# Patient Record
Sex: MALE | Race: WHITE | NOT HISPANIC OR LATINO | Employment: OTHER | URBAN - NONMETROPOLITAN AREA
[De-identification: names, ages, dates, MRNs, and addresses within clinical notes are randomized per-mention and may not be internally consistent; named-entity substitution may affect disease eponyms.]

---

## 2017-01-27 ENCOUNTER — OFFICE VISIT (OUTPATIENT)
Dept: INTERNAL MEDICINE | Facility: CLINIC | Age: 52
End: 2017-01-27

## 2017-01-27 VITALS
BODY MASS INDEX: 28.49 KG/M2 | DIASTOLIC BLOOD PRESSURE: 90 MMHG | HEART RATE: 86 BPM | HEIGHT: 73 IN | WEIGHT: 215 LBS | SYSTOLIC BLOOD PRESSURE: 130 MMHG | OXYGEN SATURATION: 97 %

## 2017-01-27 DIAGNOSIS — M25.511 CHRONIC PAIN OF BOTH SHOULDERS: Primary | ICD-10-CM

## 2017-01-27 DIAGNOSIS — G89.29 CHRONIC PAIN OF BOTH SHOULDERS: Primary | ICD-10-CM

## 2017-01-27 DIAGNOSIS — I10 ESSENTIAL HYPERTENSION: ICD-10-CM

## 2017-01-27 DIAGNOSIS — G89.29 CHRONIC PAIN OF RIGHT KNEE: ICD-10-CM

## 2017-01-27 DIAGNOSIS — M25.512 CHRONIC PAIN OF BOTH SHOULDERS: Primary | ICD-10-CM

## 2017-01-27 DIAGNOSIS — M25.561 CHRONIC PAIN OF RIGHT KNEE: ICD-10-CM

## 2017-01-27 PROCEDURE — 99213 OFFICE O/P EST LOW 20 MIN: CPT | Performed by: FAMILY MEDICINE

## 2017-01-27 RX ORDER — MELOXICAM 15 MG/1
15 TABLET ORAL DAILY
Qty: 90 TABLET | Refills: 3 | Status: SHIPPED | OUTPATIENT
Start: 2017-01-27 | End: 2019-10-10

## 2017-01-27 RX ORDER — MELOXICAM 15 MG/1
15 TABLET ORAL DAILY
Qty: 90 TABLET | Refills: 3 | Status: SHIPPED | OUTPATIENT
Start: 2017-01-27 | End: 2017-01-27 | Stop reason: SDUPTHER

## 2017-01-27 RX ORDER — CYCLOBENZAPRINE HCL 10 MG
10 TABLET ORAL 3 TIMES DAILY PRN
Qty: 90 TABLET | Refills: 2 | Status: SHIPPED | OUTPATIENT
Start: 2017-01-27 | End: 2019-10-10

## 2017-01-27 NOTE — MR AVS SNAPSHOT
Vaughn Huynh   1/27/2017 10:00 AM   Office Visit    Provider:  Darien Ramsey DO   Department:  [facility] PRIMARY CARE   Dept Phone:  [phone]                Your Full Care Plan              Today's Medication Changes          These changes are accurate as of: 1/27/17 10:55 AM.  If you have any questions, ask your nurse or doctor.               New Medication(s)Ordered:     meloxicam 15 MG tablet   Commonly known as:  MOBIC   Take 1 tablet by mouth Daily.   Started by:  Darien Ramsey DO            Where to Get Your Medications      These medications were sent to [facility] - [place], [place] - [address] [place] [address] - [phone]  - [phone] [address] [address]     Phone:  [phone]     cyclobenzaprine 10 MG tablet    meloxicam 15 MG tablet                  Your Updated Medication List          This list is accurate as of: 1/27/17 10:55 AM.  Always use your most recent med list.                aspirin 81 MG tablet       cyclobenzaprine 10 MG tablet   Commonly known as:  FLEXERIL   Take 1 tablet by mouth 3 (Three) Times a Day As Needed for muscle spasms.       fluocinonide-emollient 0.05 % cream   Commonly known as:  LIDEX-E   Apply  topically 2 (Two) Times a Day.       lisinopril-hydrochlorothiazide 20-12.5 MG per tablet   Commonly known as:  PRINZIDE,ZESTORETIC   Take 1 tablet by mouth 2 (two) times a day.       meloxicam 15 MG tablet   Commonly known as:  MOBIC   Take 1 tablet by mouth Daily.       NEXIUM 24HR 20 MG capsule   Generic drug:  esomeprazole               You Were Diagnosed With        Codes Comments    Chronic pain of both shoulders    -  Primary ICD-10-CM: M25.512, G89.29, M25.511  ICD-9-CM: 719.41, 338.29     Essential hypertension     ICD-10-CM: I10  ICD-9-CM: 401.9     Chronic pain of right knee     ICD-10-CM: M25.561, G89.29  ICD-9-CM: 719.46, 338.29       Instructions     None    Patient  "Instructions History      Vettery Signup     Our records indicate that you have an active [facility] Vettery account.    You can view your After Visit Summary by going to [email or web address] and logging in with your Vettery username and password.  If you don't have a Vettery username and password but a parent or guardian has access to your record, the parent or guardian should login with their own Vettery username and password and access your record to view the After Visit Summary.    If you have questions, you can email [email or web address] or call [phone] to talk to our Vettery staff.  Remember, Vettery is NOT to be used for urgent needs.  For medical emergencies, dial 911.               Other Info from Your Visit           Allergies     Statins  Other (See Comments)    Other reaction(s): Rhabdomyolysis      Reason for Visit     Hypertension FASTING TODAY       Vital Signs     Blood Pressure Pulse Height Weight Oxygen Saturation Body Mass Index    130/90 86 73\" (185.4 cm) 215 lb (97.5 kg) 97% 28.37 kg/m2    Smoking Status                   Former Smoker           Problems and Diagnoses Noted     Pain in both shoulders    High blood pressure    Knee pain      "

## 2017-03-15 ENCOUNTER — OFFICE VISIT (OUTPATIENT)
Dept: INTERNAL MEDICINE | Facility: CLINIC | Age: 52
End: 2017-03-15

## 2017-03-15 VITALS
SYSTOLIC BLOOD PRESSURE: 140 MMHG | HEART RATE: 89 BPM | HEIGHT: 73 IN | WEIGHT: 214.25 LBS | OXYGEN SATURATION: 95 % | TEMPERATURE: 98.6 F | BODY MASS INDEX: 28.39 KG/M2 | DIASTOLIC BLOOD PRESSURE: 85 MMHG

## 2017-03-15 DIAGNOSIS — G89.29 CHRONIC PAIN OF BOTH SHOULDERS: Primary | ICD-10-CM

## 2017-03-15 DIAGNOSIS — M25.511 CHRONIC PAIN OF BOTH SHOULDERS: Primary | ICD-10-CM

## 2017-03-15 DIAGNOSIS — E78.00 PURE HYPERCHOLESTEROLEMIA: ICD-10-CM

## 2017-03-15 DIAGNOSIS — E55.9 VITAMIN D DEFICIENCY: ICD-10-CM

## 2017-03-15 DIAGNOSIS — S83.241S ACUTE MENISCAL TEAR, MEDIAL, RIGHT, SEQUELA: ICD-10-CM

## 2017-03-15 DIAGNOSIS — Z79.899 ENCOUNTER FOR LONG-TERM CURRENT USE OF MEDICATION: ICD-10-CM

## 2017-03-15 DIAGNOSIS — R35.1 NOCTURIA: ICD-10-CM

## 2017-03-15 DIAGNOSIS — M25.512 CHRONIC PAIN OF BOTH SHOULDERS: Primary | ICD-10-CM

## 2017-03-15 DIAGNOSIS — I10 ESSENTIAL HYPERTENSION: ICD-10-CM

## 2017-03-15 DIAGNOSIS — R74.8 ABNORMAL LIVER ENZYMES: ICD-10-CM

## 2017-03-15 LAB
25(OH)D3 SERPL-MCNC: 15.4 NG/ML
ALBUMIN SERPL-MCNC: 4.5 G/DL (ref 3.2–4.8)
ALBUMIN/GLOB SERPL: 1.6 G/DL (ref 1.5–2.5)
ALP SERPL-CCNC: 87 U/L (ref 25–100)
ALT SERPL W P-5'-P-CCNC: 58 U/L (ref 7–40)
ANION GAP SERPL CALCULATED.3IONS-SCNC: 2 MMOL/L (ref 3–11)
ARTICHOKE IGE QN: 107 MG/DL (ref 0–130)
AST SERPL-CCNC: 39 U/L (ref 0–33)
BASOPHILS # BLD AUTO: 0.04 10*3/MM3 (ref 0–0.2)
BASOPHILS NFR BLD AUTO: 0.4 % (ref 0–1)
BILIRUB BLD-MCNC: NEGATIVE MG/DL
BILIRUB SERPL-MCNC: 0.7 MG/DL (ref 0.3–1.2)
BUN BLD-MCNC: 15 MG/DL (ref 9–23)
BUN/CREAT SERPL: 18.8 (ref 7–25)
CALCIUM SPEC-SCNC: 10 MG/DL (ref 8.7–10.4)
CHLORIDE SERPL-SCNC: 102 MMOL/L (ref 99–109)
CHOLEST SERPL-MCNC: 238 MG/DL (ref 0–200)
CLARITY, POC: ABNORMAL
CO2 SERPL-SCNC: 37 MMOL/L (ref 20–31)
COLOR UR: ABNORMAL
CREAT BLD-MCNC: 0.8 MG/DL (ref 0.6–1.3)
DEPRECATED RDW RBC AUTO: 42.9 FL (ref 37–54)
EOSINOPHIL # BLD AUTO: 0.21 10*3/MM3 (ref 0.1–0.3)
EOSINOPHIL NFR BLD AUTO: 2.3 % (ref 0–3)
ERYTHROCYTE [DISTWIDTH] IN BLOOD BY AUTOMATED COUNT: 13.5 % (ref 11.3–14.5)
GFR SERPL CREATININE-BSD FRML MDRD: 102 ML/MIN/1.73
GLOBULIN UR ELPH-MCNC: 2.9 GM/DL
GLUCOSE BLD-MCNC: 101 MG/DL (ref 70–100)
GLUCOSE UR STRIP-MCNC: NEGATIVE MG/DL
HCT VFR BLD AUTO: 45.6 % (ref 38.9–50.9)
HDLC SERPL-MCNC: 39 MG/DL (ref 40–60)
HGB BLD-MCNC: 15.2 G/DL (ref 13.1–17.5)
IMM GRANULOCYTES # BLD: 0.04 10*3/MM3 (ref 0–0.03)
IMM GRANULOCYTES NFR BLD: 0.4 % (ref 0–0.6)
KETONES UR QL: NEGATIVE
LEUKOCYTE EST, POC: NEGATIVE
LYMPHOCYTES # BLD AUTO: 1.36 10*3/MM3 (ref 0.6–4.8)
LYMPHOCYTES NFR BLD AUTO: 15 % (ref 24–44)
MCH RBC QN AUTO: 29 PG (ref 27–31)
MCHC RBC AUTO-ENTMCNC: 33.3 G/DL (ref 32–36)
MCV RBC AUTO: 86.9 FL (ref 80–99)
MONOCYTES # BLD AUTO: 0.85 10*3/MM3 (ref 0–1)
MONOCYTES NFR BLD AUTO: 9.4 % (ref 0–12)
NEUTROPHILS # BLD AUTO: 6.56 10*3/MM3 (ref 1.5–8.3)
NEUTROPHILS NFR BLD AUTO: 72.5 % (ref 41–71)
NITRITE UR-MCNC: NEGATIVE MG/ML
PH UR: 6 [PH] (ref 5–8)
PLATELET # BLD AUTO: 289 10*3/MM3 (ref 150–450)
PMV BLD AUTO: 10.1 FL (ref 6–12)
POTASSIUM BLD-SCNC: 4 MMOL/L (ref 3.5–5.5)
PROT SERPL-MCNC: 7.4 G/DL (ref 5.7–8.2)
PROT UR STRIP-MCNC: ABNORMAL MG/DL
PSA SERPL-MCNC: 1 NG/ML (ref 0–4)
RBC # BLD AUTO: 5.25 10*6/MM3 (ref 4.2–5.76)
RBC # UR STRIP: ABNORMAL /UL
SODIUM BLD-SCNC: 141 MMOL/L (ref 132–146)
SP GR UR: 1.01 (ref 1–1.03)
T4 FREE SERPL-MCNC: 1.11 NG/DL (ref 0.89–1.76)
TRIGL SERPL-MCNC: 515 MG/DL (ref 0–150)
TSH SERPL DL<=0.05 MIU/L-ACNC: 0.9 MIU/ML (ref 0.35–5.35)
UROBILINOGEN UR QL: ABNORMAL
WBC NRBC COR # BLD: 9.06 10*3/MM3 (ref 3.5–10.8)

## 2017-03-15 PROCEDURE — 84439 ASSAY OF FREE THYROXINE: CPT | Performed by: FAMILY MEDICINE

## 2017-03-15 PROCEDURE — 36415 COLL VENOUS BLD VENIPUNCTURE: CPT | Performed by: FAMILY MEDICINE

## 2017-03-15 PROCEDURE — 84443 ASSAY THYROID STIM HORMONE: CPT | Performed by: FAMILY MEDICINE

## 2017-03-15 PROCEDURE — 85025 COMPLETE CBC W/AUTO DIFF WBC: CPT | Performed by: FAMILY MEDICINE

## 2017-03-15 PROCEDURE — 80053 COMPREHEN METABOLIC PANEL: CPT | Performed by: FAMILY MEDICINE

## 2017-03-15 PROCEDURE — 82306 VITAMIN D 25 HYDROXY: CPT | Performed by: FAMILY MEDICINE

## 2017-03-15 PROCEDURE — 84153 ASSAY OF PSA TOTAL: CPT | Performed by: FAMILY MEDICINE

## 2017-03-15 PROCEDURE — 99213 OFFICE O/P EST LOW 20 MIN: CPT | Performed by: FAMILY MEDICINE

## 2017-03-15 PROCEDURE — 80061 LIPID PANEL: CPT | Performed by: FAMILY MEDICINE

## 2017-03-15 PROCEDURE — 81003 URINALYSIS AUTO W/O SCOPE: CPT | Performed by: FAMILY MEDICINE
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
## 2017-07-27 DIAGNOSIS — I10 ESSENTIAL HYPERTENSION: ICD-10-CM

## 2017-07-27 RX ORDER — LISINOPRIL AND HYDROCHLOROTHIAZIDE 20; 12.5 MG/1; MG/1
TABLET ORAL
Qty: 180 TABLET | Refills: 2 | Status: SHIPPED | OUTPATIENT
Start: 2017-07-27 | End: 2020-03-05 | Stop reason: SDUPTHER

## 2017-11-07 ENCOUNTER — OFFICE VISIT (OUTPATIENT)
Dept: INTERNAL MEDICINE | Facility: CLINIC | Age: 52
End: 2017-11-07

## 2017-11-07 VITALS
WEIGHT: 213 LBS | BODY MASS INDEX: 28.23 KG/M2 | OXYGEN SATURATION: 98 % | SYSTOLIC BLOOD PRESSURE: 130 MMHG | DIASTOLIC BLOOD PRESSURE: 90 MMHG | HEIGHT: 73 IN | HEART RATE: 79 BPM

## 2017-11-07 DIAGNOSIS — M62.08 RECTUS DIASTASIS: ICD-10-CM

## 2017-11-07 DIAGNOSIS — S83.206D ACUTE MENISCAL TEAR OF RIGHT KNEE, SUBSEQUENT ENCOUNTER: Primary | ICD-10-CM

## 2017-11-07 DIAGNOSIS — L30.8 OTHER ECZEMA: ICD-10-CM

## 2017-11-07 PROBLEM — S83.206A ACUTE MENISCAL TEAR OF RIGHT KNEE: Status: ACTIVE | Noted: 2017-11-07

## 2017-11-07 PROCEDURE — 99213 OFFICE O/P EST LOW 20 MIN: CPT | Performed by: FAMILY MEDICINE

## 2017-11-07 NOTE — PROGRESS NOTES
Subjective   Vaughn Huynh is a 52 y.o. male.     History of Present Illness   VAUGHN has been seeing Dr. Thakkar for some time this year for his right knee.  He was told that Vaughn needed to have a knee replacement  Before long. They are trying to procrastinate due to age.   He needs a referral from where he just started working with [employer].    He has a sore area in his ventral epigastric region that he wants evaluated.  It's sore, and he hurt it this past week carrrying heavy object.        The following portions of the patient's history were reviewed and updated as appropriate: allergies, current medications, past social history and problem list.    Review of Systems   Constitutional: Negative for appetite change, chills, fatigue, fever and unexpected weight change.   HENT: Negative for congestion, ear pain, hearing loss, nosebleeds, postnasal drip, rhinorrhea, sore throat, tinnitus and trouble swallowing.    Eyes: Negative for photophobia, discharge and visual disturbance.   Respiratory: Negative for cough, chest tightness, shortness of breath and wheezing.    Cardiovascular: Negative for chest pain, palpitations and leg swelling.   Gastrointestinal: Positive for abdominal distention and abdominal pain. Negative for blood in stool, constipation, diarrhea, nausea and vomiting.   Endocrine: Negative for cold intolerance, heat intolerance, polydipsia, polyphagia and polyuria.   Musculoskeletal: Positive for arthralgias and myalgias. Negative for back pain, joint swelling, neck pain and neck stiffness.   Skin: Negative for color change, pallor, rash and wound.   Allergic/Immunologic: Negative for environmental allergies, food allergies and immunocompromised state.   Neurological: Negative for dizziness, tremors, seizures, weakness, numbness and headaches.   Hematological: Negative for adenopathy. Does not bruise/bleed easily.   Psychiatric/Behavioral: Negative for agitation, behavioral problems, confusion,  "hallucinations, self-injury and suicidal ideas. The patient is not nervous/anxious.        Objective   /90  Pulse 79  Ht 73\" (185.4 cm)  Wt 213 lb (96.6 kg)  SpO2 98%  BMI 28.1 kg/m2  Physical Exam   Constitutional: He is oriented to person, place, and time. He appears well-developed and well-nourished. No distress.   HENT:   Head: Normocephalic and atraumatic.   Right Ear: External ear normal.   Left Ear: External ear normal.   Nose: Nose normal.   Mouth/Throat: Oropharynx is clear and moist.   Eyes: Conjunctivae and EOM are normal. Pupils are equal, round, and reactive to light. Right eye exhibits no discharge. Left eye exhibits no discharge. No scleral icterus.   Neck: Normal range of motion. Neck supple. No JVD present. No tracheal deviation present. No thyromegaly present.   Cardiovascular: Normal rate, regular rhythm, normal heart sounds and intact distal pulses.  Exam reveals no gallop and no friction rub.    No murmur heard.  Pulmonary/Chest: Effort normal and breath sounds normal. No stridor. No respiratory distress. He has no wheezes. He has no rales. He exhibits no tenderness.   Abdominal: Soft. Bowel sounds are normal. He exhibits no distension and no mass. There is no tenderness. There is no rebound and no guarding. No hernia.   Rectus diastasis     Musculoskeletal: Normal range of motion. He exhibits no edema, tenderness or deformity.   Lymphadenopathy:     He has no cervical adenopathy.   Neurological: He is alert and oriented to person, place, and time. He has normal reflexes. He displays normal reflexes. No cranial nerve deficit. He exhibits normal muscle tone.   Skin: Skin is warm and dry. No rash noted. No erythema. No pallor.   Psychiatric: He has a normal mood and affect. His behavior is normal. Judgment normal.       Assessment/Plan   Problem List Items Addressed This Visit        Musculoskeletal and Integument    Eczema    Relevant Medications    fluocinonide-emollient (LIDEX-E) " 0.05 % cream    Acute meniscal tear of right knee - Primary    Relevant Orders    Ambulatory Referral to Orthopedic Surgery    Rectus diastasis  Work on oblique abdominal muscles

## 2017-12-04 ENCOUNTER — OFFICE VISIT (OUTPATIENT)
Dept: ORTHOPEDIC SURGERY | Facility: CLINIC | Age: 52
End: 2017-12-04

## 2017-12-04 VITALS
WEIGHT: 212.96 LBS | DIASTOLIC BLOOD PRESSURE: 88 MMHG | HEIGHT: 73 IN | HEART RATE: 80 BPM | SYSTOLIC BLOOD PRESSURE: 136 MMHG | BODY MASS INDEX: 28.22 KG/M2

## 2017-12-04 DIAGNOSIS — M17.11 OSTEOARTHRITIS OF RIGHT KNEE, UNSPECIFIED OSTEOARTHRITIS TYPE: Primary | ICD-10-CM

## 2017-12-04 PROCEDURE — 20610 DRAIN/INJ JOINT/BURSA W/O US: CPT | Performed by: ORTHOPAEDIC SURGERY

## 2017-12-04 PROCEDURE — 99214 OFFICE O/P EST MOD 30 MIN: CPT | Performed by: ORTHOPAEDIC SURGERY

## 2017-12-04 RX ORDER — LIDOCAINE HYDROCHLORIDE 10 MG/ML
4 INJECTION, SOLUTION INFILTRATION; PERINEURAL
Status: COMPLETED | OUTPATIENT
Start: 2017-12-04 | End: 2017-12-04

## 2017-12-04 RX ORDER — TRIAMCINOLONE ACETONIDE 40 MG/ML
40 INJECTION, SUSPENSION INTRA-ARTICULAR; INTRAMUSCULAR
Status: COMPLETED | OUTPATIENT
Start: 2017-12-04 | End: 2017-12-04

## 2017-12-04 RX ADMIN — LIDOCAINE HYDROCHLORIDE 4 ML: 10 INJECTION, SOLUTION INFILTRATION; PERINEURAL at 10:42

## 2017-12-04 RX ADMIN — TRIAMCINOLONE ACETONIDE 40 MG: 40 INJECTION, SUSPENSION INTRA-ARTICULAR; INTRAMUSCULAR at 10:42

## 2017-12-04 NOTE — PROGRESS NOTES
Procedure   Large Joint Arthrocentesis  Date/Time: 12/4/2017 10:42 AM  Consent given by: patient  Site marked: site marked  Timeout: Immediately prior to procedure a time out was called to verify the correct patient, procedure, equipment, support staff and site/side marked as required   Supporting Documentation  Indications: pain   Procedure Details  Location: knee - R knee  Preparation: Patient was prepped and draped in the usual sterile fashion  Needle size: 22 G  Approach: anterolateral  Medications administered: 4 mL lidocaine 1 %; 40 mg triamcinolone acetonide 40 MG/ML  Patient tolerance: patient tolerated the procedure well with no immediate complications

## 2017-12-04 NOTE — PROGRESS NOTES
[facility] Orthopaedic Surgery Clinic Note    Subjective     Chief Complaint   Patient presents with   • Right Knee - Pain        HPI    Vaughn Huynh is a 52 y.o. male. He presents today for evaluation of right knee pain.  The pain is mild to moderate in severity, present for several years, following no particular injury.  The pain is associated with swelling, popping, grinding and stiffness.  The pain is sharp in quality.  It is with sitting for long periods of time.  He has responded to injections in the past.      Patient Active Problem List   Diagnosis   • Abnormal liver enzymes   • Excessive sweating   • Ganglion   • Heartburn   • Hyperlipidemia   • Hypertension   • Acute benign hemorrhagic glomerulonephritic syndrome   • Influenza due to influenza A virus   • Knee pain   • Myopathy   • Obstructive sleep apnea syndrome   • Osteoarthritis   • Postnasal drip   • Impaired glucose tolerance   • Nerve root disorder   • Shortness of breath   • Sore throat   • Parageusia   • Urinary symptom or sign   • Vitamin D deficiency   • Hematuria   • Elevated liver function tests   • Colon cancer screening   • Bilateral shoulder pain   • Eczema   • Need for vaccination   • Encounter for long-term current use of medication   • Nocturia   • Acute meniscal tear of right knee   • Rectus diastasis     Past Medical History:   Diagnosis Date   • Adult BMI 27.0-27.9 kg/sq m    • Esophagitis    • Hyperlipidemia 7/18/2016   • Hypertension 7/18/2016   • Knee pain     right   • Myopathy 7/18/2016   • Osteoarthritis 07/18/2016    right knee      Past Surgical History:   Procedure Laterality Date   • BACK SURGERY  2012      Family History   Problem Relation Age of Onset   • Cancer Mother      kindney   • Dementia Father    • Hypertension Father    • Diabetes Father    • Seizures Other    • Cancer Other    • Diabetes Other    • Hypertension Other    • Arthritis Other    • Hypertension Other    • Colon cancer Neg Hx    • Liver cancer Neg Hx     • Liver disease Neg Hx    • Esophageal cancer Neg Hx    • Stomach cancer Neg Hx      Social History     Social History   • Marital status:      Spouse name: N/A   • Number of children: N/A   • Years of education: N/A     Occupational History   • Not on file.     Social History Main Topics   • Smoking status: Former Smoker     Packs/day: 1.00     Types: Cigarettes     Start date: 1/8/1983     Quit date: 1/8/2006   • Smokeless tobacco: Never Used      Comment: quit x 10 years   • Alcohol use Yes      Comment: VERY RARE    • Drug use: No   • Sexual activity: Defer     Other Topics Concern   • Not on file     Social History Narrative      Current Outpatient Prescriptions on File Prior to Visit   Medication Sig Dispense Refill   • aspirin 81 MG tablet Take  by mouth daily.     • atorvastatin (LIPITOR) 20 MG tablet Take  by mouth Take As Directed.     • cyclobenzaprine (FLEXERIL) 10 MG tablet Take 1 tablet by mouth 3 (Three) Times a Day As Needed for muscle spasms. 90 tablet 2   • esomeprazole (NEXIUM 24HR) 20 MG capsule Take 20 mg by mouth every other day.     • fluocinonide-emollient (LIDEX-E) 0.05 % cream Apply  topically 2 (Two) Times a Day. 60 g 11   • lisinopril-hydrochlorothiazide (PRINZIDE,ZESTORETIC) 20-12.5 MG per tablet TAKE 1 TABLET TWICE A  tablet 2   • meloxicam (MOBIC) 15 MG tablet Take 1 tablet by mouth Daily. 90 tablet 3     No current facility-administered medications on file prior to visit.       Allergies   Allergen Reactions   • Statins Other (See Comments)     Other reaction(s): Rhabdomyolysis        Review of Systems   Constitutional: Negative for activity change, appetite change, chills, diaphoresis, fatigue, fever and unexpected weight change.   HENT: Positive for hearing loss. Negative for congestion, dental problem, drooling, ear discharge, ear pain, facial swelling, mouth sores, nosebleeds, postnasal drip, rhinorrhea, sinus pressure, sneezing, sore throat, tinnitus, trouble  "swallowing and voice change.    Eyes: Negative for photophobia, pain, discharge, redness, itching and visual disturbance.   Respiratory: Negative for apnea, cough, choking, chest tightness, shortness of breath, wheezing and stridor.    Cardiovascular: Negative for chest pain, palpitations and leg swelling.   Gastrointestinal: Positive for abdominal pain. Negative for abdominal distention, anal bleeding, blood in stool, constipation, diarrhea, nausea, rectal pain and vomiting.   Endocrine: Negative for cold intolerance, heat intolerance, polydipsia, polyphagia and polyuria.   Genitourinary: Negative for decreased urine volume, difficulty urinating, dysuria, enuresis, flank pain, frequency, genital sores, hematuria and urgency.   Musculoskeletal: Negative for arthralgias, back pain, gait problem, joint swelling, myalgias, neck pain and neck stiffness.        Joint Pain    Skin: Negative for color change, pallor, rash and wound.   Allergic/Immunologic: Negative for environmental allergies, food allergies and immunocompromised state.   Neurological: Negative for dizziness, tremors, seizures, syncope, facial asymmetry, speech difficulty, weakness, light-headedness, numbness and headaches.   Hematological: Negative for adenopathy. Does not bruise/bleed easily.   Psychiatric/Behavioral: Negative for agitation, behavioral problems, confusion, decreased concentration, dysphoric mood, hallucinations, self-injury, sleep disturbance and suicidal ideas. The patient is not nervous/anxious and is not hyperactive.         Objective      Physical Exam  /88  Pulse 80  Ht 72.99\" (185.4 cm)  Wt 212 lb 15.4 oz (96.6 kg)  BMI 28.1 kg/m2    Body mass index is 28.1 kg/(m^2).    General:   Mental Status:  Alert   Appearance: Cooperative, in no acute distress   Build and Nutrition: Well-nourished and well developed male   Orientation: Alert and oriented to person, place and time   Posture: Normal   Gait: " Normal    Integument:   Right knee: No skin lesions, no rash, no ecchymosis    Lower Extremities:   Right Knee:    Tenderness:  No medial or lateral joint line tenderness    Effusion:  None    Swelling: None    Crepitus:  Positive    Range of motion:  Extension: 0°       Flexion: 120°  Instability:  No varus laxity, no valgus laxity, negative anterior drawer  Deformities:  Varus      Imaging/Studies  Imaging Results (last 24 hours)     Procedure Component Value Units Date/Time    XR Knee 4+ View Right [[id number]] Resulted:  12/04/17 1033     Updated:  12/04/17 1033    Narrative:       RIght Knee Radiographs  Indication: right knee pain  Views: Standing AP's and skiers of both knees, with lateral and sunrise   views of the right knee    Comparison: no prior studies available    Findings:   Advanced arthritic changes are seen, with bone-on-bone contact in medial   compartment, with tricompartmental osteophytes.            Assessment and Plan     Vaughn was seen today for pain.    Diagnoses and all orders for this visit:    Osteoarthritis of right knee, unspecified osteoarthritis type  -     XR Knee 4+ View Right  -     Large Joint Arthrocentesis        I reviewed my findings with patient today.  He does arthritis in the right knee, and would like to continue with conservative treatment.  Ultimately he is a candidate for knee replacement surgery in the future.  He may also be a candidate for Visco supplementation injections.  He would prefer a steroid injection today.  I will see him back in 4 months, but sooner for any problems.    Of note, he had 100% relief just a few minutes following the injection.    Return in about 4 months (around 4/4/2018).      Medical Decision Making  Management Options : prescription/IM medicine  Data/Risk: radiology tests and independent visualization of imaging, lab tests, or EMG/NCV      Sen Thakkar MD  12/04/17  10:53 AM

## 2019-10-10 ENCOUNTER — APPOINTMENT (OUTPATIENT)
Dept: CT IMAGING | Facility: HOSPITAL | Age: 54
End: 2019-10-10

## 2019-10-10 ENCOUNTER — HOSPITAL ENCOUNTER (INPATIENT)
Facility: HOSPITAL | Age: 54
LOS: 5 days | Discharge: HOME HEALTH | End: 2019-10-15
Attending: EMERGENCY MEDICINE | Admitting: INTERNAL MEDICINE

## 2019-10-10 ENCOUNTER — APPOINTMENT (OUTPATIENT)
Dept: GENERAL RADIOLOGY | Facility: HOSPITAL | Age: 54
End: 2019-10-10

## 2019-10-10 DIAGNOSIS — I63.9 CEREBROVASCULAR ACCIDENT (CVA), UNSPECIFIED MECHANISM (HCC): Primary | ICD-10-CM

## 2019-10-10 PROBLEM — R09.89 SUSPECTED CEREBROVASCULAR ACCIDENT (CVA): Status: ACTIVE | Noted: 2019-10-10

## 2019-10-10 LAB
ALT SERPL W P-5'-P-CCNC: 33 U/L (ref 1–41)
APTT PPP: 27.5 SECONDS (ref 24–37)
AST SERPL-CCNC: 21 U/L (ref 1–40)
BASE EXCESS BLDA CALC-SCNC: 2 MMOL/L (ref -5–5)
BASOPHILS # BLD AUTO: 0.07 10*3/MM3 (ref 0–0.2)
BASOPHILS NFR BLD AUTO: 0.9 % (ref 0–1.5)
CA-I BLDA-SCNC: 1.23 MMOL/L (ref 1.2–1.32)
CO2 BLDA-SCNC: 28 MMOL/L (ref 24–29)
DEPRECATED RDW RBC AUTO: 39.5 FL (ref 37–54)
EOSINOPHIL # BLD AUTO: 0.18 10*3/MM3 (ref 0–0.4)
EOSINOPHIL NFR BLD AUTO: 2.4 % (ref 0.3–6.2)
ERYTHROCYTE [DISTWIDTH] IN BLOOD BY AUTOMATED COUNT: 12.6 % (ref 12.3–15.4)
GLUCOSE BLDC GLUCOMTR-MCNC: 136 MG/DL (ref 70–130)
HCO3 BLDA-SCNC: 26.9 MMOL/L (ref 22–26)
HCT VFR BLD AUTO: 43.6 % (ref 37.5–51)
HCT VFR BLDA CALC: 42 % (ref 38–51)
HGB BLD-MCNC: 14.4 G/DL (ref 13–17.7)
HGB BLDA-MCNC: 14.3 G/DL (ref 12–17)
HOLD SPECIMEN: NORMAL
HOLD SPECIMEN: NORMAL
IMM GRANULOCYTES # BLD AUTO: 0.03 10*3/MM3 (ref 0–0.05)
IMM GRANULOCYTES NFR BLD AUTO: 0.4 % (ref 0–0.5)
LYMPHOCYTES # BLD AUTO: 2 10*3/MM3 (ref 0.7–3.1)
LYMPHOCYTES NFR BLD AUTO: 26.2 % (ref 19.6–45.3)
MCH RBC QN AUTO: 28.3 PG (ref 26.6–33)
MCHC RBC AUTO-ENTMCNC: 33 G/DL (ref 31.5–35.7)
MCV RBC AUTO: 85.8 FL (ref 79–97)
MONOCYTES # BLD AUTO: 0.65 10*3/MM3 (ref 0.1–0.9)
MONOCYTES NFR BLD AUTO: 8.5 % (ref 5–12)
NEUTROPHILS # BLD AUTO: 4.69 10*3/MM3 (ref 1.7–7)
NEUTROPHILS NFR BLD AUTO: 61.6 % (ref 42.7–76)
NRBC BLD AUTO-RTO: 0 /100 WBC (ref 0–0.2)
PCO2 BLDA: 46.5 MM HG (ref 35–45)
PH BLDA: 7.37 PH UNITS (ref 7.35–7.6)
PLATELET # BLD AUTO: 270 10*3/MM3 (ref 140–450)
PMV BLD AUTO: 9.8 FL (ref 6–12)
PO2 BLDA: 43 MMHG (ref 80–105)
POTASSIUM BLDA-SCNC: 3.7 MMOL/L (ref 3.5–4.9)
RBC # BLD AUTO: 5.08 10*6/MM3 (ref 4.14–5.8)
SAO2 % BLDA: 77 % (ref 95–98)
SODIUM BLDA-SCNC: 135 MMOL/L (ref 138–146)
TROPONIN T SERPL-MCNC: <0.01 NG/ML (ref 0–0.03)
WBC NRBC COR # BLD: 7.62 10*3/MM3 (ref 3.4–10.8)
WHOLE BLOOD HOLD SPECIMEN: NORMAL
WHOLE BLOOD HOLD SPECIMEN: NORMAL

## 2019-10-10 PROCEDURE — 85025 COMPLETE CBC W/AUTO DIFF WBC: CPT | Performed by: EMERGENCY MEDICINE

## 2019-10-10 PROCEDURE — 99291 CRITICAL CARE FIRST HOUR: CPT | Performed by: INTERNAL MEDICINE

## 2019-10-10 PROCEDURE — 25010000002 ALTEPLASE PER 1 MG: Performed by: EMERGENCY MEDICINE

## 2019-10-10 PROCEDURE — 85014 HEMATOCRIT: CPT

## 2019-10-10 PROCEDURE — 84460 ALANINE AMINO (ALT) (SGPT): CPT | Performed by: EMERGENCY MEDICINE

## 2019-10-10 PROCEDURE — 82330 ASSAY OF CALCIUM: CPT

## 2019-10-10 PROCEDURE — 82803 BLOOD GASES ANY COMBINATION: CPT

## 2019-10-10 PROCEDURE — 70498 CT ANGIOGRAPHY NECK: CPT

## 2019-10-10 PROCEDURE — 70450 CT HEAD/BRAIN W/O DYE: CPT

## 2019-10-10 PROCEDURE — 3E03317 INTRODUCTION OF OTHER THROMBOLYTIC INTO PERIPHERAL VEIN, PERCUTANEOUS APPROACH: ICD-10-PCS | Performed by: EMERGENCY MEDICINE

## 2019-10-10 PROCEDURE — 0 IOPAMIDOL PER 1 ML: Performed by: EMERGENCY MEDICINE

## 2019-10-10 PROCEDURE — 84450 TRANSFERASE (AST) (SGOT): CPT | Performed by: EMERGENCY MEDICINE

## 2019-10-10 PROCEDURE — 84484 ASSAY OF TROPONIN QUANT: CPT | Performed by: EMERGENCY MEDICINE

## 2019-10-10 PROCEDURE — 0042T HC CT CEREBRAL PERFUSION W/WO CONTRAST: CPT

## 2019-10-10 PROCEDURE — 84295 ASSAY OF SERUM SODIUM: CPT

## 2019-10-10 PROCEDURE — 85730 THROMBOPLASTIN TIME PARTIAL: CPT | Performed by: EMERGENCY MEDICINE

## 2019-10-10 PROCEDURE — 70496 CT ANGIOGRAPHY HEAD: CPT

## 2019-10-10 PROCEDURE — 84132 ASSAY OF SERUM POTASSIUM: CPT

## 2019-10-10 PROCEDURE — 63710000001 INSULIN LISPRO (HUMAN) PER 5 UNITS: Performed by: NURSE PRACTITIONER

## 2019-10-10 PROCEDURE — 99285 EMERGENCY DEPT VISIT HI MDM: CPT

## 2019-10-10 PROCEDURE — 71045 X-RAY EXAM CHEST 1 VIEW: CPT

## 2019-10-10 PROCEDURE — 82947 ASSAY GLUCOSE BLOOD QUANT: CPT

## 2019-10-10 PROCEDURE — 93005 ELECTROCARDIOGRAM TRACING: CPT | Performed by: EMERGENCY MEDICINE

## 2019-10-10 RX ORDER — SODIUM CHLORIDE 0.9 % (FLUSH) 0.9 %
10 SYRINGE (ML) INJECTION EVERY 12 HOURS SCHEDULED
Status: DISCONTINUED | OUTPATIENT
Start: 2019-10-10 | End: 2019-10-11

## 2019-10-10 RX ORDER — DEXTROSE MONOHYDRATE 25 G/50ML
25 INJECTION, SOLUTION INTRAVENOUS
Status: DISCONTINUED | OUTPATIENT
Start: 2019-10-10 | End: 2019-10-12

## 2019-10-10 RX ORDER — SODIUM CHLORIDE 0.9 % (FLUSH) 0.9 %
10 SYRINGE (ML) INJECTION AS NEEDED
Status: DISCONTINUED | OUTPATIENT
Start: 2019-10-10 | End: 2019-10-15 | Stop reason: HOSPADM

## 2019-10-10 RX ORDER — SODIUM CHLORIDE 0.9 % (FLUSH) 0.9 %
10 SYRINGE (ML) INJECTION EVERY 12 HOURS SCHEDULED
Status: DISCONTINUED | OUTPATIENT
Start: 2019-10-10 | End: 2019-10-15 | Stop reason: HOSPADM

## 2019-10-10 RX ORDER — ESOMEPRAZOLE MAGNESIUM 40 MG/1
40 CAPSULE, DELAYED RELEASE ORAL
COMMUNITY
End: 2022-07-14

## 2019-10-10 RX ORDER — SODIUM CHLORIDE 0.9 % (FLUSH) 0.9 %
10 SYRINGE (ML) INJECTION AS NEEDED
Status: DISCONTINUED | OUTPATIENT
Start: 2019-10-10 | End: 2019-10-11

## 2019-10-10 RX ORDER — SODIUM CHLORIDE 0.9 % (FLUSH) 0.9 %
10 SYRINGE (ML) INJECTION AS NEEDED
Status: DISCONTINUED | OUTPATIENT
Start: 2019-10-10 | End: 2019-10-12

## 2019-10-10 RX ORDER — ASPIRIN 300 MG/1
300 SUPPOSITORY RECTAL DAILY
Status: DISCONTINUED | OUTPATIENT
Start: 2019-10-11 | End: 2019-10-12

## 2019-10-10 RX ORDER — SODIUM CHLORIDE 9 MG/ML
100 INJECTION, SOLUTION INTRAVENOUS ONCE
Status: DISCONTINUED | OUTPATIENT
Start: 2019-10-10 | End: 2019-10-12

## 2019-10-10 RX ORDER — ASPIRIN 325 MG
325 TABLET ORAL DAILY
Status: DISCONTINUED | OUTPATIENT
Start: 2019-10-11 | End: 2019-10-12

## 2019-10-10 RX ORDER — NICOTINE POLACRILEX 4 MG
15 LOZENGE BUCCAL
Status: DISCONTINUED | OUTPATIENT
Start: 2019-10-10 | End: 2019-10-12

## 2019-10-10 RX ADMIN — ALTEPLASE 79.87 MG: KIT at 22:28

## 2019-10-10 RX ADMIN — ALTEPLASE 8.87 MG: KIT at 22:27

## 2019-10-10 RX ADMIN — IOPAMIDOL 115 ML: 755 INJECTION, SOLUTION INTRAVENOUS at 22:04

## 2019-10-11 ENCOUNTER — APPOINTMENT (OUTPATIENT)
Dept: CARDIOLOGY | Facility: HOSPITAL | Age: 54
End: 2019-10-11

## 2019-10-11 ENCOUNTER — APPOINTMENT (OUTPATIENT)
Dept: MRI IMAGING | Facility: HOSPITAL | Age: 54
End: 2019-10-11

## 2019-10-11 ENCOUNTER — APPOINTMENT (OUTPATIENT)
Dept: CT IMAGING | Facility: HOSPITAL | Age: 54
End: 2019-10-11

## 2019-10-11 PROBLEM — Q21.12 PFO (PATENT FORAMEN OVALE): Status: ACTIVE | Noted: 2019-10-11

## 2019-10-11 PROBLEM — I63.9 CEREBROVASCULAR ACCIDENT (CVA): Status: ACTIVE | Noted: 2019-10-11

## 2019-10-11 LAB
ALBUMIN SERPL-MCNC: 3.9 G/DL (ref 3.5–5.2)
ALBUMIN/GLOB SERPL: 1.4 G/DL
ALP SERPL-CCNC: 67 U/L (ref 39–117)
ALT SERPL W P-5'-P-CCNC: 31 U/L (ref 1–41)
ANION GAP SERPL CALCULATED.3IONS-SCNC: 8 MMOL/L (ref 5–15)
ARTICHOKE IGE QN: 77 MG/DL (ref 0–100)
ASCENDING AORTA: 3.3 CM
AST SERPL-CCNC: 18 U/L (ref 1–40)
BASOPHILS # BLD AUTO: 0.05 10*3/MM3 (ref 0–0.2)
BASOPHILS NFR BLD AUTO: 0.8 % (ref 0–1.5)
BH CV ECHO MEAS - AO MAX PG (FULL): 3.4 MMHG
BH CV ECHO MEAS - AO MAX PG: 5.8 MMHG
BH CV ECHO MEAS - AO MEAN PG (FULL): 2 MMHG
BH CV ECHO MEAS - AO MEAN PG: 3 MMHG
BH CV ECHO MEAS - AO ROOT AREA (BSA CORRECTED): 1.6
BH CV ECHO MEAS - AO ROOT AREA: 9.6 CM^2
BH CV ECHO MEAS - AO ROOT DIAM: 3.5 CM
BH CV ECHO MEAS - AO V2 MAX: 120 CM/SEC
BH CV ECHO MEAS - AO V2 MEAN: 79.3 CM/SEC
BH CV ECHO MEAS - AO V2 VTI: 22.8 CM
BH CV ECHO MEAS - ASC AORTA: 3.3 CM
BH CV ECHO MEAS - AVA(I,A): 2.5 CM^2
BH CV ECHO MEAS - AVA(I,D): 2.5 CM^2
BH CV ECHO MEAS - AVA(V,A): 2.4 CM^2
BH CV ECHO MEAS - AVA(V,D): 2.4 CM^2
BH CV ECHO MEAS - BSA(HAYCOCK): 2.2 M^2
BH CV ECHO MEAS - BSA: 2.2 M^2
BH CV ECHO MEAS - BZI_BMI: 27.1 KILOGRAMS/M^2
BH CV ECHO MEAS - BZI_METRIC_HEIGHT: 188 CM
BH CV ECHO MEAS - BZI_METRIC_WEIGHT: 95.7 KG
BH CV ECHO MEAS - EDV(CUBED): 108.5 ML
BH CV ECHO MEAS - EDV(MOD-SP2): 61 ML
BH CV ECHO MEAS - EDV(MOD-SP4): 62 ML
BH CV ECHO MEAS - EDV(TEICH): 106 ML
BH CV ECHO MEAS - EF(CUBED): 76.1 %
BH CV ECHO MEAS - EF(MOD-BP): 52 %
BH CV ECHO MEAS - EF(MOD-SP2): 50.8 %
BH CV ECHO MEAS - EF(MOD-SP4): 53.2 %
BH CV ECHO MEAS - EF(TEICH): 68 %
BH CV ECHO MEAS - ESV(CUBED): 25.9 ML
BH CV ECHO MEAS - ESV(MOD-SP2): 30 ML
BH CV ECHO MEAS - ESV(MOD-SP4): 29 ML
BH CV ECHO MEAS - ESV(TEICH): 33.9 ML
BH CV ECHO MEAS - FS: 37.9 %
BH CV ECHO MEAS - IVS/LVPW: 1.1
BH CV ECHO MEAS - IVSD: 1.1 CM
BH CV ECHO MEAS - LA DIMENSION: 3.5 CM
BH CV ECHO MEAS - LA/AO: 1
BH CV ECHO MEAS - LAD MAJOR: 5.2 CM
BH CV ECHO MEAS - LAT PEAK E' VEL: 9.6 CM/SEC
BH CV ECHO MEAS - LATERAL E/E' RATIO: 6.3
BH CV ECHO MEAS - LV DIASTOLIC VOL/BSA (35-75): 27.9 ML/M^2
BH CV ECHO MEAS - LV MASS(C)D: 174.2 GRAMS
BH CV ECHO MEAS - LV MASS(C)DI: 78.4 GRAMS/M^2
BH CV ECHO MEAS - LV MAX PG: 2.4 MMHG
BH CV ECHO MEAS - LV MEAN PG: 1 MMHG
BH CV ECHO MEAS - LV SYSTOLIC VOL/BSA (12-30): 13 ML/M^2
BH CV ECHO MEAS - LV V1 MAX: 77.1 CM/SEC
BH CV ECHO MEAS - LV V1 MEAN: 50.6 CM/SEC
BH CV ECHO MEAS - LV V1 VTI: 15.1 CM
BH CV ECHO MEAS - LVIDD: 4.8 CM
BH CV ECHO MEAS - LVIDS: 3 CM
BH CV ECHO MEAS - LVLD AP2: 7.9 CM
BH CV ECHO MEAS - LVLD AP4: 7.6 CM
BH CV ECHO MEAS - LVLS AP2: 6.5 CM
BH CV ECHO MEAS - LVLS AP4: 6.6 CM
BH CV ECHO MEAS - LVOT AREA (M): 3.8 CM^2
BH CV ECHO MEAS - LVOT AREA: 3.8 CM^2
BH CV ECHO MEAS - LVOT DIAM: 2.2 CM
BH CV ECHO MEAS - LVPWD: 1 CM
BH CV ECHO MEAS - MED PEAK E' VEL: 6.4 CM/SEC
BH CV ECHO MEAS - MEDIAL E/E' RATIO: 9.5
BH CV ECHO MEAS - MV A MAX VEL: 67.9 CM/SEC
BH CV ECHO MEAS - MV DEC TIME: 0.25 SEC
BH CV ECHO MEAS - MV E MAX VEL: 60.5 CM/SEC
BH CV ECHO MEAS - MV E/A: 0.89
BH CV ECHO MEAS - MV MAX PG: 2.4 MMHG
BH CV ECHO MEAS - MV MEAN PG: 1 MMHG
BH CV ECHO MEAS - MV V2 MAX: 77.1 CM/SEC
BH CV ECHO MEAS - MV V2 MEAN: 46.9 CM/SEC
BH CV ECHO MEAS - MV V2 VTI: 17.9 CM
BH CV ECHO MEAS - MVA(VTI): 3.2 CM^2
BH CV ECHO MEAS - PA ACC SLOPE: 318 CM/SEC^2
BH CV ECHO MEAS - PA ACC TIME: 0.2 SEC
BH CV ECHO MEAS - PA MAX PG: 4.9 MMHG
BH CV ECHO MEAS - PA PR(ACCEL): -12.8 MMHG
BH CV ECHO MEAS - PA V2 MAX: 111 CM/SEC
BH CV ECHO MEAS - RAP SYSTOLE: 8 MMHG
BH CV ECHO MEAS - SI(AO): 98.7 ML/M^2
BH CV ECHO MEAS - SI(CUBED): 37.2 ML/M^2
BH CV ECHO MEAS - SI(LVOT): 25.8 ML/M^2
BH CV ECHO MEAS - SI(MOD-SP2): 13.9 ML/M^2
BH CV ECHO MEAS - SI(MOD-SP4): 14.8 ML/M^2
BH CV ECHO MEAS - SI(TEICH): 32.4 ML/M^2
BH CV ECHO MEAS - SV(AO): 219.4 ML
BH CV ECHO MEAS - SV(CUBED): 82.6 ML
BH CV ECHO MEAS - SV(LVOT): 57.4 ML
BH CV ECHO MEAS - SV(MOD-SP2): 31 ML
BH CV ECHO MEAS - SV(MOD-SP4): 33 ML
BH CV ECHO MEAS - SV(TEICH): 72.1 ML
BH CV ECHO MEAS - TAPSE (>1.6): 1.3 CM2
BH CV ECHO MEASUREMENTS AVERAGE E/E' RATIO: 7.56
BH CV VAS BP LEFT ARM: NORMAL MMHG
BH CV XLRA - RV BASE: 3.6 CM
BH CV XLRA - RV LENGTH: 7.8 CM
BH CV XLRA - RV MID: 3.4 CM
BH CV XLRA - TDI S': 9.91 CM/SEC
BH CV XLRA MEAS LEFT DIST CCA EDV: 17.5 CM/SEC
BH CV XLRA MEAS LEFT DIST CCA PSV: 60.8 CM/SEC
BH CV XLRA MEAS LEFT DIST ICA EDV: 32.3 CM/SEC
BH CV XLRA MEAS LEFT DIST ICA PSV: 71.6 CM/SEC
BH CV XLRA MEAS LEFT ICA/CCA RATIO: 0.96
BH CV XLRA MEAS LEFT MID CCA EDV: 22.3 CM/SEC
BH CV XLRA MEAS LEFT MID CCA PSV: 79 CM/SEC
BH CV XLRA MEAS LEFT MID ICA EDV: 31.9 CM/SEC
BH CV XLRA MEAS LEFT MID ICA PSV: 76 CM/SEC
BH CV XLRA MEAS LEFT PROX CCA EDV: 21.4 CM/SEC
BH CV XLRA MEAS LEFT PROX CCA PSV: 94.7 CM/SEC
BH CV XLRA MEAS LEFT PROX ECA EDV: 14.4 CM/SEC
BH CV XLRA MEAS LEFT PROX ECA PSV: 82.1 CM/SEC
BH CV XLRA MEAS LEFT PROX ICA EDV: 29.7 CM/SEC
BH CV XLRA MEAS LEFT PROX ICA PSV: 68.1 CM/SEC
BH CV XLRA MEAS LEFT PROX SCLA EDV: 19.6 CM/SEC
BH CV XLRA MEAS LEFT PROX SCLA PSV: 123 CM/SEC
BH CV XLRA MEAS LEFT VERTEBRAL A EDV: 13.8 CM/SEC
BH CV XLRA MEAS LEFT VERTEBRAL A PSV: 33.8 CM/SEC
BH CV XLRA MEAS RIGHT DIST CCA EDV: 21.6 CM/SEC
BH CV XLRA MEAS RIGHT DIST CCA PSV: 69.5 CM/SEC
BH CV XLRA MEAS RIGHT DIST ICA EDV: 35.8 CM/SEC
BH CV XLRA MEAS RIGHT DIST ICA PSV: 74.3 CM/SEC
BH CV XLRA MEAS RIGHT ICA/CCA RATIO: 0.76
BH CV XLRA MEAS RIGHT MID CCA EDV: 32.2 CM/SEC
BH CV XLRA MEAS RIGHT MID CCA PSV: 99.8 CM/SEC
BH CV XLRA MEAS RIGHT MID ICA EDV: 34.6 CM/SEC
BH CV XLRA MEAS RIGHT MID ICA PSV: 75.4 CM/SEC
BH CV XLRA MEAS RIGHT PROX CCA EDV: 29.1 CM/SEC
BH CV XLRA MEAS RIGHT PROX CCA PSV: 117 CM/SEC
BH CV XLRA MEAS RIGHT PROX ECA EDV: 15.7 CM/SEC
BH CV XLRA MEAS RIGHT PROX ECA PSV: 84.1 CM/SEC
BH CV XLRA MEAS RIGHT PROX ICA EDV: 24.8 CM/SEC
BH CV XLRA MEAS RIGHT PROX ICA PSV: 66.4 CM/SEC
BH CV XLRA MEAS RIGHT PROX SCLA EDV: 19.6 CM/SEC
BH CV XLRA MEAS RIGHT PROX SCLA PSV: 112 CM/SEC
BH CV XLRA MEAS RIGHT VERTEBRAL A EDV: 13 CM/SEC
BH CV XLRA MEAS RIGHT VERTEBRAL A PSV: 49.9 CM/SEC
BILIRUB SERPL-MCNC: 0.3 MG/DL (ref 0.2–1.2)
BUN BLD-MCNC: 16 MG/DL (ref 6–20)
BUN/CREAT SERPL: 24.6 (ref 7–25)
CA-I SERPL ISE-MCNC: 1.23 MMOL/L (ref 1.12–1.32)
CALCIUM SPEC-SCNC: 8.7 MG/DL (ref 8.6–10.5)
CHLORIDE SERPL-SCNC: 100 MMOL/L (ref 98–107)
CHOLEST SERPL-MCNC: 239 MG/DL (ref 0–200)
CO2 SERPL-SCNC: 27 MMOL/L (ref 22–29)
CREAT BLD-MCNC: 0.65 MG/DL (ref 0.76–1.27)
DEPRECATED RDW RBC AUTO: 39.7 FL (ref 37–54)
EOSINOPHIL # BLD AUTO: 0.21 10*3/MM3 (ref 0–0.4)
EOSINOPHIL NFR BLD AUTO: 3.2 % (ref 0.3–6.2)
ERYTHROCYTE [DISTWIDTH] IN BLOOD BY AUTOMATED COUNT: 12.7 % (ref 12.3–15.4)
GFR SERPL CREATININE-BSD FRML MDRD: 129 ML/MIN/1.73
GLOBULIN UR ELPH-MCNC: 2.8 GM/DL
GLUCOSE BLD-MCNC: 148 MG/DL (ref 65–99)
GLUCOSE BLDC GLUCOMTR-MCNC: 125 MG/DL (ref 70–130)
GLUCOSE BLDC GLUCOMTR-MCNC: 153 MG/DL (ref 70–130)
GLUCOSE BLDC GLUCOMTR-MCNC: 154 MG/DL (ref 70–130)
GLUCOSE BLDC GLUCOMTR-MCNC: 191 MG/DL (ref 70–130)
HBA1C MFR BLD: 7.4 % (ref 4.8–5.6)
HCT VFR BLD AUTO: 41.2 % (ref 37.5–51)
HDLC SERPL-MCNC: 25 MG/DL (ref 40–60)
HGB BLD-MCNC: 13.7 G/DL (ref 13–17.7)
IMM GRANULOCYTES # BLD AUTO: 0.05 10*3/MM3 (ref 0–0.05)
IMM GRANULOCYTES NFR BLD AUTO: 0.8 % (ref 0–0.5)
INR PPP: 0.99 (ref 0.85–1.16)
LDLC SERPL CALC-MCNC: ABNORMAL MG/DL
LDLC/HDLC SERPL: ABNORMAL {RATIO}
LEFT ATRIUM VOLUME INDEX: 13.9 ML/M^2
LEFT ATRIUM VOLUME: 31 ML
LYMPHOCYTES # BLD AUTO: 1.68 10*3/MM3 (ref 0.7–3.1)
LYMPHOCYTES NFR BLD AUTO: 25.4 % (ref 19.6–45.3)
MAGNESIUM SERPL-MCNC: 1.7 MG/DL (ref 1.6–2.6)
MCH RBC QN AUTO: 28.4 PG (ref 26.6–33)
MCHC RBC AUTO-ENTMCNC: 33.3 G/DL (ref 31.5–35.7)
MCV RBC AUTO: 85.5 FL (ref 79–97)
MONOCYTES # BLD AUTO: 0.51 10*3/MM3 (ref 0.1–0.9)
MONOCYTES NFR BLD AUTO: 7.7 % (ref 5–12)
NEUTROPHILS # BLD AUTO: 4.11 10*3/MM3 (ref 1.7–7)
NEUTROPHILS NFR BLD AUTO: 62.1 % (ref 42.7–76)
NRBC BLD AUTO-RTO: 0 /100 WBC (ref 0–0.2)
PHOSPHATE SERPL-MCNC: 3.9 MG/DL (ref 2.5–4.5)
PLATELET # BLD AUTO: 247 10*3/MM3 (ref 140–450)
PMV BLD AUTO: 9.6 FL (ref 6–12)
POTASSIUM BLD-SCNC: 3.9 MMOL/L (ref 3.5–5.2)
PROT SERPL-MCNC: 6.7 G/DL (ref 6–8.5)
PROTHROMBIN TIME: 12.6 SECONDS (ref 11.2–14.3)
RBC # BLD AUTO: 4.82 10*6/MM3 (ref 4.14–5.8)
RIGHT ARM BP: NORMAL MMHG
SODIUM BLD-SCNC: 135 MMOL/L (ref 136–145)
TRIGL SERPL-MCNC: 824 MG/DL (ref 0–150)
VLDLC SERPL-MCNC: ABNORMAL MG/DL
WBC NRBC COR # BLD: 6.61 10*3/MM3 (ref 3.4–10.8)

## 2019-10-11 PROCEDURE — 83735 ASSAY OF MAGNESIUM: CPT | Performed by: NURSE PRACTITIONER

## 2019-10-11 PROCEDURE — 97162 PT EVAL MOD COMPLEX 30 MIN: CPT

## 2019-10-11 PROCEDURE — 82330 ASSAY OF CALCIUM: CPT | Performed by: NURSE PRACTITIONER

## 2019-10-11 PROCEDURE — 83721 ASSAY OF BLOOD LIPOPROTEIN: CPT | Performed by: NURSE PRACTITIONER

## 2019-10-11 PROCEDURE — 97165 OT EVAL LOW COMPLEX 30 MIN: CPT

## 2019-10-11 PROCEDURE — 80061 LIPID PANEL: CPT | Performed by: NURSE PRACTITIONER

## 2019-10-11 PROCEDURE — 99232 SBSQ HOSP IP/OBS MODERATE 35: CPT | Performed by: INTERNAL MEDICINE

## 2019-10-11 PROCEDURE — 92610 EVALUATE SWALLOWING FUNCTION: CPT

## 2019-10-11 PROCEDURE — 93306 TTE W/DOPPLER COMPLETE: CPT | Performed by: INTERNAL MEDICINE

## 2019-10-11 PROCEDURE — 92523 SPEECH SOUND LANG COMPREHEN: CPT

## 2019-10-11 PROCEDURE — 85610 PROTHROMBIN TIME: CPT | Performed by: NURSE PRACTITIONER

## 2019-10-11 PROCEDURE — 99255 IP/OBS CONSLTJ NEW/EST HI 80: CPT | Performed by: PSYCHIATRY & NEUROLOGY

## 2019-10-11 PROCEDURE — 82962 GLUCOSE BLOOD TEST: CPT

## 2019-10-11 PROCEDURE — 0 GADOBENATE DIMEGLUMINE 529 MG/ML SOLUTION: Performed by: INTERNAL MEDICINE

## 2019-10-11 PROCEDURE — 80053 COMPREHEN METABOLIC PANEL: CPT | Performed by: NURSE PRACTITIONER

## 2019-10-11 PROCEDURE — 84100 ASSAY OF PHOSPHORUS: CPT | Performed by: NURSE PRACTITIONER

## 2019-10-11 PROCEDURE — 70553 MRI BRAIN STEM W/O & W/DYE: CPT

## 2019-10-11 PROCEDURE — 85025 COMPLETE CBC W/AUTO DIFF WBC: CPT | Performed by: NURSE PRACTITIONER

## 2019-10-11 PROCEDURE — 93880 EXTRACRANIAL BILAT STUDY: CPT

## 2019-10-11 PROCEDURE — 93880 EXTRACRANIAL BILAT STUDY: CPT | Performed by: INTERNAL MEDICINE

## 2019-10-11 PROCEDURE — A9577 INJ MULTIHANCE: HCPCS | Performed by: INTERNAL MEDICINE

## 2019-10-11 PROCEDURE — 93306 TTE W/DOPPLER COMPLETE: CPT

## 2019-10-11 PROCEDURE — 83036 HEMOGLOBIN GLYCOSYLATED A1C: CPT | Performed by: NURSE PRACTITIONER

## 2019-10-11 PROCEDURE — 70450 CT HEAD/BRAIN W/O DYE: CPT

## 2019-10-11 RX ADMIN — INSULIN LISPRO 2 UNITS: 100 INJECTION, SOLUTION INTRAVENOUS; SUBCUTANEOUS at 08:59

## 2019-10-11 RX ADMIN — GADOBENATE DIMEGLUMINE 15 ML: 529 INJECTION, SOLUTION INTRAVENOUS at 16:30

## 2019-10-11 RX ADMIN — SODIUM CHLORIDE, PRESERVATIVE FREE 10 ML: 5 INJECTION INTRAVENOUS at 09:00

## 2019-10-11 NOTE — THERAPY DISCHARGE NOTE
Acute Care - Occupational Therapy Initial Eval/Discharge  [facility]     Patient Name: Vaughn Huynh  : 1965  MRN: [id number]  Today's Date: 10/11/2019  Onset of Illness/Injury or Date of Surgery: 10/10/19  Date of Referral to OT: 10/10/19         Admit Date: 10/10/2019       ICD-10-CM ICD-9-CM   1. Cerebrovascular accident (CVA), unspecified mechanism (CMS/HCC) I63.9 434.91     Patient Active Problem List   Diagnosis   • Abnormal liver enzymes   • Excessive sweating   • Ganglion   • Heartburn   • Hyperlipidemia   • Hypertension   • Acute benign hemorrhagic glomerulonephritic syndrome   • Influenza due to influenza A virus   • Knee pain   • Myopathy   • Obstructive sleep apnea syndrome   • Osteoarthritis   • Postnasal drip   • Impaired glucose tolerance   • Nerve root disorder   • Shortness of breath   • Sore throat   • Parageusia   • Urinary symptom or sign   • Vitamin D deficiency   • Hematuria   • Elevated liver function tests   • Colon cancer screening   • Bilateral shoulder pain   • Eczema   • Need for vaccination   • Encounter for long-term current use of medication   • Nocturia   • Acute meniscal tear of right knee   • Rectus diastasis   • Suspected cerebrovascular accident (CVA) s/p TPA   • Cerebrovascular accident (CVA) (CMS/HCC)     Past Medical History:   Diagnosis Date   • Adult BMI 27.0-27.9 kg/sq m    • Cerebrovascular accident (CVA) (CMS/HCC) 10/11/2019   • Elevated cholesterol    • Esophagitis    • Heartburn    • Hyperlipidemia 2016   • Hypertension 2016   • Knee pain     right   • Myopathy 2016   • Osteoarthritis 2016    right knee     Past Surgical History:   Procedure Laterality Date   • BACK SURGERY            OT ASSESSMENT FLOWSHEET (last 12 hours)      Occupational Therapy Evaluation     Row Name 10/11/19 1047                   OT Evaluation Time/Intention    Subjective Information  no complaints  -CL        Document Type  discharge evaluation/summary  -CL         Mode of Treatment  occupational therapy  -CL        Patient Effort  excellent  -CL        Symptoms Noted During/After Treatment  none  -CL           General Information    Patient Profile Reviewed?  yes  -CL        Onset of Illness/Injury or Date of Surgery  10/10/19  -CL        Prior Level of Function  independent:;all household mobility;transfer;ADL's;dressing;bathing;driving  -CL        Equipment Currently Used at Home  none  -CL        Existing Precautions/Restrictions  fall  -CL        Risks Reviewed  patient:;LOB;nausea/vomiting;dizziness;increased discomfort;change in vital signs;lines disloged  -CL        Benefits Reviewed  patient:;improve function;increase independence;increase strength;increase balance;decrease pain;increase knowledge  -CL        Barriers to Rehab  none identified  -CL           Relationship/Environment    Lives With  significant other  -CL           Resource/Environmental Concerns    Current Living Arrangements  home/apartment/condo  -CL           Cognitive Assessment/Interventions    Additional Documentation  Cognitive Assessment/Intervention (Group)  -CL           Cognitive Assessment/Intervention- PT/OT    Affect/Mental Status (Cognitive)  WFL  -CL        Orientation Status (Cognition)  oriented x 4  -CL        Follows Commands (Cognition)  WFL  -CL        Cognitive Function (Cognitive)  WFL  -CL           Bed Mobility Assessment/Treatment    Bed Mobility Assessment/Treatment  supine-sit  -CL        Supine-Sit [place] (Bed Mobility)  conditional independence  -CL        Assistive Device (Bed Mobility)  head of bed elevated  -CL           Functional Mobility    Functional Mobility- Ind. Level  standby assist  -CL        Functional Mobility-Distance (Feet)  20  -CL        Functional Mobility- Comment  Pt ambulated to/from the bathroom  -CL           Transfer Assessment/Treatment    Transfer Assessment/Treatment  sit-stand transfer;stand-sit transfer  -CL           Sit-Stand  Transfer    Sit-Stand [place] (Transfers)  independent  -CL           Stand-Sit Transfer    Stand-Sit [place] (Transfers)  independent  -CL           ADL Assessment/Intervention    BADL Assessment/Intervention  toileting;grooming  -CL           Grooming Assessment/Training    [place] Level (Grooming)  wash face, hands;independent  -CL        Grooming Position  unsupported standing;sink side  -CL           Toileting Assessment/Training    [place] Level (Toileting)  adjust/manage clothing;perform perineal hygiene;independent  -CL        Toileting Position  unsupported standing  -CL           General ROM    GENERAL ROM COMMENTS  BUE grossly WFL.   -CL           MMT (Manual Muscle Testing)    General MMT Comments  BUE grossly WFL.   -CL           Motor Assessment/Interventions    Additional Documentation  Balance (Group);Therapeutic Exercise (Group);Gross Motor Coordination (Group)  -CL           Gross Motor Coordination    Gross Motor Impairments  finger to nose  -CL        Gross Motor Skill, Impairments Detail  BUE WFL.   -CL           Balance    Balance  static sitting balance;static standing balance  -CL           Static Sitting Balance    Level of [place] (Unsupported Sitting, Static Balance)  independent  -CL        Sitting Position (Unsupported Sitting, Static Balance)  sitting on edge of bed  -CL           Static Standing Balance    Level of [place] (Supported Standing, Static Balance)  independent  -CL           Sensory Assessment/Intervention    Sensory General Assessment  no sensation deficits identified  -CL        Additional Documentation  Vision Assessment/Intervention (Group)  -CL           Vision Assessment/Intervention    Visual Impairment/Limitations  WFL  -CL           Positioning and Restraints    Pre-Treatment Position  in bed  -CL        Post Treatment Position  chair  -CL        In Chair  notified nsg;sitting;call light within reach;encouraged to call for assist;exit  alarm on  -CL           Pain Assessment    Additional Documentation  Pain Scale: Numbers Pre/Post-Treatment (Group)  -CL           Pain Scale: Numbers Pre/Post-Treatment    Pain Scale: Numbers, Pretreatment  0/10 - no pain  -CL        Pain Scale: Numbers, Post-Treatment  0/10 - no pain  -CL           Clinical Impression (OT)    Date of Referral to OT  10/10/19  -CL        OT Diagnosis  Pt at baseline ADL performance.   -CL        Patient/Family Goals Statement (OT Eval)  Return home.   -CL        Criteria for Skilled Therapeutic Interventions Met (OT Eval)  no;no problems identified which require skilled intervention  -CL        Anticipated Discharge Disposition (OT)  home with assist  -CL           Vital Signs    Pre Systolic BP Rehab  129  -CL        Pre Treatment Diastolic BP  100  -CL        Post Systolic BP Rehab  144  -CL        Post Treatment Diastolic BP  100  -CL        Pretreatment Heart Rate (beats/min)  87  -CL        Posttreatment Heart Rate (beats/min)  85  -CL        Pre SpO2 (%)  98  -CL        O2 Delivery Pre Treatment  room air  -CL        Post SpO2 (%)  98  -CL        O2 Delivery Post Treatment  room air  -CL        Pre Patient Position  Supine  -CL        Intra Patient Position  Standing  -CL        Post Patient Position  Sitting  -CL           Living Environment    Home Accessibility  -- has stairs to basement, but can avoid  -CL          User Key  (r) = Recorded By, (t) = Taken By, (c) = Cosigned By    Initials Name Effective Dates    CL Rowan Alegre, HARPREET 04/03/18 -           Occupational Therapy Education     Title: PT OT SLP Therapies (In Progress)     Topic: Occupational Therapy (In Progress)     Point: ADL training (Done)     Description: Instruct learner(s) on proper safety adaptation and remediation techniques during self care or transfers.   Instruct in proper use of assistive devices.    Learning Progress Summary           Patient RAZA Buckner VU by ANCELMO at 10/11/2019 10:47 AM                    Point: Precautions (Done)     Description: Instruct learner(s) on prescribed precautions during self-care and functional transfers.    Learning Progress Summary           Patient Acceptance, E, VU by  at 10/11/2019 10:47 AM                   Point: Body mechanics (Done)     Description: Instruct learner(s) on proper positioning and spine alignment during self-care, functional mobility activities and/or exercises.    Learning Progress Summary           Patient Acceptance, E, VU by  at 10/11/2019 10:47 AM                               User Key     Initials Effective Dates Name Provider Type Discipline     04/03/18 -  Rowan Alegre, OT Occupational Therapist OT                OT Recommendation and Plan  Outcome Summary/Treatment Plan (OT)  Anticipated Discharge Disposition (OT): home with assist  Plan of Care Review  Plan of Care Reviewed With: patient  Plan of Care Reviewed With: patient  Outcome Summary: OT completed a brief chart review. Pt is independent w/ ADLs and mobility. Pt has no further deficits which require skilled IPOT intervention, will sign off. Recommend pt DC home w/ assist.          Outcome Measures     Row Name 10/11/19 1047             How much help from another is currently needed...    Putting on and taking off regular lower body clothing?  4  -CL      Bathing (including washing, rinsing, and drying)  4  -CL      Toileting (which includes using toilet bed pan or urinal)  4  -CL      Putting on and taking off regular upper body clothing  4  -CL      Taking care of personal grooming (such as brushing teeth)  4  -CL      Eating meals  4  -CL      AM-PAC 6 Clicks Score (OT)  24  -CL         Modified Essence Scale    Pre-Stroke Modified [place] Scale  0 - No Symptoms at all.  -CL      Modified [place] Scale  0 - No Symptoms at all.  -CL         Functional Assessment    Outcome Measure Options  AM-PAC 6 Clicks Daily Activity (OT);Modified [place]  -CL        User Key  (r) = Recorded By, (t) =  Taken By, (c) = Cosigned By    Initials Name Provider Type    CL Rowan Alegre OT Occupational Therapist          Time Calculation:   Time Calculation- OT     Row Name 10/11/19 1047             Time Calculation- OT    OT Start Time  1047  -CL      OT Received On  10/11/19  -CL      OT Goal Re-Cert Due Date  10/21/19  -CL        User Key  (r) = Recorded By, (t) = Taken By, (c) = Cosigned By    Initials Name Provider Type    Rowan Souza OT Occupational Therapist        Therapy Suggested Charges     Code   Minutes Charges    None           Therapy Charges for Today     Code Description Service Date Service Provider Modifiers Qty    [id number]  OT EVAL LOW COMPLEXITY 4 10/11/2019 Rowan Alegre OT GO 1               OT Discharge Summary  Anticipated Discharge Disposition (OT): home with assist  Reason for Discharge: At baseline function  Outcomes Achieved: Refer to plan of care for updates on goals achieved  Discharge Destination: Home with assist    Rowan Alegre OT  10/11/2019

## 2019-10-11 NOTE — PLAN OF CARE
Problem: Patient Care Overview  Goal: Plan of Care Review  Outcome: Ongoing (interventions implemented as appropriate)   10/11/19 0436   Coping/Psychosocial   Plan of Care Reviewed With patient;significant other   Plan of Care Review   Progress improving   OTHER   Outcome Summary Patient arrived to floor, NIH 6, left facial droop, expressive aphasia, left side weakness, patient does have some inattention to the left side with bilateral stimulation, VSS, will continue to monitor       Problem: Stroke (Ischemic) (Adult)  Goal: Signs and Symptoms of Listed Potential Problems Will be Absent, Minimized or Managed (Stroke)  Outcome: Ongoing (interventions implemented as appropriate)   10/11/19 0436   Goal/Outcome Evaluation   Problems Assessed (Stroke (Ischemic)) all   Problems Assessed (Stroke (Ischemic)) acute neurologic deterioration;communication impairment;eating/swallowing impairment;motor/sensory impairment;situational response       Problem: Thrombolytic Therapy (Adult)  Goal: Signs and Symptoms of Listed Potential Problems Will be Absent, Minimized or Managed (Thrombolytic Therapy)  Outcome: Ongoing (interventions implemented as appropriate)   10/11/19 0436   Goal/Outcome Evaluation   Problems Assessed (Thrombolytic Therapy) all   Problems Assessed (Thrombolytic Therapy) none

## 2019-10-11 NOTE — H&P
CRITICAL CARE ADMISSION NOTE    Chief Complaint     Suspected cerebrovascular accident (CVA)    History of Present Illness     Mr. Huynh is a 53-year-old male with past medical history of GERD, hyperlipidemia, hypertension, osteoarthritis of the right knee and obesity who presented today to the ER with concern for stroke.  Per EMS he began experiencing double vision around 2030 today.  Per the wife's reports he called her concerned with the double vision. They had a normal conversation and she asked him to check his blood sugar prior to him hanging up.  Shortly after he again called his wife and at that point was experiencing confused and slurred speech so EMS was called. En route to the hospital patient was experiencing left-sided weakness, left facial droop, and abnormal gait.  On arrival to the hospital NIH documented at 6.  Patient's blood pressure was 147/104, temp 99.1, heart rate 92, respiratory rate 20.  Significant labs were all negative including white count of 7.6, hemoglobin 14.4, platelets 270, troponin less than 0.010, AST 21, ALT 33.  CT stroke protocol was suspicious for small cyst acute or subacute infarct in the head of the right caudate nucleus without evidence of intracranial hemorrhage.  CT angiogram head and neck (PENDING).  CT perfusion with optimal atrial input function and perfusion maps with symmetry and without evidence of cerebral ischemia or cord infarction.  He did receive TPA in the ED.  Patient will be admitted to the ICU for further evaluation and management post TPA.    On arrival to ICU alert and oriented x4.  He does continue to have some left facial droop most notably in the left eye.  NIH on arrival to ICU is 6, TPA is not almost complete.    Problem List, Surgical History, Family, Social History, and ROS     Past Medical History:   Diagnosis Date   • Adult BMI 27.0-27.9 kg/sq m    • Esophagitis    • Heartburn    • Hyperlipidemia 7/18/2016   • Hypertension 7/18/2016   • Knee  pain     right   • Myopathy 2016   • Osteoarthritis 2016    right knee     Past Surgical History:   Procedure Laterality Date   • BACK SURGERY         Allergies   Allergen Reactions   • Statins Other (See Comments)     Other reaction(s): Rhabdomyolysis     No current facility-administered medications on file prior to encounter.      Current Outpatient Medications on File Prior to Encounter   Medication Sig   • aspirin 81 MG tablet Take  by mouth daily.   • esomeprazole (nexIUM) 40 MG capsule Take 40 mg by mouth Every Morning Before Breakfast.   • lisinopril-hydrochlorothiazide (PRINZIDE,ZESTORETIC) 20-12.5 MG per tablet TAKE 1 TABLET TWICE A DAY   • METFORMIN HCL PO Take 1 tablet by mouth 2 (Two) Times a Day.   • [DISCONTINUED] atorvastatin (LIPITOR) 20 MG tablet Take  by mouth Take As Directed.   • [DISCONTINUED] cyclobenzaprine (FLEXERIL) 10 MG tablet Take 1 tablet by mouth 3 (Three) Times a Day As Needed for muscle spasms.   • [DISCONTINUED] esomeprazole (NEXIUM 24HR) 20 MG capsule Take 20 mg by mouth every other day.   • [DISCONTINUED] fluocinonide-emollient (LIDEX-E) 0.05 % cream Apply  topically 2 (Two) Times a Day.   • [DISCONTINUED] meloxicam (MOBIC) 15 MG tablet Take 1 tablet by mouth Daily.     MEDICATION LIST AND ALLERGIES REVIEWED.    Family History   Problem Relation Age of Onset   • Cancer Mother         kindney   • Dementia Father    • Hypertension Father    • Diabetes Father    • Seizures Other    • Cancer Other    • Diabetes Other    • Hypertension Other    • Arthritis Other    • Hypertension Other    • Colon cancer Neg Hx    • Liver cancer Neg Hx    • Liver disease Neg Hx    • Esophageal cancer Neg Hx    • Stomach cancer Neg Hx      Social History     Tobacco Use   • Smoking status: Former Smoker     Packs/day: 1.00     Types: Cigarettes     Start date: 1983     Last attempt to quit: 2006     Years since quittin.7   • Smokeless tobacco: Never Used   • Tobacco comment:  "quit x 10 years   Substance Use Topics   • Alcohol use: Yes     Comment: VERY RARE    • Drug use: No     Social History     Social History Narrative   • Not on file     FAMILY AND SOCIAL HISTORY REVIEWED.    Review of Systems   Neurological: Positive for speech difficulty and weakness.   Psychiatric/Behavioral: Positive for confusion.     AS ABOVE OR ALL OTHER SYSTEMS REVIEWED AND ARE NEGATIVE.    Physical Exam and Clinical Information   /89   Pulse 94   Temp 99.1 °F (37.3 °C) (Oral)   Resp 20   Ht 182.9 cm (72\")   Wt 98.6 kg (217 lb 6 oz)   SpO2 95%   BMI 29.48 kg/m²   Physical Exam:     General Appearance: Awake, in no acute distress, expressive aphasia.   Head:    Atraumatic, Normocephalic, without obvious abnormality, Pupils reactive & symmetrical B/L. Left facial weakness, unable to close left eyelid.   Neck:   Supple,  trachea midline.   Lungs:   B/L Breath sounds present with decreased breath sounds on bases, no wheezing heard, no crackles.   Heart: S1 and S2 present, no murmur  Abdomen: Soft, non-tender, no guarding or rigidity, bowel sounds positive.  Extremities: Atraumatic, no cyanosis or clubbing,  no edema, warm to touch.  Pulses: Positive and symmetric.  Neurologic:  Moving all four extremities. Good strength bilaterally. Tongue with left deviation. Left central facial N weakness. Expressive aphasia.       Results from last 7 days   Lab Units 10/10/19  2201 10/10/19  2159   WBC 10*3/mm3 7.62  --    HEMOGLOBIN g/dL 14.4  --    HEMOGLOBIN, POC g/dL  --  14.3   PLATELETS 10*3/mm3 270  --            Invalid input(s): CHLORIDE  CrCl cannot be calculated (Patient's most recent lab result is older than the maximum 30 days allowed.).      Results from last 7 days   Lab Units 10/10/19  2159   PH, ARTERIAL pH units 7.37     No results found for: LACTATE     IMAGES:     CT brain and CTA reports reviewed. Normal perfusion.     CXR reviewed personally and no acute pathology noted.       I reviewed the " patient's results and images.     Assesment     Active Hospital Problems    Diagnosis   • **Suspected cerebrovascular accident (CVA) s/p TPA   • Obstructive sleep apnea syndrome   • Hypertension   • Hyperlipidemia     Plan/Recommendations     1. Admit to ICU  2. Post TPA protocol including 24-hour post TPA CT head ordered  3. A.m. labs pending  4. Aspirin ordered  5. Per documentation patient is allergic to statins with reaction reportedly rhabdomyolysis will hold off on adding statin at this time  6. Cardene drip ordered for systolic blood pressure less than or equal to 180 and diastolic blood pressure less than or equal to 105  7. Patient will remain n.p.o. until swallow study performed  8. Echo and bilateral lower carotid duplexes ordered for tomorrow  9. Sliding scale lispro ordered for correction  10. Supportive care      Irina Mcadams, MSN, AGACNP-BC, APRN    ADDENDUM:  I have seen and examined patient, performing a face-to-face diagnostic evaluation with plan of care reviewed and developed with APRN and nursing staff. I have addended and modified the above history of present illness, conducted physical examination and documented as above, and assessment and plan to reflect my findings and impressions.     Patient presenting with acute onset of left-sided weakness along with slurring of speech but acutely around 8 PM.  Now status post TPA.  Cerebral perfusion study was normal.  Neurology has been consulted.  Wife thinks patient strength is improving on the left side but still with significant left facial nerve involvement as well as expressive aphasia.  tPA is just about to finish.  We will monitor him and monitor post TPA complications.  Routine orders written.  Will get echocardiogram to rule out embolic disease.  Check carotid duplex as well.  No carotid dissection noted on the CTA.    Physical therapy, Occupational Therapy and speech to evaluate the patient in the morning.    GI prophylaxis added.  Monitor  blood glucose and insulin as needed for hyperglycemia management.    Patient does have history of sleep apnea not using CPAP currently.  Discussed with wife that sleep apnea is a significant risk factor for stroke along with diabetes as well.  He will benefit from sleep clinic follow-up post discharge for management of his sleep apnea and to prevent recurrence.  He verbalized understanding.      Remote smoking and no current alcohol abuse history.      Level of Risk High due to:  illness with threat to life or bodily function    Critical Care time spent in direct patient care: 30 minutes (excluding procedure time, if applicable) including high complexity decision making to assess, manipulate, and support vital organ system failure in this individual who has impairment of one or more vital organ systems such that there is a high probability of imminent or life threatening deterioration in the patient’s condition.      CC: Sen Nichole MD

## 2019-10-11 NOTE — PROGRESS NOTES
"Critical Care Note     LOS: 1 day   Patient Care Team:  Sen Nichole MD as PCP - General (Internal Medicine)    Chief Complaint/Reason for visit:    Chief Complaint   Patient presents with   • Stroke       Subjective     53-year-old gentleman with high blood pressure, dyslipidemia presenting with double vision, slurred speech and confusion with left-sided weakness.  Initial NIH stroke scale was a 6.  He received TPA.  CT stroke protocol revealed a possible subacute infarct in the right caudate nucleus.    Interval History:     This morning his NIH stroke scale is a 2 for mild sensory deficit left side and facial droop  Physical therapy noted a mild balance deficit.  He ambulated 400 feet.  OT noted independence with ADLs and mobility and signed off  Speech noted that he had no difficulty swallowing and recommended a regular diet with thin liquids.      History taken from: patient    Review of Systems:    All systems were reviewed and negative except as noted in subjective.    Medical history, surgical history, social history, family history reviewed    Objective     Intake/Output:    Intake/Output Summary (Last 24 hours) at 10/11/2019 1532  Last data filed at 10/11/2019 1200  Gross per 24 hour   Intake 580 ml   Output --   Net 580 ml       Nutrition:  Diet Regular; Cardiac, Consistent Carbohydrate, Low Fat    Infusions:    niCARdipine 5-15 mg/hr         Telemetry: Sinus rhythm             Vital Signs  Blood pressure 122/84, pulse 76, temperature 98.8 °F (37.1 °C), temperature source Oral, resp. rate 20, height 188 cm (74\"), weight 95.7 kg (211 lb), SpO2 95 %.    Physical Exam:  General Appearance:   Well-developed middle-aged gentleman in no distress   Head:   Atraumatic   Eyes:          No jaundice   Ears:     Throat:  Oral mucosa moist   Neck:  No carotid bruit.  Trachea midline   Back:      Lungs:    Symmetric chest expansion without wheeze or rhonchi    Heart:   Regular rhythm, S1, S2 auscultated   Abdomen: "    Bowel sounds present, nondistended, soft   Rectal:   Deferred   Extremities:  No pitting edema or cyanosis   Pulses:    Skin:  Warm and dry   Lymph nodes:    Neurologic:  Oriented x3, speech is fluent, no visual field deficit, pupils are equal and reactive to light.  Extraocular movements intact.  Mild left facial weakness.  No motor drift upper or lower extremities.  Normal gait      Results Review:     I reviewed the patient's new clinical results.   Results from last 7 days   Lab Units 10/11/19  0456 10/10/19  2201   SODIUM mmol/L 135*  --    POTASSIUM mmol/L 3.9  --    CHLORIDE mmol/L 100  --    CO2 mmol/L 27.0  --    BUN mg/dL 16  --    CREATININE mg/dL 0.65*  --    CALCIUM mg/dL 8.7  --    BILIRUBIN mg/dL 0.3  --    ALK PHOS U/L 67  --    ALT (SGPT) U/L 31 33   AST (SGOT) U/L 18 21   GLUCOSE mg/dL 148*  --      Results from last 7 days   Lab Units 10/11/19  0456 10/10/19  2201 10/10/19  2159   WBC 10*3/mm3 6.61 7.62  --    HEMOGLOBIN g/dL 13.7 14.4  --    HEMOGLOBIN, POC g/dL  --   --  14.3   HEMATOCRIT % 41.2 43.6  --    HEMATOCRIT POC %  --   --  42   PLATELETS 10*3/mm3 247 270  --      Results from last 7 days   Lab Units 10/10/19  2159   PH, ARTERIAL pH units 7.37     No results found for: BLOODCX  No results found for: URINECX    I reviewed the patient's new imaging including images and reports.  Interpretation Summary     · Left ventricular systolic function is normal.  · Calculated EF = 52.0%. Estimated EF appears to be in the range of 56 - 60%.  · Left ventricular diastolic dysfunction (grade I) consistent with impaired relaxation.  · Saline test results are positive for right to left atrial level shunt.         All medications reviewed.     aspirin 325 mg Oral Daily   Or      aspirin 300 mg Rectal Daily   insulin lispro 0-9 Units Subcutaneous 4x Daily With Meals & Nightly   sodium chloride 10 mL Intravenous Q12H   sodium chloride 100 mL/hr Intravenous Once         Assessment/Plan       Suspected  cerebrovascular accident (CVA) s/p TPA    Hyperlipidemia    Hypertension    Obstructive sleep apnea syndrome    Cerebrovascular accident (CVA) (CMS/HCC)    Middle-aged gentleman presenting with some left-sided symptoms and visual complaints suspicious for a stroke.  He received TPA.  NIH stroke score improved from a 6 to a 2.  Echo reveals a right to left shunt.  Carotid duplex is normal    PLAN:  Follow-up CT scan at midnight  Continue aspirin  Allergy to statin  Monitor blood pressure and initiate therapy as needed  Continue to monitor NIH stroke scale  Sliding scale insulin  Consider starting Eliquis in the morning because of positive saline test      Radha Austin MD  10/11/19  3:32 PM      Time: 20min

## 2019-10-11 NOTE — CONSULTS
Inpatient Neurology Consult Stroke  Consult performed by: Carlos Vasquez MD  Consult ordered by: Delano Miranda DO  Reason for consult: stroke sx           Patient Care Team:  Sen Nichole MD as PCP - General (Internal Medicine)    Chief complaint: stroke sx    Subjective     History of Present Illness    53 y.o. male referred by Dr Miranda for stroke sx.  Presented to [facility] ED on 10/10/19 with complaints of diplopia, confusion and slurred speech.  En route to ED developed left sided weakness.  Sx started at 2030.  Discussed with Dr Miranda and proceeded with IV tPA.     HCT/CTP/CTA, my review of films,  unremarkable    A1C 7.4; chol 239   CBC,CMP, PTT - NCS     Left sided weakness resolved this am.      Review of Systems   Constitutional: Negative for activity change, fatigue and unexpected weight change.   HENT: Negative for facial swelling, hearing loss, tinnitus, trouble swallowing and voice change.    Eyes: Positive for visual disturbance. Negative for photophobia and pain.   Respiratory: Negative for apnea, cough and choking.    Cardiovascular: Negative for chest pain.   Gastrointestinal: Negative for constipation, nausea and vomiting.   Endocrine: Negative for cold intolerance.   Genitourinary: Negative for difficulty urinating, frequency and urgency.   Musculoskeletal: Positive for gait problem. Negative for arthralgias, back pain, myalgias, neck pain and neck stiffness.   Skin: Negative for rash.   Allergic/Immunologic: Negative for immunocompromised state.   Neurological: Positive for speech difficulty and weakness. Negative for dizziness, tremors, seizures, syncope, facial asymmetry, light-headedness, numbness and headaches.   Hematological: Negative for adenopathy.   Psychiatric/Behavioral: Positive for decreased concentration. Negative for confusion, hallucinations and sleep disturbance. The patient is not nervous/anxious.         Past Medical History:   Diagnosis Date   • Adult BMI  27.0-27.9 kg/sq m    • Elevated cholesterol    • Esophagitis    • Heartburn    • Hyperlipidemia 2016   • Hypertension 2016   • Knee pain     right   • Myopathy 2016   • Osteoarthritis 2016    right knee   ,   Past Surgical History:   Procedure Laterality Date   • BACK SURGERY     ,   Family History   Problem Relation Age of Onset   • Cancer Mother         kindney   • Dementia Father    • Hypertension Father    • Diabetes Father    • Seizures Other    • Cancer Other    • Diabetes Other    • Hypertension Other    • Arthritis Other    • Hypertension Other    • Colon cancer Neg Hx    • Liver cancer Neg Hx    • Liver disease Neg Hx    • Esophageal cancer Neg Hx    • Stomach cancer Neg Hx    ,   Social History     Tobacco Use   • Smoking status: Former Smoker     Packs/day: 1.00     Types: Cigarettes     Start date: 1983     Last attempt to quit: 2006     Years since quittin.7   • Smokeless tobacco: Never Used   • Tobacco comment: quit x 10 years   Substance Use Topics   • Alcohol use: Yes     Comment: VERY RARE    • Drug use: No   ,   Medications Prior to Admission   Medication Sig Dispense Refill Last Dose   • aspirin 81 MG tablet Take  by mouth daily.   Taking   • esomeprazole (nexIUM) 40 MG capsule Take 40 mg by mouth Every Morning Before Breakfast.      • lisinopril-hydrochlorothiazide (PRINZIDE,ZESTORETIC) 20-12.5 MG per tablet TAKE 1 TABLET TWICE A  tablet 2 Taking   • METFORMIN HCL PO Take 1 tablet by mouth 2 (Two) Times a Day.      , Scheduled Meds:    aspirin 325 mg Oral Daily   Or      aspirin 300 mg Rectal Daily   insulin lispro 0-9 Units Subcutaneous 4x Daily With Meals & Nightly   sodium chloride 10 mL Intravenous Q12H   sodium chloride 100 mL/hr Intravenous Once   , Continuous Infusions:    niCARdipine 5-15 mg/hr   , PRN Meds:  dextrose  •  dextrose  •  glucagon (human recombinant)  •  sodium chloride  •  sodium chloride and Allergies:  Statins    Objective       Vital Signs  Temp:  [97.8 °F (36.6 °C)-99.1 °F (37.3 °C)] 97.8 °F (36.6 °C)  Heart Rate:  [73-95] 73  Resp:  [16-20] 18  BP: (106-147)/() 128/79    Physical Exam   Constitutional: He is oriented to person, place, and time. Vital signs are normal. He appears well-developed and well-nourished.   HENT:   Head: Normocephalic and atraumatic.   Eyes: EOM and lids are normal. Pupils are equal, round, and reactive to light.   Fundoscopic exam:       The right eye shows no exudate, no hemorrhage and no papilledema. The right eye shows venous pulsations.        The left eye shows no exudate, no hemorrhage and no papilledema. The left eye shows venous pulsations.   Neck: Normal range of motion and phonation normal. Normal carotid pulses present. Carotid bruit is not present. No thyroid mass and no thyromegaly present.   Cardiovascular: Normal rate, regular rhythm and normal heart sounds.   Pulmonary/Chest: Effort normal.   Neurological: He is oriented to person, place, and time. He has normal strength. He has a normal Finger-Nose-Finger Test, a normal Heel to Shin Test, a normal Romberg Test and a normal Tandem Gait Test. Gait normal.   Skin: Skin is warm and dry.   Psychiatric: He has a normal mood and affect. His speech is normal.   Nursing note and vitals reviewed.    Neurologic Exam     Mental Status   Oriented to person, place, and time.   Registration: recalls 3 of 3 objects. Recall at 5 minutes: recalls 3 of 3 objects. Follows 3 step commands.   Attention: normal. Concentration: normal.   Speech: speech is normal   Level of consciousness: alert  Knowledge: good and consistent with education.   Able to name object. Able to read. Able to repeat. Able to write. Normal comprehension.     Cranial Nerves     CN II   Visual fields full to confrontation.   Visual acuity: normal  Right visual field deficit: none  Left visual field deficit: none     CN III, IV, VI   Pupils are equal, round, and reactive to  light.  Extraocular motions are normal.   Right pupil: Shape: regular. Reactivity: brisk. Consensual response: intact.   Left pupil: Shape: regular. Reactivity: brisk. Consensual response: intact.   Nystagmus: none   Diplopia: none  Ophthalmoparesis: none  Upgaze: normal  Downgaze: normal  Conjugate gaze: present  Vestibulo-ocular reflex: present    CN V   Facial sensation intact.   Right corneal reflex: normal  Left corneal reflex: normal    CN VII   Right facial weakness: none  Left facial weakness: central    CN VIII   Hearing: intact    CN IX, X   Palate: symmetric  Right gag reflex: normal  Left gag reflex: normal    CN XI   Right sternocleidomastoid strength: normal  Left sternocleidomastoid strength: normal    CN XII   Tongue: not atrophic  Fasciculations: absent  Tongue deviation: none    Motor Exam   Muscle bulk: normal  Overall muscle tone: normal  Right arm tone: normal  Left arm tone: normal  Right leg tone: normal  Left leg tone: normal    Strength   Strength 5/5 throughout.     Sensory Exam   Light touch normal.   Vibration normal.   Proprioception normal.   Pinprick normal.     Gait, Coordination, and Reflexes     Gait  Gait: normal    Coordination   Romberg: negative  Finger to nose coordination: normal  Heel to shin coordination: normal  Tandem walking coordination: normal    Tremor   Resting tremor: absent  Intention tremor: absent  Action tremor: absent    Reflexes   Reflexes 2+ except as noted.        Results Review:    I reviewed the patient's new clinical results.  I reviewed the patient's new imaging results and agree with the interpretation.  I reviewed the patient's other test results and agree with the interpretation  Discussed with Dr Miranda        Assessment/Plan       Suspected cerebrovascular accident (CVA) s/p TPA    Hyperlipidemia    Hypertension    Obstructive sleep apnea syndrome    1.  Stroke sx - S/P IV tPA, repeat HCT 24 hours post, MRI Brain, Echo.  Suspect small vessel  infarct.      I discussed the patients findings and my recommendations with patient, nursing staff and primary care team    Carlos Vasquez MD  10/11/19  8:45 AM

## 2019-10-11 NOTE — PROGRESS NOTES
Discharge Planning Assessment  [facility]     Patient Name: Vaughn Huynh  MRN: [id number]  Today's Date: 10/11/2019    Admit Date: 10/10/2019    Discharge Needs Assessment     Row Name 10/11/19 0835       Living Environment    Lives With  significant other    Name(s) of Who Lives With Patient  Remedios Hare    Current Living Arrangements  home/apartment/condo    Primary Care Provided by  self    Provides Primary Care For  no one    Family Caregiver if Needed  significant other    Family Caregiver Names  Remedios    Quality of Family Relationships  helpful;involved;supportive    Able to Return to Prior Arrangements  yes    Living Arrangement Comments  Pt lives with Remedios girl friend in 1 level home in [facility] [facility].       Resource/Environmental Concerns    Resource/Environmental Concerns  none       Transition Planning    Patient/Family Anticipates Transition to  home with family    Patient/Family Anticipated Services at Transition  none    Transportation Anticipated  family or friend will provide       Discharge Needs Assessment    Readmission Within the Last 30 Days  no previous admission in last 30 days    Concerns to be Addressed  no discharge needs identified    Equipment Currently Used at Home  none    Anticipated Changes Related to Illness  none    Equipment Needed After Discharge  none    Discharge Coordination/Progress  [employer] insurance  with Rx coverage.        Discharge Plan     Row Name 10/11/19 0838       Plan    Plan  IDP    Patient/Family in Agreement with Plan  yes    Plan Comments  Met with pt and girlfriend at . Pt is independent with ADL's and mobility. they live in 1 level home with basement in [facility] [facility]. Pt has not had HH or DME. Will awit PT eval to determine discharge plan. CM will follow as needed for discharge plan.    Final Discharge Disposition Code  01 - home or self-care        Destination      No service coordination in this encounter.      Durable Medical Equipment       No service coordination in this encounter.      Dialysis/Infusion      No service coordination in this encounter.      Home Medical Care      No service coordination in this encounter.      Therapy      No service coordination in this encounter.      Community Resources      No service coordination in this encounter.        Expected Discharge Date and Time     Expected Discharge Date Expected Discharge Time    Oct 14, 2019         Demographic Summary     Row Name 10/11/19 0833       General Information    Admission Type  inpatient    Arrived From  emergency department    Referral Source  admission list    Reason for Consult  discharge planning    Preferred Language  English       Contact Information    Permission Granted to Share Info With      Contact Information Comments  Sen Nichole PCP        Functional Status     Row Name 10/11/19 0835       Functional Status    Usual Activity Tolerance  excellent       Functional Status, IADL    Medications  independent    Meal Preparation  independent    Housekeeping  independent    Laundry  independent    Shopping  independent       Employment/    Employment/ Comments  [employer] with Rx coverage.        Psychosocial    No documentation.       Abuse/Neglect    No documentation.       Legal    No documentation.       Substance Abuse    No documentation.       Patient Forms    No documentation.           Radha Parrish RN

## 2019-10-11 NOTE — NURSING NOTE
Echo with bubble test completed. Speech therapist eval completed. Pt passed and diet ordered. MRI checklist faxed to Radiology

## 2019-10-11 NOTE — THERAPY EVALUATION
Patient Name: Vaughn Huynh  : 1965    MRN: [id number]                              Today's Date: 10/11/2019       Admit Date: 10/10/2019    Visit Dx:     ICD-10-CM ICD-9-CM   1. Cerebrovascular accident (CVA), unspecified mechanism (CMS/HCC) I63.9 434.91     Patient Active Problem List   Diagnosis   • Abnormal liver enzymes   • Excessive sweating   • Ganglion   • Heartburn   • Hyperlipidemia   • Hypertension   • Acute benign hemorrhagic glomerulonephritic syndrome   • Influenza due to influenza A virus   • Knee pain   • Myopathy   • Obstructive sleep apnea syndrome   • Osteoarthritis   • Postnasal drip   • Impaired glucose tolerance   • Nerve root disorder   • Shortness of breath   • Sore throat   • Parageusia   • Urinary symptom or sign   • Vitamin D deficiency   • Hematuria   • Elevated liver function tests   • Colon cancer screening   • Bilateral shoulder pain   • Eczema   • Need for vaccination   • Encounter for long-term current use of medication   • Nocturia   • Acute meniscal tear of right knee   • Rectus diastasis   • Suspected cerebrovascular accident (CVA) s/p TPA   • Cerebrovascular accident (CVA) (CMS/HCC)     Past Medical History:   Diagnosis Date   • Adult BMI 27.0-27.9 kg/sq m    • Cerebrovascular accident (CVA) (CMS/HCC) 10/11/2019   • Elevated cholesterol    • Esophagitis    • Heartburn    • Hyperlipidemia 2016   • Hypertension 2016   • Knee pain     right   • Myopathy 2016   • Osteoarthritis 2016    right knee     Past Surgical History:   Procedure Laterality Date   • BACK SURGERY       General Information     Row Name 10/11/19 1509          PT Evaluation Time/Intention    Document Type  evaluation  -KR     Mode of Treatment  physical therapy  -KR     Row Name 10/11/19 1509          General Information    Patient Profile Reviewed?  yes  -KR     Prior Level of Function  independent:;all household mobility;gait;transfer;ADL's;dressing;bathing  -KR     Existing  Precautions/Restrictions  fall  -KR     Barriers to Rehab  none identified  -KR     Row Name 10/11/19 1509          Relationship/Environment    Lives With  significant other  -KR     Row Name 10/11/19 1509          Resource/Environmental Concerns    Current Living Arrangements  home/apartment/condo  -KR     Row Name 10/11/19 1509          Home Main Entrance    Number of Stairs, Main Entrance  three  -KR     Stair Railings, Main Entrance  none  -KR     Row Name 10/11/19 1509          Stairs Within Home, Primary    Number of Stairs, Within Home, Primary  none  -KR     Row Name 10/11/19 1509          Cognitive Assessment/Intervention- PT/OT    Orientation Status (Cognition)  oriented x 4  -KR     Row Name 10/11/19 1509          Safety Issues, Functional Mobility    Safety Issues Affecting Function (Mobility)  safety precaution awareness;safety precautions follow-through/compliance  -KR     Impairments Affecting Function (Mobility)  balance;coordination;endurance/activity tolerance  -KR       User Key  (r) = Recorded By, (t) = Taken By, (c) = Cosigned By    Initials Name Provider Type    Mayelin Dunn PT Physical Therapist        Mobility     Row Name 10/11/19 1509          Bed Mobility Assessment/Treatment    Comment (Bed Mobility)  UIC  -KR     Row Name 10/11/19 1509          Transfer Assessment/Treatment    Comment (Transfers)  VC's for sequencing.   -KR     Row Name 10/11/19 1509          Sit-Stand Transfer    Sit-Stand [place] (Transfers)  supervision;verbal cues  -KR     Row Name 10/11/19 1509          Gait/Stairs Assessment/Training    Gait/Stairs Assessment/Training  gait/ambulation independence  -KR     [place] Level (Gait)  contact guard;verbal cues  -KR     Distance in Feet (Gait)  400  -KR     Pattern (Gait)  step-through  -KR     Deviations/Abnormal Patterns (Gait)  base of support, narrow;lisa decreased;stride length decreased  -KR     Bilateral Gait Deviations  heel strike decreased   -KR     Left Sided Gait Deviations  weight shift ability decreased  -KR     Comment (Gait/Stairs)  Pt demonstrated step through gait pattern with slow lisa. Periods of decreased weight bearing on L, contibuting to decreased step length on R. Pt demonstrated intermittent unsteadiness with lateral lean to the L; able to self correct.   -KR       User Key  (r) = Recorded By, (t) = Taken By, (c) = Cosigned By    Initials Name Provider Type    Mayelin Dunn, PT Physical Therapist        Obj/Interventions     Row Name 10/11/19 1511          General ROM    GENERAL ROM COMMENTS  BLE WFL   -KR     Row Name 10/11/19 1511          MMT (Manual Muscle Testing)    General MMT Comments  BLE grossly 4+/5  -KR     Row Name 10/11/19 1511          Static Sitting Balance    Level of [place] (Unsupported Sitting, Static Balance)  independent  -KR     Sitting Position (Unsupported Sitting, Static Balance)  sitting in chair  -KR     Row Name 10/11/19 1511          Static Standing Balance    Level of [place] (Supported Standing, Static Balance)  supervision  -KR     Row Name 10/11/19 1511          Dynamic Standing Balance    Level of [place], Reaches Outside Midline (Standing, Dynamic Balance)  contact guard assist  -KR     Row Name 10/11/19 1511          Sensory Assessment/Intervention    Sensory General Assessment  no sensation deficits identified  -KR       User Key  (r) = Recorded By, (t) = Taken By, (c) = Cosigned By    Initials Name Provider Type    Mayelin Dunn, PT Physical Therapist        Goals/Plan     Row Name 10/11/19 1512          Bed Mobility Goal 1 (PT)    Activity/Assistive Device (Bed Mobility Goal 1, PT)  bed mobility activities, all  -KR     [place] Level/Cues Needed (Bed Mobility Goal 1, PT)  independent  -KR     Time Frame (Bed Mobility Goal 1, PT)  2 weeks  -KR     Progress/Outcomes (Bed Mobility Goal 1, PT)  goal ongoing  -KR     Row Name 10/11/19 1512          Transfer Goal 1 (PT)     Activity/Assistive Device (Transfer Goal 1, PT)  sit-to-stand/stand-to-sit;bed-to-chair/chair-to-bed  -KR     [place] Level/Cues Needed (Transfer Goal 1, PT)  independent  -KR     Time Frame (Transfer Goal 1, PT)  2 weeks  -KR     Progress/Outcome (Transfer Goal 1, PT)  goal ongoing  -KR     Row Name 10/11/19 1512          Gait Training Goal 1 (PT)    Activity/Assistive Device (Gait Training Goal 1, PT)  gait (walking locomotion)  -KR     [place] Level (Gait Training Goal 1, PT)  independent  -KR     Distance (Gait Goal 1, PT)  400 feet  -KR     Time Frame (Gait Training Goal 1, PT)  2 weeks  -KR     Progress/Outcome (Gait Training Goal 1, PT)  goal ongoing  -KR     Row Name 10/11/19 1512          Stairs Goal 1 (PT)    Activity/Assistive Device (Stairs Goal 1, PT)  ascending stairs;descending stairs;step-to-step  -KR     [place] Level/Cues Needed (Stairs Goal 1, PT)  supervision required  -KR     Number of Stairs (Stairs Goal 1, PT)  3  -KR     Time Frame (Stairs Goal 1, PT)  2 weeks  -KR     Progress/Outcome (Stairs Goal 1, PT)  goal ongoing  -KR       User Key  (r) = Recorded By, (t) = Taken By, (c) = Cosigned By    Initials Name Provider Type    Mayelin Dunn, PT Physical Therapist        Clinical Impression     Row Name 10/11/19 1512          Pain Assessment    Additional Documentation  Pain Scale: Numbers Pre/Post-Treatment (Group)  -KR     Row Name 10/11/19 1512          Pain Scale: Numbers Pre/Post-Treatment    Pain Scale: Numbers, Pretreatment  0/10 - no pain  -KR     Pain Scale: Numbers, Post-Treatment  0/10 - no pain  -KR     Row Name 10/11/19 1512          Plan of Care Review    Plan of Care Reviewed With  patient  -KR     Row Name 10/11/19 1512          Physical Therapy Clinical Impression    Patient/Family Goals Statement (PT Clinical Impression)  return to PLOF  -KR     Criteria for Skilled Interventions Met (PT Clinical Impression)  yes;treatment indicated  -KR     Rehab  Potential (PT Clinical Summary)  good, to achieve stated therapy goals  -KR     Row Name 10/11/19 1512          Vital Signs    Pre Systolic BP Rehab  123  -KR     Pre Treatment Diastolic BP  83  -KR     Post Systolic BP Rehab  142  -KR     Post Treatment Diastolic BP  86  -KR     Pretreatment Heart Rate (beats/min)  87  -KR     Posttreatment Heart Rate (beats/min)  88  -KR     Pre SpO2 (%)  98  -KR     O2 Delivery Pre Treatment  room air  -KR     Post SpO2 (%)  97  -KR     O2 Delivery Post Treatment  room air  -KR     Pre Patient Position  Sitting  -KR     Intra Patient Position  Standing  -KR     Post Patient Position  Sitting  -KR     Row Name 10/11/19 1512          Positioning and Restraints    Pre-Treatment Position  sitting in chair/recliner  -KR     Post Treatment Position  chair  -KR     In Chair  notified nsg;reclined;call light within reach;encouraged to call for assist;exit alarm on;with family/caregiver;legs elevated  -KR       User Key  (r) = Recorded By, (t) = Taken By, (c) = Cosigned By    Initials Name Provider Type    Mayelin Dunn, PT Physical Therapist        Outcome Measures     Row Name 10/11/19 1515          How much help from another person do you currently need...    Turning from your back to your side while in flat bed without using bedrails?  4  -KR     Moving from lying on back to sitting on the side of a flat bed without bedrails?  4  -KR     Moving to and from a bed to a chair (including a wheelchair)?  3  -KR     Standing up from a chair using your arms (e.g., wheelchair, bedside chair)?  3  -KR     Climbing 3-5 steps with a railing?  3  -KR     To walk in hospital room?  3  -KR     AM-PAC 6 Clicks Score (PT)  20  -KR     Row Name 10/11/19 1515          Modified Piatt Scale    Pre-Stroke Modified Essence Scale  0 - No Symptoms at all.  -KR     Modified Essence Scale  1 - No significant disability despite symptoms.  Able to carry out all usual duties and activities.  -KR     Row Name  10/11/19 1515          Functional Assessment    Outcome Measure Options  AM-PAC 6 Clicks Basic Mobility (PT);Modified Essence  -KR       User Key  (r) = Recorded By, (t) = Taken By, (c) = Cosigned By    Initials Name Provider Type    Mayelin Dunn PT Physical Therapist        Physical Therapy Education     Title: PT OT SLP Therapies (In Progress)     Topic: Physical Therapy (In Progress)     Point: Mobility training (Done)     Learning Progress Summary           Patient Acceptance, E, DU,NR by AMENA at 10/11/2019 11:22 AM                   Point: Body mechanics (Done)     Learning Progress Summary           Patient Acceptance, E, DU,NR by AMENA at 10/11/2019 11:22 AM                   Point: Precautions (Done)     Learning Progress Summary           Patient Acceptance, E, DU,NR by AMENA at 10/11/2019 11:22 AM                               User Key     Initials Effective Dates Name Provider Type [facility]    AMENA 04/03/18 -  Mayelin Trejo PT Physical Therapist PT              PT Recommendation and Plan  Planned Therapy Interventions (PT Eval): balance training, bed mobility training, gait training, stair training, strengthening, transfer training  Outcome Summary/Treatment Plan (PT)  Anticipated Discharge Disposition (PT): home with assist  Plan of Care Reviewed With: patient  Outcome Summary: PT initial evaluation completed for pt presenting with mild balance deficits and decreased functional mobility. Pt ambulated 400ft with CGA. Pt's decreased independence warrants PT skilled care. Recommend D/C home with assistance.      Time Calculation:   PT Charges     Row Name 10/11/19 1122             Time Calculation    Start Time  1122  -KR      PT Received On  10/11/19  -AMENA      PT Goal Re-Cert Due Date  10/21/19  -AMENA        User Key  (r) = Recorded By, (t) = Taken By, (c) = Cosigned By    Initials Name Provider Type    Mayelin Dunn, PT Physical Therapist        Therapy Charges for Today     Code Description Service  Date Service Provider Modifiers Qty    71391482187 HC PT EVAL MOD COMPLEXITY 4 10/11/2019 Mayelin Trejo, PT GP 1          PT G-Codes  Outcome Measure Options: AM-PAC 6 Clicks Basic Mobility (PT), Modified Piatt  AM-PAC 6 Clicks Score (PT): 20  AM-PAC 6 Clicks Score (OT): 24  Modified Piatt Scale: 1 - No significant disability despite symptoms.  Able to carry out all usual duties and activities.    Becky Trejo, PT  10/11/2019

## 2019-10-11 NOTE — PLAN OF CARE
Problem: Patient Care Overview  Goal: Plan of Care Review  Outcome: Ongoing (interventions implemented as appropriate)   10/11/19 1122   Coping/Psychosocial   Plan of Care Reviewed With patient   OTHER   Outcome Summary PT initial evaluation completed for pt presenting with mild balance deficits and decreased functional mobility. Pt ambulated 400ft with CGA. Pt's decreased independence warrants PT skilled care. Recommend D/C home with assistance.        Problem: Stroke (Ischemic) (Adult)  Goal: Signs and Symptoms of Listed Potential Problems Will be Absent, Minimized or Managed (Stroke)  Outcome: Ongoing (interventions implemented as appropriate)   10/11/19 1122   Goal/Outcome Evaluation   Problems Assessed (Stroke (Ischemic)) cognitive impairment;communication impairment;motor/sensory impairment   Problems Assessed (Stroke (Ischemic)) motor/sensory impairment

## 2019-10-11 NOTE — PLAN OF CARE
Problem: Patient Care Overview  Goal: Plan of Care Review  Outcome: Ongoing (interventions implemented as appropriate)   10/11/19 1042   Coping/Psychosocial   Plan of Care Reviewed With patient;family   SLP evaluation completed. Will continue higher-level cognitive assessment at next session. Please see note for further details and recommendations.

## 2019-10-11 NOTE — THERAPY EVALUATION
Acute Care - Speech Language Pathology Initial Evaluation  [facility]     Patient Name: Vaughn Huynh  : 1965  MRN: [id number]  Today's Date: 10/11/2019               Admit Date: 10/10/2019     Visit Dx:    ICD-10-CM ICD-9-CM   1. Cerebrovascular accident (CVA), unspecified mechanism (CMS/HCC) I63.9 434.91     Patient Active Problem List   Diagnosis   • Abnormal liver enzymes   • Excessive sweating   • Ganglion   • Heartburn   • Hyperlipidemia   • Hypertension   • Acute benign hemorrhagic glomerulonephritic syndrome   • Influenza due to influenza A virus   • Knee pain   • Myopathy   • Obstructive sleep apnea syndrome   • Osteoarthritis   • Postnasal drip   • Impaired glucose tolerance   • Nerve root disorder   • Shortness of breath   • Sore throat   • Parageusia   • Urinary symptom or sign   • Vitamin D deficiency   • Hematuria   • Elevated liver function tests   • Colon cancer screening   • Bilateral shoulder pain   • Eczema   • Need for vaccination   • Encounter for long-term current use of medication   • Nocturia   • Acute meniscal tear of right knee   • Rectus diastasis   • Suspected cerebrovascular accident (CVA) s/p TPA   • Cerebrovascular accident (CVA) (CMS/HCC)     Past Medical History:   Diagnosis Date   • Adult BMI 27.0-27.9 kg/sq m    • Cerebrovascular accident (CVA) (CMS/HCC) 10/11/2019   • Elevated cholesterol    • Esophagitis    • Heartburn    • Hyperlipidemia 2016   • Hypertension 2016   • Knee pain     right   • Myopathy 2016   • Osteoarthritis 2016    right knee     Past Surgical History:   Procedure Laterality Date   • BACK SURGERY          SLP EVALUATION (last 72 hours)      SLP SLC Evaluation     Row Name 10/11/19 0930                   General Information    Prior Level of Function-Communication  WFL  -        Patient's Goals for Discharge  return to all previous roles/activities  -        Family Goals for Discharge  family did not state  -            Comprehension Assessment/Intervention    Comprehension Assessment/Intervention  Auditory Comprehension  -           Auditory Comprehension Assessment/Intervention    Answers Questions (Communication)  yes/no;wh questions;personal;simple;[facility]  -        Able to Follow Commands (Communication)  1-step;2-step;[facility]  -        Narrative Discourse  conversational level;[facility]  -           Expression Assessment/Intervention    Expression Assessment/Intervention  verbal expression  -           Verbal Expression Assessment/Intervention    Conversational Discourse/Fluency  L  -           Motor Speech Assessment/Intervention    Motor Speech Function  L  -           Cognitive Assessment Intervention- SLP    Orientation Status (Cognition)  person;place;time;situation;[facility]  -        Attention (Cognitive)  sustained;[facility]  -        Cognition, Comment  Testing/further evals pending. Will continue assessment of reading, writing, and higher level cognitive skills at next session. Pt in agreement,  -           SLP Clinical Impressions    SLP Diagnosis  Functional simple cog-comm skills. Will continue higher-level assessment at next session  -        SLC Criteria for Skilled Therapy Interventions Met  yes  -           Recommendations    Therapy Frequency (SLP SLC)  PRN;5 days per week  -          User Key  (r) = Recorded By, (t) = Taken By, (c) = Cosigned By    Initials Name Effective Dates     Cassy Christine MS CCC-SLP 06/22/15 -              EDUCATION  The patient has been educated in the following areas:     Cognitive Impairment Communication Impairment.    SLP Recommendation and Plan  SLP Diagnosis: Functional simple cog-comm skills. Will continue higher-level assessment at next session     Swallow Criteria for Skilled Therapeutic Interventions Met: no problems identified which require skilled intervention  SLC Criteria for Skilled Therapy Interventions Met: yes     Therapy Frequency (Swallow):  evaluation only       Plan of Care Reviewed With: patient, family                  Time Calculation:     Time Calculation- SLP     Row Name 10/11/19 1042             Time Calculation- SLP    SLP Start Time  09  -      SLP Received On  10/11/19  -        User Key  (r) = Recorded By, (t) = Taken By, (c) = Cosigned By    Initials Name Provider Type    Cassy Renteria, MS CCC-SLP Speech and Language Pathologist          Therapy Charges for Today     Code Description Service Date Service Provider Modifiers Qty    42070887221 HC ST EVAL ORAL PHARYNG SWALLOW 2 10/11/2019 Cassy Christine, MS CCC-SLP GN 1    74275158318 HC ST EVAL SPEECH AND PROD W LANG  2 10/11/2019 Cassy Christine MS CCC-SLP GN 1                     Cassy Christine MS CCC-SLP  10/11/2019 and Acute Care - Speech Language Pathology   Swallow Initial Evaluation [facility]     Patient Name: Vaughn Huynh  : 1965  MRN: [id number]  Today's Date: 10/11/2019               Admit Date: 10/10/2019    Visit Dx:     ICD-10-CM ICD-9-CM   1. Cerebrovascular accident (CVA), unspecified mechanism (CMS/HCC) I63.9 434.91     Patient Active Problem List   Diagnosis   • Abnormal liver enzymes   • Excessive sweating   • Ganglion   • Heartburn   • Hyperlipidemia   • Hypertension   • Acute benign hemorrhagic glomerulonephritic syndrome   • Influenza due to influenza A virus   • Knee pain   • Myopathy   • Obstructive sleep apnea syndrome   • Osteoarthritis   • Postnasal drip   • Impaired glucose tolerance   • Nerve root disorder   • Shortness of breath   • Sore throat   • Parageusia   • Urinary symptom or sign   • Vitamin D deficiency   • Hematuria   • Elevated liver function tests   • Colon cancer screening   • Bilateral shoulder pain   • Eczema   • Need for vaccination   • Encounter for long-term current use of medication   • Nocturia   • Acute meniscal tear of right knee   • Rectus diastasis   • Suspected cerebrovascular accident  (CVA) s/p TPA   • Cerebrovascular accident (CVA) (CMS/HCC)     Past Medical History:   Diagnosis Date   • Adult BMI 27.0-27.9 kg/sq m    • Cerebrovascular accident (CVA) (CMS/HCC) 10/11/2019   • Elevated cholesterol    • Esophagitis    • Heartburn    • Hyperlipidemia 7/18/2016   • Hypertension 7/18/2016   • Knee pain     right   • Myopathy 7/18/2016   • Osteoarthritis 07/18/2016    right knee     Past Surgical History:   Procedure Laterality Date   • BACK SURGERY  2012        SWALLOW EVALUATION (last 72 hours)      SLP Adult Swallow Evaluation     Row Name 10/11/19 0930                   Rehab Evaluation    Document Type  evaluation  -SM        Subjective Information  no complaints  -SM        Patient Observations  alert;cooperative  -SM        Patient/Family Observations  Family present  -           General Information    Patient Profile Reviewed  yes  -SM        Pertinent History Of Current Problem  L fronto-temporal infarct s/p TPA  -SM        Current Method of Nutrition  NPO  -SM        Precautions/Limitations, Vision  WFL  -SM        Precautions/Limitations, Hearing  WFL  -SM        Prior Level of Function-Communication  WFL  -SM        Prior Level of Function-Swallowing  safe, efficient swallowing in all situations  -        Plans/Goals Discussed with  patient and family;agreed upon  -        Barriers to Rehab  none identified  -        Patient's Goals for Discharge  return to PO diet  -        Family Goals for Discharge  family did not state  -           Pain Assessment    Additional Documentation  Pain Scale: Numbers Pre/Post-Treatment (Group)  -SM           Pain Scale: Numbers Pre/Post-Treatment    Pain Scale: Numbers, Pretreatment  0/10 - no pain  -SM        Pain Scale: Numbers, Post-Treatment  0/10 - no pain  -SM           Oral Musculature and Cranial Nerve Assessment    Oral Motor General Assessment  oral labial or buccal impairment  -SM        Oral Labial or Buccal Impairment, Detail,  Cranial Nerve VII (Facial):  left labial droop  -SM           Clinical Swallow Eval    Oral Prep Phase  WFL  -SM        Oral Transit  WFL  -SM        Oral Residue  WFL  -SM        Pharyngeal Phase  no overt signs/symptoms of pharyngeal impairment  -SM           Clinical Impression    SLP Swallowing Diagnosis  other (see comments) Swallow WFL  -SM        Swallow Criteria for Skilled Therapeutic Interventions Met  no problems identified which require skilled intervention  -           Recommendations    Therapy Frequency (Swallow)  evaluation only  -        SLP Diet Recommendation  regular textures;thin liquids  -          User Key  (r) = Recorded By, (t) = Taken By, (c) = Cosigned By    Initials Name Effective Dates    Cassy Renteria MS CCC-SLP 06/22/15 -           EDUCATION  The patient has been educated in the following areas:   Dysphagia (Swallowing Impairment).    SLP Recommendation and Plan  SLP Swallowing Diagnosis: other (see comments)(Swallow WFL)  SLP Diet Recommendation: regular textures, thin liquids                 Swallow Criteria for Skilled Therapeutic Interventions Met: no problems identified which require skilled intervention        Therapy Frequency (Swallow): evaluation only          Plan of Care Reviewed With: patient, family           Time Calculation:   Time Calculation- SLP     Row Name 10/11/19 1042             Time Calculation- SLP    SLP Start Time  0930  -      SLP Received On  10/11/19  -        User Key  (r) = Recorded By, (t) = Taken By, (c) = Cosigned By    Initials Name Provider Type    Cassy Renteria MS CCC-SLP Speech and Language Pathologist          Therapy Charges for Today     Code Description Service Date Service Provider Modifiers Qty    78723834273 HC ST EVAL ORAL PHARYNG SWALLOW 2 10/11/2019 Cassy Christine MS CCC-SLP GN 1    68339040988 HC ST EVAL SPEECH AND PROD W LANG  2 10/11/2019 Cassy Christine MS CCC-SLP GN 1                Cassy Christine, MS CCC-SLP  10/11/2019

## 2019-10-11 NOTE — ED PROVIDER NOTES
[facility] EMERGENCY DEPARTMENT    eMERGENCY dEPARTMENT eNCOUnter      Pt Name: Vaughn Huynh  MRN: [id number]  YOB: 1965  Date of evaluation: 10/10/2019  Provider: Delano Miranda DO    CHIEF COMPLAINT       Chief Complaint   Patient presents with   • Stroke         HISTORY OF PRESENT ILLNESS  (Location/Symptom, Timing/Onset, Context/Setting, Quality, Duration, Modifying Factors, Severity.)   Vaughn Huynh is a 53 y.o. male who presents to the emergency department with complaints of a stroke. His last known well was 2230 today. EMS personnel reports the patient began to experience double vision at 2030 today and ended up calling his wife. They had a normal conversation over the phone and she told him to check his blood sugar before hanging up. Shortly after, he called his wife again and was experiencing confusion and slurred speech over the phone. En route to the ED, patient was experiencing left-sided weakness, left facial droop, and an abnormal gait to the left side. He denies extremity numbness and tingling. His blood sugar was 167 en route. He does not have a past medical history of stroke. There are no other acute symptoms at this time.      Nursing notes were reviewed.    REVIEW OF SYSTEMS    (2-9 systems for level 4, 10 or more for level 5)   ROS:  General:  No fevers, no chills, no weakness  Cardiovascular:  No chest pain, no palpitations  Respiratory:  No shortness of breath, no cough, no wheezing  Gastrointestinal:  No pain, no nausea, no vomiting, no diarrhea  Musculoskeletal:  No muscle pain, no joint pain, + abnormal gait  Eyes: + double vision  Skin:  No rash, no easy bruising  Neurologic:  + slurred speech, no headache, no extremity numbness, no extremity tingling, + left-sided extremity weakness, + left facial droop, + confusion  Psychiatric:  No anxiety  Genitourinary:  No dysuria, no hematuria    Except as noted above the remainder of the review of systems  was reviewed and negative.       PAST MEDICAL HISTORY     Past Medical History:   Diagnosis Date   • Adult BMI 27.0-27.9 kg/sq m    • Esophagitis    • Heartburn    • Hyperlipidemia 7/18/2016   • Hypertension 7/18/2016   • Knee pain     right   • Myopathy 7/18/2016   • Osteoarthritis 07/18/2016    right knee         SURGICAL HISTORY       Past Surgical History:   Procedure Laterality Date   • BACK SURGERY  2012         CURRENT MEDICATIONS       Current Facility-Administered Medications:   •  alteplase (ACTIVASE) 100 mg kit, 0.81 mg/kg, Intravenous, Once, Delano Miranda DO, Last Rate: 79.9 mL/hr at 10/10/19 2228, 79.87 mg at 10/10/19 2228  •  [START ON 10/11/2019] aspirin tablet 325 mg, 325 mg, Oral, Daily **OR** [START ON 10/11/2019] aspirin suppository 300 mg, 300 mg, Rectal, Daily, Irina Mcadams APRN  •  dextrose (D50W) 25 g/ 50mL Intravenous Solution 25 g, 25 g, Intravenous, Q15 Min PRN, Irina Mcadams APRN  •  dextrose (GLUTOSE) oral gel 15 g, 15 g, Oral, Q15 Min PRN, Irina Mcadams APRN  •  glucagon (human recombinant) (GLUCAGEN DIAGNOSTIC) injection 1 mg, 1 mg, Subcutaneous, Q15 Min PRN, Irina Mcadams APRN  •  insulin lispro (humaLOG) injection 0-9 Units, 0-9 Units, Subcutaneous, 4x Daily With Meals & Nightly, Irina Mcadams APRN  •  niCARdipine (CARDENE) 25 mg/250 mL (0.1 mg/mL) 0.9% NS infusion, 5-15 mg/hr, Intravenous, Titrated, Irina Mcadams, APRN  •  sodium chloride 0.9 % flush 10 mL, 10 mL, Intravenous, PRN, Delano Miranda DO  •  sodium chloride 0.9 % flush 10 mL, 10 mL, Intravenous, Q12H, Irina Mcadams, APRN  •  sodium chloride 0.9 % flush 10 mL, 10 mL, Intravenous, PRN, Irina Mcadams, APRN  •  sodium chloride 0.9 % flush 10 mL, 10 mL, Intravenous, Q12H, Young, Irina L, APRN  •  sodium chloride 0.9 % flush 10 mL, 10 mL, Intravenous, PRN, Irina Mcadams, APRN  •  sodium chloride 0.9 % infusion, 100 mL/hr, Intravenous, Once, Delano Miranda, DO    Current Outpatient Medications:   •   aspirin 81 MG tablet, Take  by mouth daily., Disp: , Rfl:   •  esomeprazole (nexIUM) 40 MG capsule, Take 40 mg by mouth Every Morning Before Breakfast., Disp: , Rfl:   •  lisinopril-hydrochlorothiazide (PRINZIDE,ZESTORETIC) 20-12.5 MG per tablet, TAKE 1 TABLET TWICE A DAY, Disp: 180 tablet, Rfl: 2  •  METFORMIN HCL PO, Take 1 tablet by mouth 2 (Two) Times a Day., Disp: , Rfl:     ALLERGIES     Statins    FAMILY HISTORY       Family History   Problem Relation Age of Onset   • Cancer Mother         kindney   • Dementia Father    • Hypertension Father    • Diabetes Father    • Seizures Other    • Cancer Other    • Diabetes Other    • Hypertension Other    • Arthritis Other    • Hypertension Other    • Colon cancer Neg Hx    • Liver cancer Neg Hx    • Liver disease Neg Hx    • Esophageal cancer Neg Hx    • Stomach cancer Neg Hx           SOCIAL HISTORY       Social History     Socioeconomic History   • Marital status:      Spouse name: Not on file   • Number of children: Not on file   • Years of education: Not on file   • Highest education level: Not on file   Tobacco Use   • Smoking status: Former Smoker     Packs/day: 1.00     Types: Cigarettes     Start date: 1983     Last attempt to quit: 2006     Years since quittin.7   • Smokeless tobacco: Never Used   • Tobacco comment: quit x 10 years   Substance and Sexual Activity   • Alcohol use: Yes     Comment: VERY RARE    • Drug use: No   • Sexual activity: Defer         PHYSICAL EXAM    (up to 7 for level 4, 8 or more for level 5)     Vitals:    10/10/19 2217 10/10/19 2225 10/10/19 2227 10/10/19 2230   BP: 143/90 (!) 140/102 143/96 135/89   BP Location:       Patient Position:       Pulse: 92 94 89 94   Resp:       Temp:       TempSrc:       SpO2: 96% 96% 96% 95%   Weight:       Height:           Physical Exam  General :Patient is awake, alert, oriented, in no acute distress, nontoxic appearing  HEENT: Pupils are equally round and reactive to light,  EOMI, conjunctivae clear, sclerae white, there is no injection no icterus.  Oral mucosa is moist, no exudate. Uvula is midline. Peripheral vision intact.  Neck: Neck is supple, full range of motion, trachea midline  Cardiac: Heart regular rate, rhythm, no murmurs, rubs, or gallops  Lungs: Lungs are clear to auscultation, there is no wheezing, rhonchi, or rales. There is no use of accessory muscles.  Chest wall: There is no tenderness to palpation over the chest wall or over ribs  Abdomen: Abdomen is soft, nontender, nondistended. There is no firm or pulsatile masses, no rebound rigidity or guarding.   Musculoskeletal: 5 out of 5 strength in right sided extremities.  Mild weakness 4/5 of left upper extremity, no edema. Distal pulses intact.  Neuro: Motor intact, level of consciousness is normal, cerebellar function is normal, reflexes are grossly normal. GCS 15. Some dysarthria with mild slurred speech. Left lower labial facial droop. Left arm subtle weakness in  strength and left arm paresthesia compared to right. Moderate sensory loss on left lower leg compared to right. Both are strong.  Dermatology: Skin is warm and dry  Psych: Mentation is grossly normal, cognition is grossly normal. Affect is appropriate.      DIAGNOSTIC RESULTS     EKG: All EKG's are interpreted by the Emergency Department Physician who either signs or Co-signs this chart in the absence of a cardiologist.    ECG 12 Lead   Final Result   Test Reason : Acute Stroke Protocol (onset < 12 hrs)   Blood Pressure : **/** mmHG   Vent. Rate : 096 BPM     Atrial Rate : 096 BPM      P-R Int : 174 ms          QRS Dur : 104 ms       QT Int : 346 ms       P-R-T Axes : 032 -43 042 degrees      QTc Int : 437 ms      Normal sinus rhythm   Left axis deviation   Abnormal ECG   When compared with ECG of 13-MAR-2011 11:19,   No significant change was found   Confirmed by JORDI ALEMAN MD ([id number]) on 10/10/2019 10:54:17 PM      Referred By:  IJEOMA            Confirmed By:JORDI ALEMAN MD      ECG 12 Lead    (Results Pending)       RADIOLOGY:   Non-plain film images such as CT, Ultrasound and MRI are read by the radiologist. Plain radiographic images are visualized and preliminarily interpreted by the emergency physician with the below findings:      [] Radiologist's Report Reviewed:  XR Chest 1 View   Final Result   1. Negative chest.      Signer Name: Wil Fraga MD    Signed: 10/10/2019 11:06 PM    Workstation Name: [facility]      CT Cerebral Perfusion With & Without Contrast   Final Result   Normal brain perfusion CT.               Signer Name: Wil Fraga MD    Signed: 10/10/2019 10:33 PM    Workstation Name: [facility]      CT Head Without Contrast Stroke Protocol   Final Result   1. Suspected small cyst acute or subacute infarct in the head of the right caudate nucleus.   2. No evidence of intracranial hemorrhage. The remainder of the examination is negative.      NOTIFICATION: Critical Value/emergent results were discussed by telephone with the ordering physician, Dr. Jordi Aleman M.D., at 21:58 hours on 10/10/2019.      Signer Name: Erick Matthews MD    Signed: 10/10/2019 9:58 PM    Workstation Name: [facility]      CT Angiogram Head With & Without Contrast    (Results Pending)   CT Angiogram Neck With & Without Contrast    (Results Pending)   CT Head Without Contrast    (Results Pending)         ED BEDSIDE ULTRASOUND:   Performed by ED Physician - none    LABS:    I have reviewed and interpreted all of the currently available lab results from this visit (if applicable):  Results for orders placed or performed during the hospital encounter of 10/10/19   Troponin   Result Value Ref Range    Troponin T <0.010 0.000 - 0.030 ng/mL   aPTT   Result Value Ref Range    PTT 27.5 24.0 - 37.0 seconds   AST   Result Value Ref Range     AST (SGOT) 21 1 - 40 U/L   ALT   Result Value Ref Range    ALT (SGPT) 33 1 - 41 U/L   CBC Auto Differential   Result Value Ref Range    WBC 7.62 3.40 - 10.80 10*3/mm3    RBC 5.08 4.14 - 5.80 10*6/mm3    Hemoglobin 14.4 13.0 - 17.7 g/dL    Hematocrit 43.6 37.5 - 51.0 %    MCV 85.8 79.0 - 97.0 fL    MCH 28.3 26.6 - 33.0 pg    MCHC 33.0 31.5 - 35.7 g/dL    RDW 12.6 12.3 - 15.4 %    RDW-SD 39.5 37.0 - 54.0 fl    MPV 9.8 6.0 - 12.0 fL    Platelets 270 140 - 450 10*3/mm3    Neutrophil % 61.6 42.7 - 76.0 %    Lymphocyte % 26.2 19.6 - 45.3 %    Monocyte % 8.5 5.0 - 12.0 %    Eosinophil % 2.4 0.3 - 6.2 %    Basophil % 0.9 0.0 - 1.5 %    Immature Grans % 0.4 0.0 - 0.5 %    Neutrophils, Absolute 4.69 1.70 - 7.00 10*3/mm3    Lymphocytes, Absolute 2.00 0.70 - 3.10 10*3/mm3    Monocytes, Absolute 0.65 0.10 - 0.90 10*3/mm3    Eosinophils, Absolute 0.18 0.00 - 0.40 10*3/mm3    Basophils, Absolute 0.07 0.00 - 0.20 10*3/mm3    Immature Grans, Absolute 0.03 0.00 - 0.05 10*3/mm3    nRBC 0.0 0.0 - 0.2 /100 WBC   POC Surgery Labs   Result Value Ref Range    Ionized Calcium 1.23 1.20 - 1.32 mmol/L    Potassium 3.7 3.5 - 4.9 mmol/L    Sodium 135 (L) 138 - 146 mmol/L    Total CO2 28 24 - 29 mmol/L    Hemoglobin 14.3 12.0 - 17.0 g/dL    Hematocrit 42 38 - 51 %    pCO2, Arterial 46.5 (H) 35 - 45 mm Hg    pO2, Arterial 43 (L) 80 - 105 mmHg    Base Excess 2.0000 -5 - 5 mmol/L    O2 Saturation, Arterial 77 (L) 95 - 98 %    pH, Arterial 7.37 7.35 - 7.6 pH units    HCO3, Arterial 26.9 (H) 22 - 26 mmol/L   Light Blue Top   Result Value Ref Range    Extra Tube hold for add-on    Green Top (Gel)   Result Value Ref Range    Extra Tube Hold for add-ons.    Lavender Top   Result Value Ref Range    Extra Tube hold for add-on    Gold Top - SST   Result Value Ref Range    Extra Tube Hold for add-ons.         All other labs were within normal range or not returned as of this dictation.      EMERGENCY DEPARTMENT COURSE and DIFFERENTIAL DIAGNOSIS/MDM:    Vitals:    Vitals:    10/10/19 2217 10/10/19 2225 10/10/19 2227 10/10/19 2230   BP: 143/90 (!) 140/102 143/96 135/89   BP Location:       Patient Position:       Pulse: 92 94 89 94   Resp:       Temp:       TempSrc:       SpO2: 96% 96% 96% 95%   Weight:       Height:           ED Course as of Oct 10 2315   Thu Oct 10, 2019   2203 Dr. Miranda spoke with Dr. Vasquez, stroke intervention, who agrees with tPA.  [PK]   2204 NIH Stroke Scale/Score (NIHSS) from [email or web address]  on 10/10/2019  ** All calculations should be rechecked by clinician prior to use **    RESULT SUMMARY:  6 points  NIH Stroke Scale      INPUTS:  1A: Level of consciousness --> 0 = Alert; keenly responsive  1B: Ask month and age --> 0 = Both questions right  1C: 'Blink eyes' & 'squeeze hands' --> 0 = Performs both tasks  2: Horizontal extraocular movements --> 0 = Normal  3: Visual fields --> 0 = No visual loss  4: Facial palsy --> 2 = Partial paralysis (lower face)  5A: Left arm motor drift --> 1 = Drift, but doesn't hit bed  5B: Right arm motor drift --> 0 = No drift for 10 seconds  6A: Left leg motor drift --> 0 = No drift for 5 seconds  6B: Right leg motor drift --> 0 = No drift for 5 seconds  7: Limb Ataxia --> 0 = No ataxia  8: Sensation --> 1 = Mild-moderate loss: less sharp/more dull   9: Language/aphasia --> 1 = Mild-moderate aphasia: some obvious changes, without significant limitation  10: Dysarthria --> 1 = Mild-moderate dysarthria: slurring but can be understood  11: Extinction/inattention --> 0 = No abnormality    [AP]   2204 tPA Contraindications for Ischemic Stroke from [facility]  on 10/10/2019  ** All calculations should be rechecked by clinician prior to use **    RESULT SUMMARY:       Patient eligible for tPA.      INPUTS:  Age =18 --> 1 = Yes  Clinical diagnosis of ischemic stroke causing neurological deficit --> 1 = Yes  Time of symptom onset  --> 1 = Yes  Intracranial hemorrhage on CT --> 0 = No  Clinical presentation suggests  subarachnoid hemorrhage --> 0 = No  Neurosurgery, head trauma, or stroke in past 3 months --> 0 = No  Uncontrolled hypertension (>185 mmHg SBP or >110 mmHg DBP) --> 0 = No  History of intracranial hemorrhage --> 0 = No  Known intracranial arteriovenous malformation, neoplasm, or aneurysm --> 0 = No  Active internal bleeding --> 0 = No  Suspected/confirmed endocarditis --> 0 = No  Known bleeding diathesis --> 0 = No  Abnormal blood glucose ( --> 0 = No  Only minor or rapidly improving stroke symptoms --> 0 = No  Major surgery or serious non-head trauma in the previous 14 days --> 0 = No  History of gastrointestinal or urinary tract hemorrhage within 21 days --> 0 = No  Seizure at stroke onset --> 0 = No  Recent arterial puncture at a noncompressible site --> 0 = No  Recent lumbar puncture --> 0 = No  Post myocardial infarction pericarditis --> 0 = No  Pregnancy --> 0 = No  Age >80 years --> 0 = No  History of prior stroke and diabetes --> 0 = No  Any active anticoagulant use (even with INR  --> 0 = No  NIHSS >25 --> 0 = No  CT shows multilobar infarction (hypodensity >1/3 cerebral hemisphere) --> 0 = No    [AP]      ED Course User Index  [AP] Delano Miranda, DO  [PK] Fernando Horner   !    Patient presents with acute change in neurological status at 8:30 PM, presents from [facility] as a stroke, code 19, was brought immediately back to CT scanner, stroke protocol initiated, patient does have initial NIH of 6 with left-sided arm weakness, paresthesia some sensory deficit of the left leg, also difficulty with ambulation per the medics prior to arrival.  Also has expressive aphasia and left facial droop.  Initial CT head without contrast with no acute bleed, small focal area in the right caudate nucleus.  Case was discussed with neurology, radiology, CT perfusion study and CT angios of the head and neck were obtained.  We discussed thrombolysis therapy, neurology agrees the patient is a candidate as he is  only approximately 2 hours in from the initial onset of his symptoms.  I had a discussion with the patient and the wife at the bedside regarding the possibility of TPA, going through inclusion and exclusion criteria, he does meet the inclusion for TPA, we discussed the risks and benefit of administering TPA including catastrophic bleeding, worsening of symptoms, even death.  Family understands the risks and also the benefits and they would like to proceed with TPA administration.  Please see nursing notes for timings of initiation of TPA.  Case was discussed with intensivist, Dr. Bradley, regarding admission.  Patient will be monitored closely frequent neuro checks, admitted to the intensive care unit.  Patient remains alert, protecting his airway, blood pressure stable.      MEDICATIONS ADMINISTERED IN ED:  Medications   sodium chloride 0.9 % flush 10 mL (not administered)   alteplase (ACTIVASE) 100 mg kit (79.87 mg Intravenous New Bag 10/10/19 2228)   sodium chloride 0.9 % infusion (not administered)   sodium chloride 0.9 % flush 10 mL (not administered)   sodium chloride 0.9 % flush 10 mL (not administered)   sodium chloride 0.9 % flush 10 mL (not administered)   sodium chloride 0.9 % flush 10 mL (not administered)   aspirin tablet 325 mg (not administered)     Or   aspirin suppository 300 mg (not administered)   niCARdipine (CARDENE) 25 mg/250 mL (0.1 mg/mL) 0.9% NS infusion (not administered)   dextrose (GLUTOSE) oral gel 15 g (not administered)   dextrose (D50W) 25 g/ 50mL Intravenous Solution 25 g (not administered)   glucagon (human recombinant) (GLUCAGEN DIAGNOSTIC) injection 1 mg (not administered)   insulin lispro (humaLOG) injection 0-9 Units (not administered)   iopamidol (ISOVUE-370) 76 % injection 150 mL (115 mL Intravenous Given 10/10/19 2204)   alteplase (ACTIVASE) bolus from vial (8.87 mg Intravenous Given 10/10/19 2227)       PROCEDURES:  Procedures   TPA Procedure Note:  - Stroke Thrombolysis with  IV Infusion of thrombolytic  - Performed by ED Physician: Yes  - Criteria meet: CT head reviewed and no intracranial hemorrhage identified. Measurable neurologic deficit. Patient is greater than 18 years of age.  Symptom onset is less than 4.5 hours.  Currently available studies have been reviewed.  - Exclusion Criteria: none  - Risk discussed: Bleeding, worsening condition, death/permanent disability.  - Consent Obtained: YES, by patient and wife at bedside  - Monitoring: Cardiac, blood pressure, continuous pulse oximetry.  See nurses notes.  - Confirmed: Patient and procedure are correct  - Infusion start time: 2206  - Thrombolytic Dose: TPA 0.9 mg/kg (PRN adjustment per pharmacy).  10% bolus with remainder infused over 1 hour.  See nursing notes.        CRITICAL CARE TIME    Total Critical Care time was 60 minutes, excluding separately reportable procedures.  Acute CVA requiring multiple rechecks, interventions, administration of TPA and multiple specialist consultations and family discussions.  There was a high probability of clinically significant/life threatening deterioration in the patient's condition which required my urgent intervention.      FINAL IMPRESSION      1. Cerebrovascular accident (CVA), unspecified mechanism (CMS/[facility])          DISPOSITION/PLAN     ED Disposition     ED Disposition Condition Comment    Decision to Admit  Level of Care: Critical Care [6]   Admitting Physician: CAMRON ARMAS [[id number]]            PATIENT REFERRED TO:  No follow-up provider specified.    DISCHARGE MEDICATIONS:     Medication List      ASK your doctor about these medications    aspirin 81 MG tablet     esomeprazole 40 MG capsule  Commonly known as:  nexIUM     lisinopril-hydrochlorothiazide 20-12.5 MG per tablet  Commonly known as:  PRINZIDE,ZESTORETIC  TAKE 1 TABLET TWICE A DAY     METFORMIN HCL PO            Documentation assistance provided by Fernando Horner acting as scribe for Dr. Delano Miranda.     The  brendon's documentation has been prepared under my direction and personally reviewed by me in its entirety.  I confirm that the note above accurately reflects all work, treatment, procedures, and medical decision making performed by me.      Comment: Please note this report has been produced using speech recognition software.      Delano Miranda DO  Attending Emergency Physician               Fernando Horner  10/10/19 2213       Delano Miranda DO  10/10/19 1833       Delano Miranda,   10/10/19 [id number]

## 2019-10-11 NOTE — PLAN OF CARE
Problem: Patient Care Overview  Goal: Plan of Care Review  Outcome: Ongoing (interventions implemented as appropriate)   10/11/19 1047   Coping/Psychosocial   Plan of Care Reviewed With patient   Plan of Care Review   Progress improving   OTHER   Outcome Summary OT completed a brief chart review. Pt is independent w/ ADLs and mobility. Pt has no further deficits which require skilled IPOT intervention, will sign off. Recommend pt DC home w/ assist.        Problem: Stroke (Ischemic) (Adult)  Goal: Signs and Symptoms of Listed Potential Problems Will be Absent, Minimized or Managed (Stroke)   10/11/19 1156   Goal/Outcome Evaluation   Problems Assessed (Stroke (Ischemic)) cognitive impairment;communication impairment;motor/sensory impairment   Problems Assessed (Stroke (Ischemic)) none

## 2019-10-11 NOTE — PROGRESS NOTES
"Adult Nutrition  Assessment/PES    Patient Name:  Vaughn Huynh  YOB: 1965  MRN: [id number]  Admit Date:  10/10/2019    Assessment Date:  10/11/2019    Comments:  Education provided and referral entered for out-pt svc.    Reason for Assessment     Row Name 10/11/19 1448          Reason for Assessment    Reason For Assessment  other (see comments) Education request from pt/family 25 min     Diagnosis  other (see comments) Pt s/p CVA w HTN, HLP DM [place] hx GERD, esophagitis, glomeronephrotic syndrome         Nutrition/Diet History     Row Name 10/11/19 1450          Nutrition/Diet History    Factors Affecting Nutritional Intake  other (see comments) family rpt pt tries to follow diet but loves sweets and sometimes indulges. Would like more in-depth ed Allows doing fine w food in hospital         Anthropometrics        Row Name 10/11/19 1212 10/11/19 1021       Anthropometrics    Height  188 cm (74\")  188 cm (74\")    Weight  95.7 kg (211 lb)  95.7 kg (211 lb)       Ideal Body Weight (IBW)    Ideal Body Weight (IBW) (kg)  87.66  87.66    % Ideal Body Weight  109.19  109.19       Body Mass Index (BMI)    BMI (kg/m2)  27.15  27.15        Labs/Tests/Procedures/Meds     Row Name 10/11/19 1452          Labs/Procedures/Meds    Lab Results Reviewed  reviewed             Nutrition Prescription Ordered     Row Name 10/11/19 1452          Nutrition Prescription PO    Current PO Diet  Regular     Common Modifiers  Cardiac;Consistent Carbohydrate;Low Fat         Evaluation of Received Nutrient/Fluid Intake     Row Name 10/11/19 1452          PO Evaluation    Number of Days PO Intake Evaluated  Insufficient Data               Problem/Interventions:  Problem 1     Row Name 10/11/19 1453          Nutrition Diagnoses Problem 1    Problem 1  Knowledge Deficit     Etiology (related to)  Medical Diagnosis     Cardiac  HTN;Hypercholesterolemia;Hypertriglyceridemia     Endocrine  DM     Signs/Symptoms (evidenced by)  " Reported  Information Deficit     Resolved?  Yes                 Intervention Goal     Row Name 10/11/19 1454          Intervention Goal    General  Provide information regarding MNT for treatment/condition             Education/Evaluation     Row Name 10/11/19 1454          Education    Education  Provided education regarding;Education topics;Advised regarding habits/behavior     Provided education regarding  Diet rationale;Key food habit change     Education Topics  Diabetes;Triglyceride;Na+     Advised Regarding Habits/Behavior  Seasoning food;Food choices;Eating pattern        Monitor/Evaluation    Education Follow-up  Other (comment) Family w good attn to materials for diet for DM HTN HLP GERD. Requested out-pt svc for more in-depth. Referral entered.            Electronically signed by:  Lisa Michel RD  10/11/19 3:00 PM

## 2019-10-11 NOTE — PLAN OF CARE
Problem: Patient Care Overview  Goal: Plan of Care Review  Outcome: Ongoing (interventions implemented as appropriate)   10/11/19 5265   Coping/Psychosocial   Plan of Care Reviewed With patient;significant other;family   Plan of Care Review   Progress improving   OTHER   Outcome Summary NIH 2. Echo, and MRI completed. Passed swallow evl with SLP. Tolerated PO intake. Continent, Up to BR. Followup CT scan pending for 2300. VSS and afebrile     Goal: Interprofessional Rounds/Family Conf  Outcome: Ongoing (interventions implemented as appropriate)

## 2019-10-12 LAB
GLUCOSE BLDC GLUCOMTR-MCNC: 142 MG/DL (ref 70–130)
GLUCOSE BLDC GLUCOMTR-MCNC: 155 MG/DL (ref 70–130)
GLUCOSE BLDC GLUCOMTR-MCNC: 164 MG/DL (ref 70–130)
GLUCOSE BLDC GLUCOMTR-MCNC: 187 MG/DL (ref 70–130)
GLUCOSE BLDC GLUCOMTR-MCNC: 195 MG/DL (ref 70–130)

## 2019-10-12 PROCEDURE — 99232 SBSQ HOSP IP/OBS MODERATE 35: CPT | Performed by: INTERNAL MEDICINE

## 2019-10-12 PROCEDURE — 99233 SBSQ HOSP IP/OBS HIGH 50: CPT | Performed by: PSYCHIATRY & NEUROLOGY

## 2019-10-12 PROCEDURE — 82962 GLUCOSE BLOOD TEST: CPT

## 2019-10-12 RX ORDER — ASPIRIN 325 MG
162 TABLET ORAL DAILY
Status: DISCONTINUED | OUTPATIENT
Start: 2019-10-13 | End: 2019-10-15 | Stop reason: HOSPADM

## 2019-10-12 RX ORDER — ROSUVASTATIN CALCIUM 20 MG/1
20 TABLET, COATED ORAL NIGHTLY
Status: DISCONTINUED | OUTPATIENT
Start: 2019-10-12 | End: 2019-10-12

## 2019-10-12 RX ORDER — PANTOPRAZOLE SODIUM 40 MG/1
40 TABLET, DELAYED RELEASE ORAL
Status: DISCONTINUED | OUTPATIENT
Start: 2019-10-12 | End: 2019-10-15 | Stop reason: HOSPADM

## 2019-10-12 RX ORDER — ASPIRIN 300 MG/1
300 SUPPOSITORY RECTAL DAILY
Status: DISCONTINUED | OUTPATIENT
Start: 2019-10-13 | End: 2019-10-15 | Stop reason: HOSPADM

## 2019-10-12 RX ADMIN — ASPIRIN 325 MG ORAL TABLET 325 MG: 325 PILL ORAL at 00:04

## 2019-10-12 RX ADMIN — LISINOPRIL: 20 TABLET ORAL at 20:29

## 2019-10-12 RX ADMIN — SODIUM CHLORIDE, PRESERVATIVE FREE 10 ML: 5 INJECTION INTRAVENOUS at 20:29

## 2019-10-12 RX ADMIN — PANTOPRAZOLE SODIUM 40 MG: 40 TABLET, DELAYED RELEASE ORAL at 11:55

## 2019-10-12 RX ADMIN — APIXABAN 5 MG: 5 TABLET, FILM COATED ORAL at 20:24

## 2019-10-12 RX ADMIN — LISINOPRIL: 20 TABLET ORAL at 11:54

## 2019-10-12 RX ADMIN — SODIUM CHLORIDE, PRESERVATIVE FREE 10 ML: 5 INJECTION INTRAVENOUS at 11:56

## 2019-10-12 NOTE — CONSULTS
[facility] Consult Note       Referring Provider: No ref. provider found  Reason for Consultation:      Patient Care Team:  Sen Nichole MD as PCP - General (Internal Medicine)    Chief complaint: Stroke    Subjective .     History of present illness: 53-year-old healthy male with hypertension diabetes and a history of hyperlipidemia who was admitted with slurred speech and left-sided weakness on Thursday evening.  The patient was treated with TPA and his symptoms resolved.  Imaging ultimately revealed a small right thalamic infarct.  He has been stable on antiplatelet therapy since admission with near complete resolution of his presenting symptoms.  Echocardiography revealed a right to left positive saline bubble study.  LV function was normal.  CT angiogram of the neck vessels was unremarkable.  He has no prior history of stroke TIA congestive heart failure or myocardial infarction.  He has no history of atrial arrhythmias.  He denies tachypalpitations.    Review of Systems  A 14 point review of systems was negative except as was stated in the HPI    History  Past Medical History:   Diagnosis Date   • Adult BMI 27.0-27.9 kg/sq m    • Cerebrovascular accident (CVA) (CMS/[facility]) 10/11/2019   • Elevated cholesterol    • Esophagitis    • Heartburn    • Hyperlipidemia 7/18/2016   • Hypertension 7/18/2016   • Knee pain     right   • Myopathy 7/18/2016   • Osteoarthritis 07/18/2016    right knee     Past Surgical History:   Procedure Laterality Date   • BACK SURGERY  2012     Family History   Problem Relation Age of Onset   • Cancer Mother         kindney   • Dementia Father    • Hypertension Father    • Diabetes Father    • Seizures Other    • Cancer Other    • Diabetes Other    • Hypertension Other    • Arthritis Other    • Hypertension Other    • Colon cancer Neg Hx    • Liver cancer Neg Hx    • Liver disease Neg Hx    • Esophageal cancer Neg Hx    • Stomach cancer Neg Hx      Social History  "    Tobacco Use   • Smoking status: Former Smoker     Packs/day: 1.00     Types: Cigarettes     Start date: 1983     Last attempt to quit: 2006     Years since quittin.7   • Smokeless tobacco: Never Used   • Tobacco comment: quit x 10 years   Substance Use Topics   • Alcohol use: Yes     Comment: VERY RARE    • Drug use: No     Medications Prior to Admission   Medication Sig Dispense Refill Last Dose   • aspirin 81 MG tablet Take  by mouth daily.   Taking   • esomeprazole (nexIUM) 40 MG capsule Take 40 mg by mouth Every Morning Before Breakfast.      • lisinopril-hydrochlorothiazide (PRINZIDE,ZESTORETIC) 20-12.5 MG per tablet TAKE 1 TABLET TWICE A  tablet 2 Taking   • METFORMIN HCL PO Take 1 tablet by mouth 2 (Two) Times a Day.        Scheduled Meds:    aspirin 325 mg Oral Daily   Or      aspirin 300 mg Rectal Daily   insulin lispro 0-9 Units Subcutaneous 4x Daily AC & at Bedtime   lisinopril-hydroCHLOROthiazide (ZESTORETIC) 20-12.5 mg combo dose  Oral Q12H   pantoprazole 40 mg Oral Q AM   rosuvastatin 20 mg Oral Nightly   sodium chloride 10 mL Intravenous Q12H     Continuous Infusions:     PRN Meds:  sodium chloride   Allergies:  Statins    Objective     Vital Sign Min/Max for last 24 hours  Temp  Min: 97.8 °F (36.6 °C)  Max: 98.7 °F (37.1 °C)   BP  Min: 103/64  Max: 136/92   Pulse  Min: 67  Max: 89   Resp  Min: 16  Max: 20   SpO2  Min: 87 %  Max: 98 %   No Data Recorded   Weight  Min: 95.7 kg (210 lb 15.7 oz)  Max: 95.7 kg (211 lb)     Flowsheet Rows      First Filed Value   Admission Height  182.9 cm (72\") Documented at 10/10/2019 2158   Admission Weight  98.6 kg (217 lb 6 oz) Documented at 10/10/2019 2155               Physical Exam:  General Appearance: Alert, appears stated age and cooperative  Lungs: Clear to auscultation  Heart:: RRR  No Murmurs, Rubs or Gallops  Abdomen: Soft and nontender with adequate bowel sounds.  No organomegaly  Extremities: No cyanosis, clubbing or edema  Pulses: " "Pulses palpable and equal bilaterally  Skin: Warm and dry with no rash  Psych: Normal  Results Review:         AIS    Hyperlipidemia    Hypertension    Obstructive sleep apnea syndrome    Suspected cerebrovascular accident (CVA) s/p TPA    PFO (patent foramen ovale)              Impression      Acute CVA with a small right thalamic infarct by CT status post TPA therapy with resolution of symptoms  Hypertension  Diabetes  Remote smoker  Hyperlipidemia and statin intolerance  Abnormal echo with presumed PFO and right-to-left shunt by saline study on transthoracic echo          Plan     This strikes me as possibly more consistent with cryptogenic stroke than other patients I have seen recently.  CT angiography of his neck and head vessels was unremarkable.    I think he needs transesophageal echocardiography.  In addition I think he warrants outpatient surveillance for atrial arrhythmias.  I would also recommend a sleep study to exclude obstructive sleep apnea.  He has been intolerant of statins in the past and has chronic persistent myalgias and \"charley horses\" that would make a assessment of statin intolerance even more difficult.  Therefore I think he should be evaluated for PSK 9 inhibitor like Repatha.  I think I would favor treating him with lower dose aspirin and a NOAC until we have more information.        I discussed the patients findings and my recommendations with patient and family    Sen Ibarra MD  10/12/19  1:50 PM    Time:       "

## 2019-10-12 NOTE — PLAN OF CARE
Problem: Patient Care Overview  Goal: Plan of Care Review  Outcome: Ongoing (interventions implemented as appropriate)   10/12/19 0644   Coping/Psychosocial   Plan of Care Reviewed With patient;significant other   Plan of Care Review   Progress improving   OTHER   Outcome Summary NIH 1, VSS, Up with 1, no complaints, will continue to monitor       Problem: Stroke (Ischemic) (Adult)  Goal: Signs and Symptoms of Listed Potential Problems Will be Absent, Minimized or Managed (Stroke)  Outcome: Ongoing (interventions implemented as appropriate)   10/12/19 0644   Goal/Outcome Evaluation   Problems Assessed (Stroke (Ischemic)) all   Problems Assessed (Stroke (Ischemic)) none       Problem: Thrombolytic Therapy (Adult)  Goal: Signs and Symptoms of Listed Potential Problems Will be Absent, Minimized or Managed (Thrombolytic Therapy)  Outcome: Ongoing (interventions implemented as appropriate)   10/12/19 0644   Goal/Outcome Evaluation   Problems Assessed (Thrombolytic Therapy) all   Problems Assessed (Thrombolytic Therapy) none

## 2019-10-12 NOTE — PROGRESS NOTES
Patient is on Apixaban 5mg BID.  Education provided on 1012/19 verbally and in writing.  Discussed effects of medication, drug-drug and drug-food interactions, and signs/symptoms of bleeding and clotting.  Patient verbalized understanding through teach back.  All pertinent questions were answered.      Dre HarrisonD  Pharmacy Resident   10/12/2019  5:50 PM

## 2019-10-12 NOTE — PROGRESS NOTES
INTENSIVIST   PROGRESS NOTE     Hospital:  LOS: 2 days      S     Mr. Vaguhn Huynh, 53 y.o. male is followed for:      AIS s/p TPA    PFO (patent foramen ovale)    DM    As an Intensivist, we provide an integrated approach to the ICU patient and family, medical management of comorbid conditions, lead interdisciplinary rounds and coordinate the care with all other services, including those from other specialists.     Interval History:  Doing great.  Walking without difficulty.  CT post tPA, last night, no bleeding.  NIHSS = 1 today.     The patient's relevant past medical, surgical and social history were reviewed and updated in Epic as appropriate.     ROS:   Constitutional: Negative for fever.   Respiratory: Negative for dyspnea.   Cardiovascular: Negative for chest pain.   Gastrointestinal: Negative for  nausea, vomiting and diarrhea.        O     Vitals:  Temp: 97.8 °F (36.6 °C) (10/12/19 0600) Temp  Min: 97.8 °F (36.6 °C)  Max: 98.8 °F (37.1 °C)   Temp core:      BP: 126/78 (10/12/19 0800) BP  Min: 103/64  Max: 142/86   Pulse: 70 (10/12/19 0800) Pulse  Min: 67  Max: 89   Resp: 18 (10/12/19 0600) Resp  Min: 16  Max: 20   SpO2: 96 % (10/12/19 0800) SpO2  Min: 87 %  Max: 98 %   Device: room air (10/12/19 0600)    Flow Rate:   No Data Recorded     Intake/Ouptut 24 hrs (7:00AM - 6:59 AM)  Intake/Output       10/11/19 0700 - 10/12/19 0659    Intake (ml) 1200    Output (ml) 1650    Net (ml) -450    Last Weight  95.7 kg (210 lb 15.7 oz)           Physical Examination  Telemetry:  Rhythm: normal sinus rhythm (10/12/19 0600)         Constitutional:  No acute distress.   Cardiovascular: RRR.   Normal heart sounds.  No murmurs, gallop or rub.   Respiratory: Normal breath sounds  No adventitious sounds.   Abdominal:  Soft with no tenderness.  No distension.   No HSM.   Extremities: Warm.  Dry.  No cyanosis.  No Edema   Neurological:   Alert, Oriented, Cooperative.  Best Eye Response: 4-->(E4) spontaneous (10/12/19  0600)  Best Motor Response: 6-->(M6) obeys commands (10/12/19 0600)  Best Verbal Response: 5-->(V5) oriented (10/12/19 0600)  [place] Coma Scale Score: 15 (10/12/19 0600)   Lines/Drains/Airways: Peripheral IV(s)     NIH Stroke Scale  Interval: (return from CT) (10/11/19 2305)  1a. Level of Consciousness: 0-->Alert, keenly responsive (10/12/19 0700)  1b. LOC Questions: 0-->Answers both questions correctly (10/12/19 0700)  1c. LOC Commands: 0-->Performs both tasks correctly (10/12/19 0700)  2. Best Gaze: 0-->Normal (10/12/19 0700)  3. Visual: 0-->No visual loss (10/12/19 0700)  4. Facial Palsy: 1-->Minor paralysis (flattened nasolabial fold, asymmetry on smiling) (10/12/19 0700)  5a. Motor Arm, Left: 0-->No drift, limb holds 90 (or 45) degrees for full 10 secs (10/12/19 0700)  5b. Motor Arm, Right: 0-->No drift, limb holds 90 (or 45) degrees for full 10 secs (10/12/19 0700)  6a. Motor Leg, Left: 0-->No drift, leg holds 30 degree position for full 5 secs (10/12/19 0700)  6b. Motor Leg, Right: 0-->No drift, leg holds 30 degree position for full 5 secs (10/12/19 0700)  7. Limb Ataxia: 0-->Absent (10/12/19 0700)  8. Sensory: 0-->Normal, no sensory loss (10/12/19 0700)  9. Best Language: 0-->No aphasia, normal (10/12/19 0700)  10. Dysarthria: 0-->Normal (10/12/19 0700)  11. Extinction and Inattention (formerly Neglect): 0-->No abnormality (10/12/19 0700)  Total (NIH Stroke Scale): 1 (10/12/19 0700)    Hematology:  Results from last 7 days   Lab Units 10/11/19  0456 10/10/19  2201 10/10/19  2159   WBC 10*3/mm3 6.61 7.62  --    HEMOGLOBIN g/dL 13.7 14.4  --    HEMOGLOBIN, POC g/dL  --   --  14.3   MCV fL 85.5 85.8  --    PLATELETS 10*3/mm3 247 270  --        Chemistry:  Estimated Creatinine Clearance: 177.9 mL/min (A) (by C-G formula based on SCr of 0.65 mg/dL (L)).    Results from last 7 days   Lab Units 10/11/19  0456   SODIUM mmol/L 135*   POTASSIUM mmol/L 3.9   CHLORIDE mmol/L 100   CO2 mmol/L 27.0   ANION GAP mmol/L 8.0    GLUCOSE mg/dL 148*   CALCIUM mg/dL 8.7     Results from last 7 days   Lab Units 10/11/19  0456   BUN mg/dL 16   CREATININE mg/dL 0.65*       Images:  Ct Angiogram Head With & Without Contrast    Result Date: 10/11/2019  1. By NASCET criteria 0% stenosis at either carotid bifurcation. 2. Both vertebral arteries are patent with left mildly dominant. 3. No intracranial vascular cut off. No focal central stenosis. Please note: Despite several attempts, the sagittal and coronal 3-D reformatted imaging through the neck cannot be transmitted for review. If these images become available, I will addend this report. Signer Name: Naomi Daly MD  Signed: 10/11/2019 10:39 AM  Workstation Name: [email or web address]  [facility]    Ct Head Without Contrast    Result Date: 10/11/2019  No acute intracranial findings. No significant change from prior study. Signer Name: Carlos Giron MD  Signed: 10/11/2019 11:54 PM  Workstation Name: [id number]  [facility]    Ct Angiogram Neck With & Without Contrast    Result Date: 10/11/2019  1. By NASCET criteria 0% stenosis at either carotid bifurcation. 2. Both vertebral arteries are patent with left mildly dominant. 3. No intracranial vascular cut off. No focal central stenosis. Please note: Despite several attempts, the sagittal and coronal 3-D reformatted imaging through the neck cannot be transmitted for review. If these images become available, I will addend this report. Signer Name: Naomi Daly MD  Signed: 10/11/2019 10:39 AM  Workstation Name: [email or web address]  [facility]    Mri Brain With & Without Contrast    Result Date: 10/11/2019  1 cm acute infarct in the right thalamic region. There is no evidence of hemorrhage.  D:  10/11/2019 E:  10/11/2019     This report was finalized on 10/11/2019 5:40 PM by Dr. Raj Dillard MD.      Xr Chest 1 View    Result Date: 10/10/2019  1. Negative chest. Signer Name: Wil Fraga MD  Signed:  10/10/2019 11:06 PM  Workstation Name: [facility]    Ct Head Without Contrast Stroke Protocol    Result Date: 10/10/2019  1. Suspected small cyst acute or subacute infarct in the head of the right caudate nucleus. 2. No evidence of intracranial hemorrhage. The remainder of the examination is negative. NOTIFICATION: Critical Value/emergent results were discussed by telephone with the ordering physician, Dr. Delano Miranda M.D., at 21:58 hours on 10/10/2019. Signer Name: Erick Matthews MD  Signed: 10/10/2019 9:58 PM  Workstation Name: [facility]    Ct Cerebral Perfusion With & Without Contrast    Result Date: 10/10/2019  Normal brain perfusion CT. Signer Name: Wil Fraga MD  Signed: 10/10/2019 10:33 PM  Workstation Name: [facility]      Echo:  Results for orders placed during the hospital encounter of 10/10/19   Adult Transthoracic Echo Complete W/ Cont if Necessary Per Protocol (With Agitated Saline)    Narrative · Left ventricular systolic function is normal.  · Calculated EF = 52.0%. Estimated EF appears to be in the range of 56 -   60%.  · Left ventricular diastolic dysfunction (grade I) consistent with   impaired relaxation.  · Saline test results are positive for right to left atrial level shunt.          Results: Reviewed.  I reviewed the patient's new laboratory and imaging results.  I independently reviewed the patient's new images.    Medications: Reviewed.    Assessment/Plan   A / P     Assessment:    53 y.o.male, admitted on 10/10/2019 with Cerebrovascular accident (CVA), unspecified mechanism (CMS/HCC) [I63.9]:     1. AIS s/p tPA  1. MRI: 1 cm acute infarct in the Right Thalamic region.  2. PFO ( (+) Saline test, per ECHO 10/10/19 )  2. HTN  3. Dyslipidemia   4. T2DM on oral antidiabetics at home.    Results from last 7 days   Lab Units 10/12/19  0659 10/12/19  0503 10/11/19  2029  10/11/19  1634 10/11/19  1152 10/11/19  0519   GLUCOSE mg/dL 155* 195* 125 154* 191* 153*     Lab Results   Lab Value Date/Time    HGBA1C 7.40 (H) 10/11/2019 0456       Diet: Diet Regular; Cardiac, Consistent Carbohydrate, Low Fat   Advance Directives: Code Status and Medical Interventions:   Ordered at: 10/10/19 0057     Code Status:    CPR     Medical Interventions (Level of Support Prior to Arrest):    Full        Plan:    1. Goal: Glucose < 180 mg/dL.   2. May need Cardiology follow up for (+) Saline Test in ECHO / TCD, we will defer to Neurology.  3. Disposition: Transfer to Telemetry Unit./Home when OK with Neurology.    Plan of care and goals reviewed during interdisciplinary rounds.  I discussed the patient's findings and my recommendations with patient, family and nursing staff    Level of Risk is High due to:  illness with threat to life or bodily function.     Time: 25 minutes, in direct patient care, with the patient and family and/or on the nelson coordinating care with other health care providers.     I have spent > 50% percent of this time, counseling and discussing management.       Dani Mulilgan MD, FACP, FCCP, CNSC  Intensive Care Medicine, Nutrition Support and Pulmonary Medicine     [x]  Primary Attending  []  Consultant

## 2019-10-12 NOTE — PROGRESS NOTES
Daily Progress Note  Neurology     LOS: 2 days     Subjective     Chief Complaint: Left-sided weakness.    Interval History: Doing much better.  No acute issues overnight.  Left-sided strength has improved significantly.  He is status post IV TPA.    ROS: Negative for fever, chest pain or shortness of breath.    Objective     Vital signs in last 24 hours:  Temp:  [97.8 °F (36.6 °C)-98.8 °F (37.1 °C)] 97.8 °F (36.6 °C)  Heart Rate:  [67-89] 70  Resp:  [16-20] 18  BP: (103-142)/(64-96) 126/78      Physical Exam:   General: Lying in bed with eyes open. In NAD.     Respiratory: Respirations unlabored   CV: RRR       Neurologic Exam:   Mental status: Awake, alert, Follows commands. Speech fluent   CN: II-XII intact to detailed exam    Motor: Strength full (5/5) throughout in BUE and BLE    Reflexes: 1+ right upper and lower extremity and 2+ in left upper and lower extremity.    Babinski: Positive on the left.          Results Review:    Results from last 7 days   Lab Units 10/11/19  0456   WBC 10*3/mm3 6.61   HEMOGLOBIN g/dL 13.7   HEMATOCRIT % 41.2   PLATELETS 10*3/mm3 247     Results from last 7 days   Lab Units 10/11/19  0456   SODIUM mmol/L 135*   POTASSIUM mmol/L 3.9   CHLORIDE mmol/L 100   CO2 mmol/L 27.0   BUN mg/dL 16   CREATININE mg/dL 0.65*   CALCIUM mg/dL 8.7     Ct Angiogram Head With & Without Contrast    Result Date: 10/11/2019  1. By NASCET criteria 0% stenosis at either carotid bifurcation. 2. Both vertebral arteries are patent with left mildly dominant. 3. No intracranial vascular cut off. No focal central stenosis. Please note: Despite several attempts, the sagittal and coronal 3-D reformatted imaging through the neck cannot be transmitted for review. If these images become available, I will addend this report. Signer Name: Naomi Daly MD  Signed: 10/11/2019 10:39 AM  Workstation Name: [email or web address]   Radiology [facility]    Ct Head Without Contrast    Result Date: 10/11/2019  No acute intracranial findings. No significant change from prior study. Signer Name: Carlos Giron MD  Signed: 10/11/2019 11:54 PM  Workstation Name: [email or web address]  [facility]    Ct Angiogram Neck With & Without Contrast    Result Date: 10/11/2019  1. By NASCET criteria 0% stenosis at either carotid bifurcation. 2. Both vertebral arteries are patent with left mildly dominant. 3. No intracranial vascular cut off. No focal central stenosis. Please note: Despite several attempts, the sagittal and coronal 3-D reformatted imaging through the neck cannot be transmitted for review. If these images become available, I will addend this report. Signer Name: Naomi Daly MD  Signed: 10/11/2019 10:39 AM  Workstation Name: [email or web address]  [facility]    Mri Brain With & Without Contrast    Result Date: 10/11/2019  1 cm acute infarct in the right thalamic region. There is no evidence of hemorrhage.  D:  10/11/2019 E:  10/11/2019     This report was finalized on 10/11/2019 5:40 PM by Dr. Raj Dillard MD.      Xr Chest 1 View    Result Date: 10/10/2019  1. Negative chest. Signer Name: Wil Fraga MD  Signed: 10/10/2019 11:06 PM  [email or web address]    Ct Head Without Contrast Stroke Protocol    Result Date: 10/10/2019  1. Suspected small cyst acute or subacute infarct in the head of the right caudate nucleus. 2. No evidence of intracranial hemorrhage. The remainder of the examination is negative. NOTIFICATION: Critical Value/emergent results were discussed by telephone with the ordering physician, Dr. Delano Miranda M.D., at 21:58 hours on 10/10/2019. Signer Name: Erick Matthews MD  Signed: 10/10/2019 9:58 PM  Workstation [facility]: [facility]    Ct Cerebral Perfusion With & Without Contrast    Result Date:  10/10/2019  Normal brain perfusion CT. Signer Name: Wil Fraga MD  Signed: 10/10/2019 10:33 PM  Workstation Name: [facility]-  [facility] personally reviewed MRI brain without contrast which showed acute 1 cm size right thalamic infarct.    2D echocardiogram showed normal ejection fraction with PFO.      Assessment/Plan     1.  Acute right thalamic lacunar stroke:  -Status post IV TPA . Left-sided strength has improved significantly.  Stroke work-up was essentially unremarkable.  2D echo did show evidence of PFO however unlikely to be the etiology of this stroke.  Looking at the size and location, it is a small vessel lacunar infarct likely caused by long-term history of hypertension and significant hyperlipidemia.  Continue with aggressive medical management including aspirin 325 mg daily and high-dose statin for secondary stroke prevention.  He reports side effects with Lipitor in the past so can consider Crestor and see how he does.  -Okay to discharge patient home when okay with primary team.  Neurology will sign off.  -Follow-up with Dr. Kelly in outpatient neurology clinic in 2 months.    Dany Kelly MD  10/12/19  11:15 AM

## 2019-10-13 LAB
GLUCOSE BLDC GLUCOMTR-MCNC: 161 MG/DL (ref 70–130)
GLUCOSE BLDC GLUCOMTR-MCNC: 171 MG/DL (ref 70–130)
GLUCOSE BLDC GLUCOMTR-MCNC: 171 MG/DL (ref 70–130)
GLUCOSE BLDC GLUCOMTR-MCNC: 185 MG/DL (ref 70–130)

## 2019-10-13 PROCEDURE — 99231 SBSQ HOSP IP/OBS SF/LOW 25: CPT | Performed by: PSYCHIATRY & NEUROLOGY

## 2019-10-13 PROCEDURE — 99232 SBSQ HOSP IP/OBS MODERATE 35: CPT | Performed by: INTERNAL MEDICINE

## 2019-10-13 PROCEDURE — 82962 GLUCOSE BLOOD TEST: CPT

## 2019-10-13 RX ADMIN — SODIUM CHLORIDE, PRESERVATIVE FREE 10 ML: 5 INJECTION INTRAVENOUS at 10:37

## 2019-10-13 RX ADMIN — LISINOPRIL: 20 TABLET ORAL at 21:35

## 2019-10-13 RX ADMIN — ASPIRIN 325 MG ORAL TABLET 162 MG: 325 PILL ORAL at 08:49

## 2019-10-13 RX ADMIN — SODIUM CHLORIDE, PRESERVATIVE FREE 10 ML: 5 INJECTION INTRAVENOUS at 21:38

## 2019-10-13 RX ADMIN — APIXABAN 5 MG: 5 TABLET, FILM COATED ORAL at 21:35

## 2019-10-13 RX ADMIN — LISINOPRIL: 20 TABLET ORAL at 08:50

## 2019-10-13 RX ADMIN — PANTOPRAZOLE SODIUM 40 MG: 40 TABLET, DELAYED RELEASE ORAL at 06:33

## 2019-10-13 RX ADMIN — APIXABAN 5 MG: 5 TABLET, FILM COATED ORAL at 08:49

## 2019-10-13 NOTE — PROGRESS NOTES
INTENSIVIST   PROGRESS NOTE     Hospital:  LOS: 3 days      S     Mr. Vaughn Huynh, 53 y.o. male is followed for:      AIS s/p TPA    PFO (patent foramen ovale)    DM    As an Intensivist, we provide an integrated approach to the ICU patient and family, medical management of comorbid conditions, lead interdisciplinary rounds and coordinate the care with all other services, including those from other specialists.     Interval History:  Uneventful night.  Improving.  No complaints.     The patient's relevant past medical, surgical and social history were reviewed and updated in Epic as appropriate.     ROS:   Constitutional: Negative for fever.   Respiratory: Negative for dyspnea.   Cardiovascular: Negative for chest pain.   Gastrointestinal: Negative for  nausea, vomiting and diarrhea.        O     Vitals:  Temp: 98.1 °F (36.7 °C) (10/13/19 0800) Temp  Min: 97.7 °F (36.5 °C)  Max: 98.3 °F (36.8 °C)   Temp core:      BP: 109/76 (10/13/19 0600) BP  Min: 102/75  Max: 129/92   Pulse: 84 (10/13/19 0600) Pulse  Min: 62  Max: 97   Resp: 14 (10/13/19 0400) Resp  Min: 14  Max: 20   SpO2: 95 % (10/13/19 0600) SpO2  Min: 93 %  Max: 99 %   Device: room air (10/13/19 0600)    Flow Rate:   No Data Recorded     Intake/Ouptut 24 hrs (7:00AM - 6:59 AM)  Intake/Output       10/12/19 0700 - 10/13/19 0659    Intake (ml) 1800    Output (ml) 1000    Net (ml) 800    Last Weight  91.1 kg (200 lb 13.4 oz)           Physical Examination  Telemetry:  Rhythm: normal sinus rhythm (10/13/19 0600)     QTc Interval (Sec): 0.39 (10/12/19 2000)   Constitutional:  No acute distress.   Cardiovascular: RRR.   Normal heart sounds.  No murmurs, gallop or rub.   Respiratory: Normal breath sounds  No adventitious sounds.   Abdominal:  Soft with no tenderness.  No distension.   No HSM.   Extremities: Warm.  Dry.  No cyanosis.  No Edema   Neurological:   Alert, Oriented, Cooperative.  Best Eye Response: 3-->(E3) to speech (10/13/19 0600)  Best Motor  Response: 6-->(M6) obeys commands (10/13/19 0600)  Best Verbal Response: 5-->(V5) oriented (10/13/19 0600)  Angie Coma Scale Score: 14 (10/13/19 0600)   Lines/Drains/Airways: Peripheral IV(s)     NIH Stroke Scale  Interval: (shift change) (10/12/19 1900)  1a. Level of Consciousness: 0-->Alert, keenly responsive (10/13/19 0700)  1b. LOC Questions: 0-->Answers both questions correctly (10/13/19 0700)  1c. LOC Commands: 0-->Performs both tasks correctly (10/13/19 0700)  2. Best Gaze: 0-->Normal (10/13/19 0700)  3. Visual: 1-->Partial hemianopia(baseline) (10/13/19 0700)  4. Facial Palsy: 1-->Minor paralysis (flattened nasolabial fold, asymmetry on smiling) (10/13/19 0700)  5a. Motor Arm, Left: 0-->No drift, limb holds 90 (or 45) degrees for full 10 secs (10/13/19 0700)  5b. Motor Arm, Right: 0-->No drift, limb holds 90 (or 45) degrees for full 10 secs (10/13/19 0700)  6a. Motor Leg, Left: 0-->No drift, leg holds 30 degree position for full 5 secs (10/13/19 0700)  6b. Motor Leg, Right: 0-->No drift, leg holds 30 degree position for full 5 secs (10/13/19 0700)  7. Limb Ataxia: 0-->Absent (10/13/19 0700)  8. Sensory: 0-->Normal, no sensory loss (10/13/19 0700)  9. Best Language: 0-->No aphasia, normal (10/13/19 0700)  10. Dysarthria: 0-->Normal (10/13/19 0700)  11. Extinction and Inattention (formerly Neglect): 0-->No abnormality (10/13/19 0700)  Total (NIH Stroke Scale): 2 (10/13/19 0700)    No labs today.    Images:  Ct Head Without Contrast    Result Date: 10/11/2019  No acute intracranial findings. No significant change from prior study. Signer Name: Carlos Giron MD  Signed: 10/11/2019 11:54 PM  Workstation Name: [id number]  [facility]    Mri Brain With & Without Contrast    Result Date: 10/11/2019  1 cm acute infarct in the right thalamic region. There is no evidence of hemorrhage.  D:  10/11/2019 E:  10/11/2019     This report was finalized on 10/11/2019 5:40 PM by Dr. Raj Dillard MD.         Echo:  Results for orders placed during the hospital encounter of 10/10/19   Adult Transthoracic Echo Complete W/ Cont if Necessary Per Protocol (With Agitated Saline)    Narrative · Left ventricular systolic function is normal.  · Calculated EF = 52.0%. Estimated EF appears to be in the range of 56 -   60%.  · Left ventricular diastolic dysfunction (grade I) consistent with   impaired relaxation.  · Saline test results are positive for right to left atrial level shunt.          Results: Reviewed.  I reviewed the patient's new laboratory and imaging results.  I independently reviewed the patient's new images.    Medications: Reviewed.    Assessment/Plan   A / P     Assessment:    53 y.o.male, admitted on 10/10/2019 with Cerebrovascular accident (CVA), unspecified mechanism (CMS/HCC) [I63.9]:     1. AIS s/p tPA  1. MRI: 1 cm acute infarct in the Right Thalamic region.  2. PFO ( (+) Saline test, per ECHO 10/10/19 )  3. Anticoagulation with APIxaban   2. HTN on ACE + HCTZ  3. Dyslipidemia - Evaluation for PSK 9 } Per Cardiology  4. T2DM on oral antidiabetics at home.    Results from last 7 days   Lab Units 10/13/19  0730 10/12/19  2036 10/12/19  1614 10/12/19  1122 10/12/19  0659 10/12/19  0503   GLUCOSE mg/dL 161* 164* 142* 187* 155* 195*     Lab Results   Lab Value Date/Time    HGBA1C 7.40 (H) 10/11/2019 0456       Diet: Diet Regular; Cardiac, Consistent Carbohydrate, Low Fat   Advance Directives: Code Status and Medical Interventions:   Ordered at: 10/10/19 2244     Code Status:    CPR     Medical Interventions (Level of Support Prior to Arrest):    Full        Plan:    1. Goal: Glucose < 180 mg/dL. Continue current doses of Insulin SQ.  2. DOTTIE probably tomorrow.   3. Disposition: Transfer to Telemetry Unit. } If OK with cardiology.   1. CBC, BMP in AM    Plan of care and goals reviewed during interdisciplinary rounds.  I discussed the patient's findings and my recommendations with patient, family and nursing  staff    Level of Risk is High due to:  illness with threat to life or bodily function.     Time: 25 minutes, in direct patient care, with the patient and family and/or on the nelson coordinating care with other health care providers.     I have spent > 50% percent of this time, counseling and discussing management.       Dani Mulligan MD, FACP, FCCP, CNSC  Intensive Care Medicine, Nutrition Support and Pulmonary Medicine     [x]  Primary Attending  []  Consultant

## 2019-10-13 NOTE — PLAN OF CARE
Problem: Patient Care Overview  Goal: Plan of Care Review  Outcome: Ongoing (interventions implemented as appropriate)   10/13/19 5932   Coping/Psychosocial   Plan of Care Reviewed With patient   Plan of Care Review   Progress improving   OTHER   Outcome Summary Pt. transferred from the ICU and has denied having any pain. VSS, on room air and NSR per tele. Pt. walked 1200 ft in the hallway with standby asisst. He has been up in the chair all day. Pt. will be NPO after midnight for a DOTTIE. Will continue to monitor.        Problem: Stroke (Ischemic) (Adult)  Goal: Signs and Symptoms of Listed Potential Problems Will be Absent, Minimized or Managed (Stroke)  Outcome: Ongoing (interventions implemented as appropriate)   10/13/19 9723   Goal/Outcome Evaluation   Problems Assessed (Stroke (Ischemic)) all   Problems Assessed (Stroke (Ischemic)) none

## 2019-10-13 NOTE — PROGRESS NOTES
"                                 [facility] Progress Note      LOS: 3 days   Patient Care Team:  Sen Nichole MD as PCP - General (Internal Medicine)    Chief Complaint:    Chief Complaint   Patient presents with   • Stroke       Subjective     Interval History:     Patient Complaints:        Review of Systems:   A 14 point review of systems was negative except as was stated in the HPI      Objective     Vital Sign Min/Max for last 24 hours  Temp  Min: 97.7 °F (36.5 °C)  Max: 98.3 °F (36.8 °C)   BP  Min: 102/75  Max: 129/92   Pulse  Min: 62  Max: 97   Resp  Min: 14  Max: 20   SpO2  Min: 93 %  Max: 99 %   No Data Recorded   Weight  Min: 91.1 kg (200 lb 13.4 oz)  Max: 91.1 kg (200 lb 13.4 oz)     Flowsheet Rows      First Filed Value   Admission Height  182.9 cm (72\") Documented at 10/10/2019 2158   Admission Weight  98.6 kg (217 lb 6 oz) Documented at 10/10/2019 2155          Physical Exam:  General Appearance: Alert, appears stated age and cooperative  Lungs: Clear to auscultation  Heart:: RRR   No Murmurs, Rubs or Gallops  Abdomen: Soft and nontender with adequate bowel sounds.  No organomegaly  Extremities: No cyanosis, clubbing or edema  Pulses: Pulses palpable and equal bilaterally  Skin: Warm and dry with no rash  Psych: Normal     Results Review:     I reviewed the patient's new clinical results.  Results from last 7 days   Lab Units 10/11/19  0456   SODIUM mmol/L 135*   POTASSIUM mmol/L 3.9   CHLORIDE mmol/L 100   CO2 mmol/L 27.0   BUN mg/dL 16   CREATININE mg/dL 0.65*   GLUCOSE mg/dL 148*   CALCIUM mg/dL 8.7     Results from last 7 days   Lab Units 10/11/19  0456 10/10/19  2201 10/10/19  2159   WBC 10*3/mm3 6.61 7.62  --    HEMOGLOBIN g/dL 13.7 14.4  --    HEMOGLOBIN, POC g/dL  --   --  14.3   HEMATOCRIT % 41.2 43.6  --    HEMATOCRIT POC %  --   --  42   PLATELETS 10*3/mm3 247 270  --      Lab Results   Lab Value Date/Time    TROPONINT <0.010 10/10/2019 2201     Results from last 7 days "   Lab Units 10/11/19  0456   CHOLESTEROL mg/dL 239*   TRIGLYCERIDES mg/dL 824*   HDL CHOL mg/dL 25*   LDL CHOL mg/dL 77     Results from last 7 days   Lab Units 10/11/19  0456   INR  0.99     Results from last 7 days   Lab Units 10/10/19  2159   PH, ARTERIAL pH units 7.37           Medication Review: yes  Current Facility-Administered Medications   Medication Dose Route Frequency Provider Last Rate Last Dose   • apixaban (ELIQUIS) tablet 5 mg  5 mg Oral Q12H Sen Ibarra MD   5 mg at 10/13/19 0849   • aspirin tablet 162 mg  162 mg Oral Daily Sen Ibarra MD   162 mg at 10/13/19 0849    Or   • aspirin suppository 300 mg  300 mg Rectal Daily Sen Ibarra MD       • insulin lispro (humaLOG) injection 0-9 Units  0-9 Units Subcutaneous 4x Daily AC & at Bedtime Irina Mcadams APRN   2 Units at 10/13/19 0849   • lisinopril (PRINIVIL,ZESTRIL) 20 mg, hydrochlorothiazide (HYDRODIURIL) 12.5 mg for ZESTORETIC 20-12.5   Oral Q12H Dani Mulligan MD       • pantoprazole (PROTONIX) EC tablet 40 mg  40 mg Oral Q AM Dani Mulligan MD   40 mg at 10/13/19 0633   • sodium chloride 0.9 % flush 10 mL  10 mL Intravenous Q12H Irina Mcadams APRN   10 mL at 10/12/19 2029   • sodium chloride 0.9 % flush 10 mL  10 mL Intravenous PRN Irina Mcadams APRN             AIS    Hyperlipidemia    Hypertension    Obstructive sleep apnea syndrome    Suspected cerebrovascular accident (CVA) s/p TPA    PFO (patent foramen ovale)        Impression      Acute CVA with small right thalamic infarct by CT status post TPA therapy  Hypertension  Diabetes  Remote cigarette smoker  Hyperlipidemia with statin intolerance  Abnormal transthoracic echo with presumed PFO and right to left shunt at the atrial level  Probable untreated sleep apnea  ?  Cryptogenic stroke          Plan     Continue aspirin and NOAC  We will attempt to arrange transesophageal echocardiogram for tomorrow  PSK 9 inhibitor as outpatient  Outpatient sleep  study  Hypertension control  Rusty for telemetry                Sen Ibarra MD  10/13/19  10:03 AM

## 2019-10-13 NOTE — PLAN OF CARE
Problem: Patient Care Overview  Goal: Plan of Care Review  Outcome: Ongoing (interventions implemented as appropriate)   10/12/19 1800   Coping/Psychosocial   Plan of Care Reviewed With patient   Plan of Care Review   Progress improving   OTHER   Outcome Summary NIH 1. Cardiology team on board . DOTTIE anticipated Monday? Pt started on Asa 162mg and Eloquist today. VSS. Eating with good appetite. Continent of bowel and bladder. Walked complete [place] of unit multiple times. Steady gait, (had childhood leg braces, has current knee issues), but slightly side to side     Goal: Interprofessional Rounds/Family Conf  Outcome: Ongoing (interventions implemented as appropriate)

## 2019-10-13 NOTE — PLAN OF CARE
Problem: Patient Care Overview  Goal: Plan of Care Review  Outcome: Ongoing (interventions implemented as appropriate)   10/13/19 0319   Coping/Psychosocial   Plan of Care Reviewed With patient   Plan of Care Review   Progress improving   OTHER   Outcome Summary VSS, NIH 1 due to very slight Left facial droop. First dose of eliquis given. Pt ambulates and transfers independently with minimal stand by assist. Anticipating DOTTIE on Monday. Will continue to monitor.        Problem: Stroke (Ischemic) (Adult)  Goal: Signs and Symptoms of Listed Potential Problems Will be Absent, Minimized or Managed (Stroke)  Outcome: Ongoing (interventions implemented as appropriate)      Problem: Thrombolytic Therapy (Adult)  Goal: Signs and Symptoms of Listed Potential Problems Will be Absent, Minimized or Managed (Thrombolytic Therapy)  Outcome: Ongoing (interventions implemented as appropriate)

## 2019-10-13 NOTE — PROGRESS NOTES
Daily Progress Note  Neurology     LOS: 3 days     Subjective     Chief Complaint: Left-sided weakness.    Interval History: No acute issues overnight.  Left-sided weakness has resolved.  Cardiology saw the patient yesterday and Eliquis 5 mg twice daily has been started in addition to aspirin 81 mg daily.  Plan is to do DOTTIE tomorrow for further evaluation.  2D echocardiogram had shown small PFO.    ROS: Negative for fever, chest pain or shortness of breath.    Objective     Vital signs in last 24 hours:  Temp:  [97.7 °F (36.5 °C)-98.3 °F (36.8 °C)] 98.1 °F (36.7 °C)  Heart Rate:  [62-97] 92  Resp:  [14-20] 14  BP: (102-132)/(73-92) 132/85      Physical Exam:   General: Lying in bed with eyes open. In NAD.     Respiratory: Respirations unlabored   CV: RRR       Neurologic Exam:   Mental status: Awake, alert, Follows commands. Speech fluent   CN: II-XII intact to detailed exam    Motor: Strength full (5/5) throughout in BUE and BLE    Reflexes: 2+ and symmetric throughout          Results Review:    Results from last 7 days   Lab Units 10/11/19  0456   WBC 10*3/mm3 6.61   HEMOGLOBIN g/dL 13.7   HEMATOCRIT % 41.2   PLATELETS 10*3/mm3 247     Results from last 7 days   Lab Units 10/11/19  0456   SODIUM mmol/L 135*   POTASSIUM mmol/L 3.9   CHLORIDE mmol/L 100   CO2 mmol/L 27.0   BUN mg/dL 16   CREATININE mg/dL 0.65*   CALCIUM mg/dL 8.7       Labs were reviewed.    Assessment/Plan     1.  Lacunar right thalamic stroke:  -He is status post IV TPA.  Left sided strength is improved significantly.  He is now on Eliquis 5 mg twice daily recommended by cardiology in addition to aspirin 81 mg daily.  He is scheduled to have DOTTIE tomorrow for further evaluation.  2D echocardiogram had shown PFO.  Looking at the size and location of the stroke, it is likely lacunar stroke, caused by small vessel disease in a patient with long-term  history of hypertension and uncontrolled hyperlipidemia.  Cardiology is also planning to do outpatient cardiac monitoring to rule out possibility of A. Fib.  -Plan is also to start PSK 9 inhibitor as an outpatient because of history of side effects with statins in the past.      Dany Kelly MD  10/13/19  10:54 AM

## 2019-10-14 ENCOUNTER — APPOINTMENT (OUTPATIENT)
Dept: CARDIOLOGY | Facility: HOSPITAL | Age: 54
End: 2019-10-14

## 2019-10-14 LAB
ANION GAP SERPL CALCULATED.3IONS-SCNC: 8 MMOL/L (ref 5–15)
BASOPHILS # BLD AUTO: 0.07 10*3/MM3 (ref 0–0.2)
BASOPHILS NFR BLD AUTO: 0.9 % (ref 0–1.5)
BUN BLD-MCNC: 15 MG/DL (ref 6–20)
BUN/CREAT SERPL: 18.5 (ref 7–25)
CALCIUM SPEC-SCNC: 9.4 MG/DL (ref 8.6–10.5)
CHLORIDE SERPL-SCNC: 97 MMOL/L (ref 98–107)
CO2 SERPL-SCNC: 29 MMOL/L (ref 22–29)
CREAT BLD-MCNC: 0.81 MG/DL (ref 0.76–1.27)
DEPRECATED RDW RBC AUTO: 40.7 FL (ref 37–54)
EOSINOPHIL # BLD AUTO: 0.24 10*3/MM3 (ref 0–0.4)
EOSINOPHIL NFR BLD AUTO: 3.2 % (ref 0.3–6.2)
ERYTHROCYTE [DISTWIDTH] IN BLOOD BY AUTOMATED COUNT: 12.8 % (ref 12.3–15.4)
GFR SERPL CREATININE-BSD FRML MDRD: 100 ML/MIN/1.73
GLUCOSE BLD-MCNC: 162 MG/DL (ref 65–99)
GLUCOSE BLDC GLUCOMTR-MCNC: 103 MG/DL (ref 70–130)
GLUCOSE BLDC GLUCOMTR-MCNC: 134 MG/DL (ref 70–130)
GLUCOSE BLDC GLUCOMTR-MCNC: 157 MG/DL (ref 70–130)
GLUCOSE BLDC GLUCOMTR-MCNC: 180 MG/DL (ref 70–130)
HCT VFR BLD AUTO: 46.9 % (ref 37.5–51)
HGB BLD-MCNC: 15.3 G/DL (ref 13–17.7)
IMM GRANULOCYTES # BLD AUTO: 0.08 10*3/MM3 (ref 0–0.05)
IMM GRANULOCYTES NFR BLD AUTO: 1.1 % (ref 0–0.5)
LYMPHOCYTES # BLD AUTO: 1.85 10*3/MM3 (ref 0.7–3.1)
LYMPHOCYTES NFR BLD AUTO: 24.5 % (ref 19.6–45.3)
MCH RBC QN AUTO: 28.7 PG (ref 26.6–33)
MCHC RBC AUTO-ENTMCNC: 32.6 G/DL (ref 31.5–35.7)
MCV RBC AUTO: 87.8 FL (ref 79–97)
MONOCYTES # BLD AUTO: 0.66 10*3/MM3 (ref 0.1–0.9)
MONOCYTES NFR BLD AUTO: 8.8 % (ref 5–12)
NEUTROPHILS # BLD AUTO: 4.64 10*3/MM3 (ref 1.7–7)
NEUTROPHILS NFR BLD AUTO: 61.5 % (ref 42.7–76)
NRBC BLD AUTO-RTO: 0 /100 WBC (ref 0–0.2)
PLATELET # BLD AUTO: 287 10*3/MM3 (ref 140–450)
PMV BLD AUTO: 9.6 FL (ref 6–12)
POTASSIUM BLD-SCNC: 4.8 MMOL/L (ref 3.5–5.2)
RBC # BLD AUTO: 5.34 10*6/MM3 (ref 4.14–5.8)
SODIUM BLD-SCNC: 134 MMOL/L (ref 136–145)
WBC NRBC COR # BLD: 7.54 10*3/MM3 (ref 3.4–10.8)

## 2019-10-14 PROCEDURE — 99231 SBSQ HOSP IP/OBS SF/LOW 25: CPT | Performed by: PSYCHIATRY & NEUROLOGY

## 2019-10-14 PROCEDURE — 85025 COMPLETE CBC W/AUTO DIFF WBC: CPT | Performed by: INTERNAL MEDICINE

## 2019-10-14 PROCEDURE — 93312 ECHO TRANSESOPHAGEAL: CPT | Performed by: INTERNAL MEDICINE

## 2019-10-14 PROCEDURE — 97116 GAIT TRAINING THERAPY: CPT | Performed by: PHYSICAL THERAPIST

## 2019-10-14 PROCEDURE — G0108 DIAB MANAGE TRN  PER INDIV: HCPCS

## 2019-10-14 PROCEDURE — 80048 BASIC METABOLIC PNL TOTAL CA: CPT | Performed by: INTERNAL MEDICINE

## 2019-10-14 PROCEDURE — 93321 DOPPLER ECHO F-UP/LMTD STD: CPT

## 2019-10-14 PROCEDURE — 99153 MOD SED SAME PHYS/QHP EA: CPT

## 2019-10-14 PROCEDURE — 82962 GLUCOSE BLOOD TEST: CPT

## 2019-10-14 PROCEDURE — 93325 DOPPLER ECHO COLOR FLOW MAPG: CPT | Performed by: INTERNAL MEDICINE

## 2019-10-14 PROCEDURE — 93325 DOPPLER ECHO COLOR FLOW MAPG: CPT

## 2019-10-14 PROCEDURE — 25010000002 MIDAZOLAM PER 1 MG: Performed by: INTERNAL MEDICINE

## 2019-10-14 PROCEDURE — 99152 MOD SED SAME PHYS/QHP 5/>YRS: CPT

## 2019-10-14 PROCEDURE — 93321 DOPPLER ECHO F-UP/LMTD STD: CPT | Performed by: INTERNAL MEDICINE

## 2019-10-14 PROCEDURE — 99233 SBSQ HOSP IP/OBS HIGH 50: CPT | Performed by: INTERNAL MEDICINE

## 2019-10-14 PROCEDURE — 25010000002 FENTANYL CITRATE (PF) 100 MCG/2ML SOLUTION: Performed by: INTERNAL MEDICINE

## 2019-10-14 PROCEDURE — 93312 ECHO TRANSESOPHAGEAL: CPT

## 2019-10-14 RX ORDER — FENTANYL CITRATE 50 UG/ML
INJECTION, SOLUTION INTRAMUSCULAR; INTRAVENOUS
Status: COMPLETED | OUTPATIENT
Start: 2019-10-14 | End: 2019-10-14

## 2019-10-14 RX ORDER — MIDAZOLAM HYDROCHLORIDE 1 MG/ML
INJECTION INTRAMUSCULAR; INTRAVENOUS
Status: COMPLETED | OUTPATIENT
Start: 2019-10-14 | End: 2019-10-14

## 2019-10-14 RX ADMIN — FENTANYL CITRATE 50 MCG: 50 INJECTION, SOLUTION INTRAMUSCULAR; INTRAVENOUS at 14:12

## 2019-10-14 RX ADMIN — METHOHEXITAL SODIUM 20 MG: 500 INJECTION, POWDER, LYOPHILIZED, FOR SOLUTION INTRAMUSCULAR; INTRAVENOUS; RECTAL at 14:33

## 2019-10-14 RX ADMIN — LISINOPRIL: 20 TABLET ORAL at 15:17

## 2019-10-14 RX ADMIN — APIXABAN 5 MG: 5 TABLET, FILM COATED ORAL at 15:18

## 2019-10-14 RX ADMIN — MIDAZOLAM HYDROCHLORIDE 2 MG: 1 INJECTION, SOLUTION INTRAMUSCULAR; INTRAVENOUS at 14:09

## 2019-10-14 RX ADMIN — METHOHEXITAL SODIUM 20 MG: 500 INJECTION, POWDER, LYOPHILIZED, FOR SOLUTION INTRAMUSCULAR; INTRAVENOUS; RECTAL at 14:19

## 2019-10-14 RX ADMIN — ASPIRIN 325 MG ORAL TABLET 162 MG: 325 PILL ORAL at 15:17

## 2019-10-14 RX ADMIN — MIDAZOLAM HYDROCHLORIDE 2 MG: 1 INJECTION, SOLUTION INTRAMUSCULAR; INTRAVENOUS at 14:12

## 2019-10-14 RX ADMIN — MIDAZOLAM HYDROCHLORIDE 2 MG: 1 INJECTION, SOLUTION INTRAMUSCULAR; INTRAVENOUS at 14:22

## 2019-10-14 RX ADMIN — FENTANYL CITRATE 50 MCG: 50 INJECTION, SOLUTION INTRAMUSCULAR; INTRAVENOUS at 14:09

## 2019-10-14 RX ADMIN — APIXABAN 5 MG: 5 TABLET, FILM COATED ORAL at 21:17

## 2019-10-14 RX ADMIN — SODIUM CHLORIDE, PRESERVATIVE FREE 10 ML: 5 INJECTION INTRAVENOUS at 21:20

## 2019-10-14 RX ADMIN — FENTANYL CITRATE 50 MCG: 50 INJECTION, SOLUTION INTRAMUSCULAR; INTRAVENOUS at 14:22

## 2019-10-14 RX ADMIN — METHOHEXITAL SODIUM 20 MG: 500 INJECTION, POWDER, LYOPHILIZED, FOR SOLUTION INTRAMUSCULAR; INTRAVENOUS; RECTAL at 14:13

## 2019-10-14 RX ADMIN — SODIUM CHLORIDE, PRESERVATIVE FREE 10 ML: 5 INJECTION INTRAVENOUS at 15:19

## 2019-10-14 NOTE — PLAN OF CARE
Problem: Patient Care Overview  Goal: Plan of Care Review  Outcome: Ongoing (interventions implemented as appropriate)   10/14/19 0103   Coping/Psychosocial   Plan of Care Reviewed With patient;spouse   Plan of Care Review   Progress improving   OTHER   Outcome Summary Pt A&O X4. RA. VSS. NSR per tele. Ambuated 600 feet this evening. NIH: 1 for left side facial droop. NPO currently for DOTTIE in am. No complaints at this time. Will continue to monitor.        Problem: Stroke (Ischemic) (Adult)  Goal: Signs and Symptoms of Listed Potential Problems Will be Absent, Minimized or Managed (Stroke)  Outcome: Ongoing (interventions implemented as appropriate)   10/14/19 0103   Goal/Outcome Evaluation   Problems Assessed (Stroke (Ischemic)) all   Problems Assessed (Stroke (Ischemic)) none       Problem: Thrombolytic Therapy (Adult)  Goal: Signs and Symptoms of Listed Potential Problems Will be Absent, Minimized or Managed (Thrombolytic Therapy)  Outcome: Ongoing (interventions implemented as appropriate)   10/14/19 0103   Goal/Outcome Evaluation   Problems Assessed (Thrombolytic Therapy) all   Problems Assessed (Thrombolytic Therapy) none

## 2019-10-14 NOTE — PLAN OF CARE
Problem: Patient Care Overview  Goal: Plan of Care Review  Outcome: Ongoing (interventions implemented as appropriate)   10/14/19 1716   Coping/Psychosocial   Plan of Care Reviewed With patient;spouse   Plan of Care Review   Progress improving   OTHER   Outcome Summary Pt. alert, oriented x 4, and pleasant. VSS. He is ambulating well with standby assist. He had a DOTTIE today and is awaiting those results. He is voiding well per urinal. He has sat up in the chair today and his spouse is at bedside. He has had no c/o pain. His NIH score was a 1 due to slight left sided facial drooping. Will continue to monitor.        Problem: Stroke (Ischemic) (Adult)  Goal: Signs and Symptoms of Listed Potential Problems Will be Absent, Minimized or Managed (Stroke)  Outcome: Ongoing (interventions implemented as appropriate)   10/14/19 1716   Goal/Outcome Evaluation   Problems Assessed (Stroke (Ischemic)) all   Problems Assessed (Stroke (Ischemic)) none

## 2019-10-14 NOTE — PLAN OF CARE
Problem: Patient Care Overview  Goal: Plan of Care Review  Outcome: Ongoing (interventions implemented as appropriate)   10/14/19 1143   Coping/Psychosocial   Plan of Care Reviewed With patient;spouse   Plan of Care Review   Progress improving   OTHER   Outcome Summary Pt amb 800' with no AD with supervision. Pt able to ascend/descend 10 steps with L handrail with supervision. Pt reported no issues with ambulation or stairs.        Problem: Stroke (Ischemic) (Adult)  Goal: Signs and Symptoms of Listed Potential Problems Will be Absent, Minimized or Managed (Stroke)  Outcome: Ongoing (interventions implemented as appropriate)

## 2019-10-14 NOTE — THERAPY TREATMENT NOTE
Patient Name: Vaughn Huynh  : 1965    MRN: [id number]                              Today's Date: 10/14/2019       Admit Date: 10/10/2019    Visit Dx:     ICD-10-CM ICD-9-CM   1. Cerebrovascular accident (CVA), unspecified mechanism (CMS/HCC) I63.9 434.91     Patient Active Problem List   Diagnosis   • Abnormal liver enzymes   • Excessive sweating   • Ganglion   • Heartburn   • Hyperlipidemia   • Hypertension   • Acute benign hemorrhagic glomerulonephritic syndrome   • Influenza due to influenza A virus   • Knee pain   • Myopathy   • Obstructive sleep apnea syndrome   • Osteoarthritis   • Postnasal drip   • Impaired glucose tolerance   • Nerve root disorder   • Shortness of breath   • Sore throat   • Parageusia   • Urinary symptom or sign   • Vitamin D deficiency   • Hematuria   • Elevated liver function tests   • Colon cancer screening   • Bilateral shoulder pain   • Eczema   • Need for vaccination   • Encounter for long-term current use of medication   • Nocturia   • Acute meniscal tear of right knee   • Rectus diastasis   • Suspected cerebrovascular accident (CVA) s/p TPA   • Cerebrovascular accident (CVA) (CMS/HCC)   • PFO (patent foramen ovale)     Past Medical History:   Diagnosis Date   • Adult BMI 27.0-27.9 kg/sq m    • Cerebrovascular accident (CVA) (CMS/HCC) 10/11/2019   • Elevated cholesterol    • Esophagitis    • Heartburn    • Hyperlipidemia 2016   • Hypertension 2016   • Knee pain     right   • Myopathy 2016   • Osteoarthritis 2016    right knee     Past Surgical History:   Procedure Laterality Date   • BACK SURGERY       General Information     Row Name 10/14/19 1143          PT Evaluation Time/Intention    Document Type  therapy note (daily note)  -CS     Mode of Treatment  physical therapy  -CS     Row Name 10/14/19 1143          General Information    Patient Profile Reviewed?  yes  -CS     Existing Precautions/Restrictions  fall  -CS     Row Name 10/14/19 1148           Cognitive Assessment/Intervention- PT/OT    Orientation Status (Cognition)  oriented x 4  -CS     Row Name 10/14/19 1143          Safety Issues, Functional Mobility    Safety Issues Affecting Function (Mobility)  safety precautions follow-through/compliance;safety precaution awareness  -CS     Impairments Affecting Function (Mobility)  endurance/activity tolerance;strength  -CS       User Key  (r) = Recorded By, (t) = Taken By, (c) = Cosigned By    Initials Name Provider Type    CS Kiya Monge, PT Physical Therapist        Mobility     Row Name 10/14/19 1143          Bed Mobility Assessment/Treatment    Comment (Bed Mobility)  Pt was UIC at start and end of therapy session  -CS     Row Name 10/14/19 1143          Transfer Assessment/Treatment    Comment (Transfers)  Recall proper hand placement with sit to stand with no VC's.  -CS     Row Name 10/14/19 1143          Bed-Chair Transfer    Bed-Chair [place] (Transfers)  independent  -CS     Row Name 10/14/19 1143          Sit-Stand Transfer    Sit-Stand [place] (Transfers)  independent  -CS     Assistive Device (Sit-Stand Transfers)  -- No AD  -CS     Row Name 10/14/19 1143          Gait/Stairs Assessment/Training    57533 - Gait Training Minutes   10  -CS     Gait/Stairs Assessment/Training  gait/ambulation independence;gait/ambulation assistive device;distance ambulated;gait pattern  -CS     [place] Level (Gait)  supervision  -CS     Assistive Device (Gait)  other (see comments) No AD  -CS     Distance in Feet (Gait)  800  -CS     Pattern (Gait)  step-through  -CS     Deviations/Abnormal Patterns (Gait)  base of support, wide  -CS     Negotiation (Stairs)  stairs independence;stairs assistive device;handrail location;number of steps;ascending technique;descending technique  -CS     [place] Level (Stairs)  supervision  -CS     Assistive Device (Stairs)  other (see comments) No AD  -CS     Handrail Location (Stairs)  left side  (ascending)  -CS     Number of Steps (Stairs)  10  -CS     Ascending Technique (Stairs)  step-over-step  -CS     Descending Technique (Stairs)  step-over-step  -CS     Stairs, Impairments  strength decreased  -CS     Comment (Gait/Stairs)  Pt able to amb 800' with no AD with supervision. Pt able to ascend/descend 10 steps with L handrail with supervision with VC's on sequencing using stairs.   -CS       User Key  (r) = Recorded By, (t) = Taken By, (c) = Cosigned By    Initials Name Provider Type    CS Kiya Monge, PT Physical Therapist        Obj/Interventions    No documentation.       Goals/Plan     Row Name 10/14/19 1143          Bed Mobility Goal 1 (PT)    Activity/Assistive Device (Bed Mobility Goal 1, PT)  bed mobility activities, all  -CS     [place] Level/Cues Needed (Bed Mobility Goal 1, PT)  independent  -CS     Time Frame (Bed Mobility Goal 1, PT)  2 weeks  -CS     Progress/Outcomes (Bed Mobility Goal 1, PT)  goal met  -CS     Row Name 10/14/19 1143          Transfer Goal 1 (PT)    Activity/Assistive Device (Transfer Goal 1, PT)  sit-to-stand/stand-to-sit;bed-to-chair/chair-to-bed  -CS     [place] Level/Cues Needed (Transfer Goal 1, PT)  independent  -CS     Time Frame (Transfer Goal 1, PT)  2 weeks  -CS     Progress/Outcome (Transfer Goal 1, PT)  goal met  -CS     Row Name 10/14/19 1143          Gait Training Goal 1 (PT)    Activity/Assistive Device (Gait Training Goal 1, PT)  gait (walking locomotion)  -CS     [place] Level (Gait Training Goal 1, PT)  independent  -CS     Distance (Gait Goal 1, PT)  400'  -CS     Time Frame (Gait Training Goal 1, PT)  2 weeks  -CS     Progress/Outcome (Gait Training Goal 1, PT)  goal ongoing  -CS     Row Name 10/14/19 1143          Stairs Goal 1 (PT)    Activity/Assistive Device (Stairs Goal 1, PT)  ascending stairs;descending stairs;step-to-step  -CS     [place] Level/Cues Needed (Stairs Goal 1, PT)  supervision required  -CS     Number of  Stairs (Stairs Goal 1, PT)  3  -CS     Time Frame (Stairs Goal 1, PT)  2 weeks  -CS     Progress/Outcome (Stairs Goal 1, PT)  goal met  -CS       User Key  (r) = Recorded By, (t) = Taken By, (c) = Cosigned By    Initials Name Provider Type    Kiya Perez PT Physical Therapist        Clinical Impression     Row Name 10/14/19 1143          Pain Assessment    Additional Documentation  Pain Scale: Numbers Pre/Post-Treatment (Group)  -CS     Row Name 10/14/19 1143          Pain Scale: Numbers Pre/Post-Treatment    Pain Scale: Numbers, Pretreatment  0/10 - no pain  -CS     Pain Scale: Numbers, Post-Treatment  0/10 - no pain  -CS     Pre/Post Treatment Pain Comment  Pt reports no pain.   -CS     Row Name 10/14/19 1143          Plan of Care Review    Plan of Care Reviewed With  patient;spouse  -CS     Row Name 10/14/19 1143          Positioning and Restraints    Pre-Treatment Position  sitting in chair/recliner  -CS     Post Treatment Position  chair  -CS     In Chair  sitting;call light within reach;encouraged to call for assist;with family/caregiver  -CS       User Key  (r) = Recorded By, (t) = Taken By, (c) = Cosigned By    Initials Name Provider Type    CS Kiya Monge, CHRIS Physical Therapist        Outcome Measures     Row Name 10/14/19 1143          How much help from another person do you currently need...    Turning from your back to your side while in flat bed without using bedrails?  4  -CS     Moving from lying on back to sitting on the side of a flat bed without bedrails?  4  -CS     Moving to and from a bed to a chair (including a wheelchair)?  4  -CS     Standing up from a chair using your arms (e.g., wheelchair, bedside chair)?  4  -CS     Climbing 3-5 steps with a railing?  3  -CS     To walk in hospital room?  4  -CS     AM-PAC 6 Clicks Score (PT)  23  -CS     Row Name 10/14/19 1143          Functional Assessment    Outcome Measure Options  AM-PAC 6 Clicks Basic Mobility (PT)  -CS        User Key  (r) = Recorded By, (t) = Taken By, (c) = Cosigned By    Initials Name Provider Type    CS Kiya Monge, PT Physical Therapist        Physical Therapy Education     Title: PT OT SLP Therapies (In Progress)     Topic: Physical Therapy (Done)     Point: Mobility training (Done)     Learning Progress Summary           Patient Acceptance, E,D, VU,DU by DREW at 10/14/2019 11:43 AM    Comment:  Educated patient on technique and sequencing of ascending/descending stairs using L handrail.    Acceptance, E, DU,NR by AMENA at 10/11/2019 11:22 AM                   Point: Home exercise program (Done)     Learning Progress Summary           Patient Acceptance, E,D, VU,DU by DREW at 10/14/2019 11:43 AM    Comment:  Educated patient on technique and sequencing of ascending/descending stairs using L handrail.                   Point: Body mechanics (Done)     Learning Progress Summary           Patient Acceptance, E,D, VU,DU by DREW at 10/14/2019 11:43 AM    Comment:  Educated patient on technique and sequencing of ascending/descending stairs using L handrail.    Acceptance, E, DU,NR by AMENA at 10/11/2019 11:22 AM                   Point: Precautions (Done)     Learning Progress Summary           Patient Acceptance, E,D, VU,DU by DREW at 10/14/2019 11:43 AM    Comment:  Educated patient on technique and sequencing of ascending/descending stairs using L handrail.    Acceptance, E, DU,NR by AMENA at 10/11/2019 11:22 AM                               User Key     Initials Effective Dates Name Provider Type Discipline     04/03/18 -  Mayelin Trejo, PT Physical Therapist PT     03/26/19 -  Kiya Monge PT Physical Therapist PT              PT Recommendation and Plan  Planned Therapy Interventions (PT Eval): balance training, gait training, home exercise program, neuromuscular re-education, patient/family education, stair training, strengthening, transfer training  Plan of Care Reviewed With: patient, spouse  Progress:  improving  Outcome Summary: Pt amb 800' with no AD with supervision. Pt able to ascend/descend 10 steps with L handrail with supervision. Pt reported no issues with ambulation or stairs.      Time Calculation:   PT Charges     Row Name 10/14/19 1143             Time Calculation    Start Time  1143  -CS      PT Received On  10/14/19  -CS         Time Calculation- PT    Total Timed Code Minutes- PT  10 minute(s)  -CS         Timed Charges    87082 - Gait Training Minutes   10  -CS        User Key  (r) = Recorded By, (t) = Taken By, (c) = Cosigned By    Initials Name Provider Type    CS Kiya Monge, PT Physical Therapist        Therapy Charges for Today     Code Description Service Date Service Provider Modifiers Qty    [id number] HC GAIT TRAINING EA 15 MIN 10/14/2019 Kiya Monge, PT GP 1          PT G-Codes  Outcome Measure Options: AM-PAC 6 Clicks Basic Mobility (PT)  AM-PAC 6 Clicks Score (PT): 23  AM-PAC 6 Clicks Score (OT): 24  Modified Essence Scale: 1 - No significant disability despite symptoms.  Able to carry out all usual duties and activities.    Kiya Monge, CHRIS  10/14/2019

## 2019-10-14 NOTE — PROGRESS NOTES
Continued Stay Note  [facility]     Patient Name: Vaughn Huynh  MRN: [id number]  Today's Date: 10/14/2019    Admit Date: 10/10/2019    Discharge Plan     Row Name 10/14/19 1127       Plan    Plan  Home with HH    Patient/Family in Agreement with Plan  yes    Plan Comments  Spoke with pt. and SO at bedside. Pt. wants to go home with HH for PT/OT. Given a choice , pt. selected [facility]. Contacted Henry with referral and she will follow. Denies other needs. Will follow and update.     Final Discharge Disposition Code  06 - home with home health care        Discharge Codes    No documentation.       Expected Discharge Date and Time     Expected Discharge Date Expected Discharge Time    Oct 14, 2019             Wendy Newton RN

## 2019-10-14 NOTE — CONSULTS
"Diabetes Education  Assessment/Teaching    Patient Name:  Vaughn Huynh  YOB: 1965  MRN: [id number]  Admit Date:  10/10/2019      Assessment Date:  10/14/2019    Most Recent Value   General Information    Referral From:  A1c   Height  188 cm (74\")   Height Method  Stated   Weight  91.1 kg (200 lb 13.4 oz)   Weight Method  Bed scale   Pregnancy Assessment   Diabetes History   What type of diabetes do you have?  Type 2   Length of Diabetes Diagnosis  1 - 5 years   Current DM knowledge  fair   Have you had diabetes education/teaching in the past?  no   Do you test your blood sugar at home?  yes   Frequency of checks  a few times a week   Meter type  unsure of type   Who performs the test?  self   Typical readings  170's-180's fasting in the morning   How would you rate your diabetes control?  fair   How often do you check your feet?  weekly   When was the last time your doctor checked your feet?  at each visit   Do you have any diabetes complications?  other (comment) [current admission for CVA]   Education Preferences   What areas of diabetes would you like to learn about?  avoiding high blood sugar, diet information, exercise information, understanding diabetes, testing my blood sugar at home, stress/coping skills, resources on diabetes   Nutrition Information   Are you currently following a special meal plan?  occasionally   What is the biggest challenge you have with your diet?  Poor choices, Eating out   Do any of the following issues affect your eating habits?  occupation/shiftwork   Assessment Topics   Healthy Eating - Assessment  Needs education   Being Active - Assessment  Needs education   Taking Medication - Assessment  Needs education   Problem Solving - Assessment  Needs education   Reducing Risk - Assessment  Needs education   Healthy Coping - Assessment  Needs education   Monitoring - Assessment  Needs education   DM Goals            Most Recent Value   DM Education Needs   Meter  Has " own   Frequency of Testing  Daily   Blood Glucose Target Range  per ADA recommendations   Medication  Oral, Side effects   Problem Solving  Hypoglycemia, Hyperglycemia, Sick days, Signs, Symptoms, Treatment   Reducing Risks  A1C testing, Lipids, Blood pressure, Eye exam, Dental exam, Cardiovascular   Healthy Eating  Basic meal plan provided   Physical Activity  Walking   Physical Activity Frequency  Rarely, Discussed exercise importance   Healthy Coping  Appropriate   Discharge Plan  Home, Follow-up with PCP   Motivation  Engaged   Teaching Method  Explanation, Discussion, Handouts   Patient Response  Verbalized understanding, Needs reinforcement            Other Comments:  Saw Mr. Shook at bedside for diabetes education, family was present during visit. He states he was diagnosed with diabetes around 2 years ago, and at that time his A1c was 11.7. He states he currently takes metformin, and his blood sugars had been running fasting 120's, but recently in the last 6 to 8 months have been 170's-180's fasting in the morning. He states he has not been to his PCP in about a year. He states he had his A1c down to 6.5 at one time. He has not had any formal diabetes education, and states he struggles with meals and diet and how to plan for during his working hours, as he is often traveling and having to stop for lunch at different times of the day, and often not home until later in the evening so eating dinner later. He states he does not get much physical activity, but does try to stop every 2-3 hours when traveling for a small walk.  Discussed and taught Mr. Shook about type 2 diabetes self-management, risk factors, and importance of blood glucose control to reduce complications. Discussed pathophysiology of diabetes and high blood sugar, and why increased risk for complications.Target blood glucose readings and A1c goals per ADA were reviewed. Reviewed with him current A1c of 7.4 and discussed its significance. Signs,  "symptoms and treatment of hyperglycemia and hypoglycemia were discussed. Lifestyle changes such as physical activity with MD approval and healthy eating were encouraged. Discussed problems solving for his lunchtime meal, such as planning lunches at the beginning of the week and packing in a small cooler each day to keep in his car. Discussed basic meal plan using the plate method-he states dietician has also been in to see him and given information on heart healthy/diabetes diet. Discussed checking blood sugar more often for a few weeks-checking once daily, and alternating what time he checks to include fasting and 2 hours post prandial breakfast, lunch, and dinner, and discussed how the post prandial glucose can help him learn how different foods affect his blood sugar and help him adjust meals. Also discussed elevated fasting AM blood sugar and what could be contributing, discussed trying to add in bedtime snack to see how this affects AM blood sugar. He states he is considering changing PCP-recommended he keep record of all blood sugars at home and take to each PCP visit. Also suggested he call PCP for recommendations if blood sugar is trending high at home. Discussed outpatient dietician visit as he states he feels slightly overwhelmed with combining both heart healthy and diabetes diet-he is interested but would prefer to set up appointment after he is discharged and has all other appointments set up. Also discussed outpatient diabetes education-he also is interested in this but would like to wait on scheduling. Discussed physical activity and how it helps with lower blood sugar-suggested he add in activity (with MD approval) and start with small change such as 15 minutes twice a week, then build on that. Provided with copy of [employer] \"What is diabetes\" handout, ADA booklet \"Type 2 Diabetes-Where do I begin\", and pamphlet on outpatient services available. Provided with contact information for any " questions or needs. Thank you for the consult.      Electronically signed by:  Carmel Mo RN  10/14/19 1:40 PM

## 2019-10-14 NOTE — PROGRESS NOTES
"Neurology       Patient Care Team:  Sen Nichole MD as PCP - General (Internal Medicine)    Chief complaint transient diplopia, left-sided weakness;Right thalamic infarct      Subjective .     History: Feeling fine.  Walking with PT.  No recurrence of symptoms.  DOTTIE scheduled for 130.    ROS: No fever or chest pain    Objective     Vital Signs   Blood pressure 127/88, pulse 77, temperature 98.1 °F (36.7 °C), temperature source Oral, resp. rate 18, height 188 cm (74\"), weight 91.1 kg (200 lb 13.4 oz), SpO2 98 %.    Physical Exam:              Neuro: Well-developed middle-aged white man in no acute distress, alert, fluent and appropriate.  No dysarthria.  EOMI face symmetric  Moves all extremities well  Gait unremarkable    Results Review:              Brain MRI images reviewed, agree right thalamic infarct  A1c 7.4  Markedly elevated cholesterol as previously noted    Assessment/Plan     Right thalamic infarct status post TPA-doing well clinically.  Patient with multiple risk factors for small vessel disease, but given initial diplopia and confusion, agree concern for initial more extensive posterior circulation ischemia which could indicate there was an embolus that dissolved.  Agree with plan for DOTTIE, and for NOAC in short term during cardiac evaluation period, given low risk for bleed/falls, and concern for potential cardioembolic source.    I discussed the patients findings and my recommendations with patient and family    Sandhya Zabala MD  10/14/19  11:49 AM      "

## 2019-10-14 NOTE — PROGRESS NOTES
"                                 [facility] Progress Note      LOS: 4 days   Patient Care Team:  Sen Nichole MD as PCP - General (Internal Medicine)    Chief Complaint:    Chief Complaint   Patient presents with   • Stroke       Subjective     Interval History:     Patient Complaints:        Review of Systems:   A 14 point review of systems was negative except as was stated in the HPI      Objective     Vital Sign Min/Max for last 24 hours  Temp  Min: 97.7 °F (36.5 °C)  Max: 98.4 °F (36.9 °C)   BP  Min: 81/57  Max: 144/91   Pulse  Min: 77  Max: 102   Resp  Min: 16  Max: 18   SpO2  Min: 93 %  Max: 99 %   No Data Recorded   Weight  Min: 90.7 kg (200 lb)  Max: 90.7 kg (200 lb)     Flowsheet Rows      First Filed Value   Admission Height  182.9 cm (72\") Documented at 10/10/2019 2158   Admission Weight  98.6 kg (217 lb 6 oz) Documented at 10/10/2019 2155          Physical Exam:  General Appearance: Alert, appears stated age and cooperative  Lungs: Clear to auscultation  Heart:: RRR   No Murmurs, Rubs or Gallops  Abdomen: Soft and nontender with adequate bowel sounds.  No organomegaly  Extremities: No cyanosis, clubbing or edema  Pulses: Pulses palpable and equal bilaterally  Skin: Warm and dry with no rash  Psych: Normal     Results Review:     I reviewed the patient's new clinical results.  Results from last 7 days   Lab Units 10/14/19  0611 10/11/19  0456   SODIUM mmol/L 134* 135*   POTASSIUM mmol/L 4.8 3.9   CHLORIDE mmol/L 97* 100   CO2 mmol/L 29.0 27.0   BUN mg/dL 15 16   CREATININE mg/dL 0.81 0.65*   GLUCOSE mg/dL 162* 148*   CALCIUM mg/dL 9.4 8.7     Results from last 7 days   Lab Units 10/14/19  0611 10/11/19  0456 10/10/19  2201   WBC 10*3/mm3 7.54 6.61 7.62   HEMOGLOBIN g/dL 15.3 13.7 14.4   HEMATOCRIT % 46.9 41.2 43.6   PLATELETS 10*3/mm3 287 247 270     Lab Results   Lab Value Date/Time    TROPONINT <0.010 10/10/2019 2201     Results from last 7 days   Lab Units 10/11/19  0456 "   CHOLESTEROL mg/dL 239*   TRIGLYCERIDES mg/dL 824*   HDL CHOL mg/dL 25*   LDL CHOL mg/dL 77     Results from last 7 days   Lab Units 10/11/19  0456   INR  0.99     Results from last 7 days   Lab Units 10/10/19  2159   PH, ARTERIAL pH units 7.37           Medication Review: yes  Current Facility-Administered Medications   Medication Dose Route Frequency Provider Last Rate Last Dose   • apixaban (ELIQUIS) tablet 5 mg  5 mg Oral Q12H Sen Ibarra MD   5 mg at 10/14/19 1518   • aspirin tablet 162 mg  162 mg Oral Daily Sen Ibarra MD   162 mg at 10/14/19 1517    Or   • aspirin suppository 300 mg  300 mg Rectal Daily Sen Ibarra MD       • insulin lispro (humaLOG) injection 0-9 Units  0-9 Units Subcutaneous 4x Daily AC & at Bedtime Irina Mcadams APRN   2 Units at 10/13/19 2134   • lisinopril (PRINIVIL,ZESTRIL) 20 mg, hydrochlorothiazide (HYDRODIURIL) 12.5 mg for ZESTORETIC 20-12.5   Oral Q12H Dani Mulligan MD       • pantoprazole (PROTONIX) EC tablet 40 mg  40 mg Oral Q AM Dani Mulligan MD   Stopped at 10/14/19 0600   • sodium chloride 0.9 % flush 10 mL  10 mL Intravenous Q12H Irina Mcadams APRN   10 mL at 10/14/19 1519   • sodium chloride 0.9 % flush 10 mL  10 mL Intravenous PRN Irina Mcadams APRN             Cerebrovascular accident (CVA) (CMS/HCC)    Hyperlipidemia    Hypertension    Obstructive sleep apnea syndrome    Suspected cerebrovascular accident (CVA) s/p TPA    PFO (patent foramen ovale)        Impression      Acute CVA with small right thalamic infarct by CT status post TPA therapy  Hypertension  Diabetes  Remote cigarette smoker  Hyperlipidemia with statin intolerance  Abnormal transthoracic echo with presumed PFO and right to left shunt at the atrial level  Probable untreated sleep apnea  ?  Cryptogenic stroke          Plan     Continue aspirin and NOAC  PSK 9 inhibitor as outpatient  Outpatient sleep study  Hypertension control  Transesophageal echo results noted  Home in  a.m. on aspirin and Eliquis.  Will arrange for Repatha as an outpatient.  Plan sleep study extended monitoring and transcranial Doppler as outpatient                Sen Ibarra MD  10/14/19  4:10 PM

## 2019-10-14 NOTE — PROGRESS NOTES
[facility] Medicine Services  PROGRESS NOTE    Patient Name: Vaughn Huynh  : 1965  MRN: [id number]    Date of Admission: 10/10/2019  Primary Care Physician: Sen Nichole MD    Subjective   Subjective     CC:  Double vision    HPI:  Doing okay this am, wants to go home. States that he is bored.     Review of Systems  Gen- No fevers, chills  CV- No chest pain, palpitations  Resp- No cough, dyspnea  GI- No N/V/D, abd pain      All other systems reviewed and negative.     Objective   Objective     Vital Signs:   Temp:  [97.7 °F (36.5 °C)-98.4 °F (36.9 °C)] 98.1 °F (36.7 °C)  Heart Rate:  [77-92] 77  Resp:  [16-18] 18  BP: (104-136)/(69-88) 127/88  Total (NIH Stroke Scale): 1     Physical Exam:  Constitutional: No acute distress, awake, alert  HENT: NCAT, mucous membranes moist  Respiratory: Clear to auscultation bilaterally, respiratory effort normal   Cardiovascular: RRR, no murmurs, rubs, or gallops, palpable pedal pulses bilaterally  Gastrointestinal: Positive bowel sounds, soft, nontender, nondistended  Musculoskeletal: No bilateral ankle edema  Psychiatric: Appropriate affect, cooperative  Neurologic: Oriented x 3, strength symmetric in all extremities, Cranial Nerves grossly intact to confrontation, speech clear  Skin: No rashes    Results Reviewed:    Results from last 7 days   Lab Units 10/14/19  0611 10/11/19  0456 10/10/19  2201   WBC 10*3/mm3 7.54 6.61 7.62   HEMOGLOBIN g/dL 15.3 13.7 14.4   HEMATOCRIT % 46.9 41.2 43.6   PLATELETS 10*3/mm3 287 247 270   INR   --  0.99  --      Results from last 7 days   Lab Units 10/14/19  0611 10/11/19  0456 10/10/19  2201   SODIUM mmol/L 134* 135*  --    POTASSIUM mmol/L 4.8 3.9  --    CHLORIDE mmol/L 97* 100  --    CO2 mmol/L 29.0 27.0  --    BUN mg/dL 15 16  --    CREATININE mg/dL 0.81 0.65*  --    GLUCOSE mg/dL 162* 148*  --    CALCIUM mg/dL 9.4 8.7  --    ALT (SGPT) U/L  --  31 33   AST (SGOT) U/L  --  18 21   TROPONIN T ng/mL   --   --  <0.010     Estimated Creatinine Clearance: 135.9 mL/min (by C-G formula based on SCr of 0.81 mg/dL).    Microbiology Results Abnormal     None          Imaging Results (last 24 hours)     ** No results found for the last 24 hours. **          Results for orders placed during the hospital encounter of 10/10/19   Adult Transthoracic Echo Complete W/ Cont if Necessary Per Protocol (With Agitated Saline)    Narrative · Left ventricular systolic function is normal.  · Calculated EF = 52.0%. Estimated EF appears to be in the range of 56 -   60%.  · Left ventricular diastolic dysfunction (grade I) consistent with   impaired relaxation.  · Saline test results are positive for right to left atrial level shunt.          I have reviewed the medications:  Scheduled Meds:  apixaban 5 mg Oral Q12H   aspirin 162 mg Oral Daily   Or      aspirin 300 mg Rectal Daily   insulin lispro 0-9 Units Subcutaneous 4x Daily AC & at Bedtime   lisinopril-hydroCHLOROthiazide (ZESTORETIC) 20-12.5 mg combo dose  Oral Q12H   pantoprazole 40 mg Oral Q AM   sodium chloride 10 mL Intravenous Q12H     Continuous Infusions:   PRN Meds:.sodium chloride      Assessment/Plan   Assessment / Plan     Active Hospital Problems    Diagnosis  POA   • **Cerebrovascular accident (CVA) (CMS/[facility]) [I63.9]  Yes     Priority: High   • Suspected cerebrovascular accident (CVA) s/p TPA [R09.89]  Yes     Priority: High   • PFO (patent foramen ovale) [Q21.1]  Not Applicable     Priority: Medium   • Obstructive sleep apnea syndrome [G47.33]  Yes     Priority: Low   • Hypertension [I10]  Yes     Priority: Low   • Hyperlipidemia [E78.5]  Yes     Priority: Low      Resolved Hospital Problems   No resolved problems to display.        Brief Hospital Course to date:  Vaughn Huynh is a 53 y.o. male with past medical history of GERD, hyperlipidemia, hypertension, osteoarthritis of the right knee and obesity who presented with CVA on 10/10/19 and received TPA protocol in  the ICU. He was transferred out to telemetry, to our service on 10/14/19.    Plan:    CVA  --s/p TPA  --MRI showed 1 cm acute infarct in right thalamic region  --PFO with + saline test per TTE on 10/10/19. Plan for DOTTIE ? Today  --continue anticoagulation with Eliquis per Cards    HTN  --continue ACEI and HCTZ    HLD  --LDL of 77  --Cards to initiate PSK 9 inhibitor as outpatient    T2DM  --SSI for now, holding home dose oral hypoglycemics        DVT Prophylaxis:  Anticoagulated with Eliquis    Disposition: I expect the patient to be discharged home with assistance after DOTTIE    CODE STATUS:   Code Status and Medical Interventions:   Ordered at: 10/10/19 2244     Code Status:    CPR     Medical Interventions (Level of Support Prior to Arrest):    Full         Electronically signed by Danielle Tapia MD, 10/14/19, 8:25 AM.

## 2019-10-15 VITALS
OXYGEN SATURATION: 96 % | HEART RATE: 74 BPM | HEIGHT: 74 IN | RESPIRATION RATE: 16 BRPM | BODY MASS INDEX: 25.67 KG/M2 | WEIGHT: 200 LBS | SYSTOLIC BLOOD PRESSURE: 126 MMHG | DIASTOLIC BLOOD PRESSURE: 94 MMHG | TEMPERATURE: 97.9 F

## 2019-10-15 DIAGNOSIS — I48.0 PAROXYSMAL ATRIAL FIBRILLATION (HCC): Primary | ICD-10-CM

## 2019-10-15 LAB
BH CV ECHO MEAS - BSA(HAYCOCK): 2.2 M^2
BH CV ECHO MEAS - BSA: 2.2 M^2
BH CV ECHO MEAS - BZI_BMI: 25.7 KILOGRAMS/M^2
BH CV ECHO MEAS - BZI_METRIC_HEIGHT: 188 CM
BH CV ECHO MEAS - BZI_METRIC_WEIGHT: 90.7 KG
BH CV VAS BP LEFT ARM: NORMAL MMHG
GLUCOSE BLDC GLUCOMTR-MCNC: 149 MG/DL (ref 70–130)
LV EF 2D ECHO EST: 55 %

## 2019-10-15 PROCEDURE — 82962 GLUCOSE BLOOD TEST: CPT

## 2019-10-15 PROCEDURE — 99239 HOSP IP/OBS DSCHRG MGMT >30: CPT | Performed by: INTERNAL MEDICINE

## 2019-10-15 RX ORDER — ROSUVASTATIN CALCIUM 10 MG/1
10 TABLET, COATED ORAL NIGHTLY
Qty: 30 TABLET | Refills: 11 | Status: SHIPPED | OUTPATIENT
Start: 2019-10-15 | End: 2019-10-16

## 2019-10-15 RX ORDER — ROSUVASTATIN CALCIUM 10 MG/1
10 TABLET, COATED ORAL NIGHTLY
Status: DISCONTINUED | OUTPATIENT
Start: 2019-10-15 | End: 2019-10-15 | Stop reason: HOSPADM

## 2019-10-15 RX ADMIN — LISINOPRIL: 20 TABLET ORAL at 08:18

## 2019-10-15 RX ADMIN — PANTOPRAZOLE SODIUM 40 MG: 40 TABLET, DELAYED RELEASE ORAL at 05:24

## 2019-10-15 RX ADMIN — APIXABAN 5 MG: 5 TABLET, FILM COATED ORAL at 08:18

## 2019-10-15 RX ADMIN — ASPIRIN 325 MG ORAL TABLET 325 MG: 325 PILL ORAL at 08:18

## 2019-10-15 NOTE — PLAN OF CARE
Problem: Patient Care Overview  Goal: Plan of Care Review  Lisinopril/HCTZ held at 2100 due to B/P 106/75

## 2019-10-15 NOTE — PLAN OF CARE
Problem: Patient Care Overview  Goal: Plan of Care Review  Outcome: Ongoing (interventions implemented as appropriate)   10/15/19 0409   Coping/Psychosocial   Plan of Care Reviewed With patient   Plan of Care Review   Progress improving   A/O x3. Mild droop noted. NIH 1. Slept at intervals. V/S stable.    10/15/19 0409   Coping/Psychosocial   Plan of Care Reviewed With patient   Plan of Care Review   Progress improving       Problem: Stroke (Ischemic) (Adult)  Goal: Signs and Symptoms of Listed Potential Problems Will be Absent, Minimized or Managed (Stroke)  Outcome: Ongoing (interventions implemented as appropriate)   10/15/19 0409   Goal/Outcome Evaluation   Problems Assessed (Stroke (Ischemic)) all   Problems Assessed (Stroke (Ischemic)) none       Problem: Thrombolytic Therapy (Adult)  Goal: Signs and Symptoms of Listed Potential Problems Will be Absent, Minimized or Managed (Thrombolytic Therapy)  Outcome: Ongoing (interventions implemented as appropriate)   10/15/19 0409   Goal/Outcome Evaluation   Problems Assessed (Thrombolytic Therapy) all   Problems Assessed (Thrombolytic Therapy) none

## 2019-10-15 NOTE — PROGRESS NOTES
Continued Stay Note  [facility]     Patient Name: Vaughn Huynh  MRN: [id number]  Today's Date: 10/15/2019    Admit Date: 10/10/2019    Discharge Plan     Row Name 10/15/19 0955       Plan    Plan  Home w/Home Health    Patient/Family in Agreement with Plan  yes    Final Discharge Disposition Code  06 - home with home health care    Final Note  Spoke with patient's SO.  His plan is still to return home with [facility].  They deny any other discharge needs.  Notified Henry that patient would be discharging today.  Arabella Sadler RN [phone]    Row Name 10/15/19 0951       Case Management Discharge Note    Final Note: Spoke with patient's SO.  His plan is still to return home with [facility].  TThey deny any other discharge needs.  Notified Henry that patient would be discharging today.  Arabella Sadler RN [phone]    Destination      No service has been selected for the patient.      Durable Medical Equipment      No service has been selected for the patient.      Dialysis/Infusion      No service has been selected for the patient.      Home Medical Care      Service Provider Request Status Selected Services Address Phone Number Fax Number    [facility] Selected Home Health Services [address] [address] [phone]      Therapy      No service has been selected for the patient.      Community Resources      No service has been selected for the patient.             Final Discharge Disposition Code: 06 - home with home health care                            Discharge Codes    No documentation.       Expected Discharge Date and Time     Expected Discharge Date Expected Discharge Time    Oct 15, 2019             Arabella Sadler RN

## 2019-10-15 NOTE — PROGRESS NOTES
Case Management Discharge Note    Final Note: Spoke with patient.  His plan is still to return home with [facility].  He denies any discharge needs.  Notified Henry that patient would be discharging today.  Arabella Sadler RN [phone]    Destination      No service has been selected for the patient.      Durable Medical Equipment      No service has been selected for the patient.      Dialysis/Infusion      No service has been selected for the patient.      Home Medical Care      No service has been selected for the patient.      Therapy      No service has been selected for the patient.      Community Resources      No service has been selected for the patient.             Final Discharge Disposition Code: 06 - home with home health care

## 2019-10-15 NOTE — PROGRESS NOTES
"                                 [facility] Progress Note      LOS: 5 days   Patient Care Team:  Sen Nichole MD as PCP - General (Internal Medicine)    Chief Complaint:    Chief Complaint   Patient presents with   • Stroke       Subjective     Interval History:     Patient Complaints:  Denies cp, sob, palpitations.      Review of Systems:   A 14 point review of systems was negative except as was stated in the HPI      Objective     Vital Sign Min/Max for last 24 hours  Temp  Min: 97.6 °F (36.4 °C)  Max: 98 °F (36.7 °C)   BP  Min: 81/57  Max: 144/91   Pulse  Min: 73  Max: 102   Resp  Min: 16  Max: 18   SpO2  Min: 93 %  Max: 99 %   No Data Recorded   Weight  Min: 90.7 kg (200 lb)  Max: 90.7 kg (200 lb)     Flowsheet Rows      First Filed Value   Admission Height  182.9 cm (72\") Documented at 10/10/2019 2158   Admission Weight  98.6 kg (217 lb 6 oz) Documented at 10/10/2019 2155          Physical Exam:  General Appearance: Alert, appears stated age and cooperative  Lungs: Clear to auscultation  Heart:: RRR   No Murmurs, Rubs or Gallops  Abdomen: Soft and nontender with adequate bowel sounds.  No organomegaly  Extremities: No cyanosis, clubbing or edema  Pulses: Pulses palpable and equal bilaterally  Skin: Warm and dry with no rash  Psych: Normal     Results Review:     I reviewed the patient's new clinical results.  Results from last 7 days   Lab Units 10/14/19  0611 10/11/19  0456   SODIUM mmol/L 134* 135*   POTASSIUM mmol/L 4.8 3.9   CHLORIDE mmol/L 97* 100   CO2 mmol/L 29.0 27.0   BUN mg/dL 15 16   CREATININE mg/dL 0.81 0.65*   GLUCOSE mg/dL 162* 148*   CALCIUM mg/dL 9.4 8.7     Results from last 7 days   Lab Units 10/14/19  0611 10/11/19  0456 10/10/19  2201   WBC 10*3/mm3 7.54 6.61 7.62   HEMOGLOBIN g/dL 15.3 13.7 14.4   HEMATOCRIT % 46.9 41.2 43.6   PLATELETS 10*3/mm3 287 247 270     Lab Results   Lab Value Date/Time    TROPONINT <0.010 10/10/2019 2201     Results from last 7 days   Lab " Units 10/11/19  0456   CHOLESTEROL mg/dL 239*   TRIGLYCERIDES mg/dL 824*   HDL CHOL mg/dL 25*   LDL CHOL mg/dL 77     Results from last 7 days   Lab Units 10/11/19  0456   INR  0.99     Results from last 7 days   Lab Units 10/10/19  2159   PH, ARTERIAL pH units 7.37           Medication Review: yes  Current Facility-Administered Medications   Medication Dose Route Frequency Provider Last Rate Last Dose   • apixaban (ELIQUIS) tablet 5 mg  5 mg Oral Q12H Sen Ibarra MD   5 mg at 10/15/19 0818   • aspirin tablet 162 mg  162 mg Oral Daily Sen Ibarra MD   325 mg at 10/15/19 0818    Or   • aspirin suppository 300 mg  300 mg Rectal Daily Sen Ibarra MD       • insulin lispro (humaLOG) injection 0-9 Units  0-9 Units Subcutaneous 4x Daily AC & at Bedtime Irina Mcadams APRN   2 Units at 10/14/19 2117   • lisinopril (PRINIVIL,ZESTRIL) 20 mg, hydrochlorothiazide (HYDRODIURIL) 12.5 mg for ZESTORETIC 20-12.5   Oral Q12H Dani Mulligan MD       • pantoprazole (PROTONIX) EC tablet 40 mg  40 mg Oral Q AM Dani Mulligan MD   40 mg at 10/15/19 0524   • rosuvastatin (CRESTOR) tablet 10 mg  10 mg Oral Nightly José Miguel Dunaway, PA       • sodium chloride 0.9 % flush 10 mL  10 mL Intravenous Q12H Irina Mcadams APRN   10 mL at 10/14/19 2120   • sodium chloride 0.9 % flush 10 mL  10 mL Intravenous PRN Irina Mcadams APRN             Cerebrovascular accident (CVA) (CMS/HCC)    Hyperlipidemia    Hypertension    Obstructive sleep apnea syndrome    Suspected cerebrovascular accident (CVA) s/p TPA    PFO (patent foramen ovale)        Impression      Acute CVA with small right thalamic infarct by CT status post TPA therapy  Hypertension  Diabetes  Remote cigarette smoker  Hyperlipidemia with statin intolerance  Abnormal transthoracic echo with presumed PFO and right to left shunt at the atrial level  Probable untreated sleep apnea  ?  Cryptogenic stroke          Plan     Continue aspirin and NOAC  Outpatient  sleep study  Hypertension control  Transesophageal echo results noted  Home in a.m. on aspirin and Eliquis.    Plan sleep study extended monitoring and transcranial Doppler as outpatient  Holter monitor  F/u weeks              DANYELLE Ferrell  10/15/19  10:03 AM

## 2019-10-16 ENCOUNTER — TRANSITIONAL CARE MANAGEMENT TELEPHONE ENCOUNTER (OUTPATIENT)
Dept: INTERNAL MEDICINE | Facility: CLINIC | Age: 54
End: 2019-10-16

## 2019-10-16 ENCOUNTER — READMISSION MANAGEMENT (OUTPATIENT)
Dept: CALL CENTER | Facility: HOSPITAL | Age: 54
End: 2019-10-16

## 2019-10-16 RX ORDER — ROSUVASTATIN CALCIUM 10 MG/1
10 TABLET, COATED ORAL NIGHTLY
Qty: 30 TABLET | Refills: 11 | Status: SHIPPED | OUTPATIENT
Start: 2019-10-16 | End: 2020-10-07 | Stop reason: SDUPTHER

## 2019-10-16 NOTE — OUTREACH NOTE
Prep Survey      Responses   Facility patient discharged from?  [facility]   Is patient eligible?  Yes   Discharge diagnosis  Cerebrovascular accident (CVA)   Does the patient have one of the following disease processes/diagnoses(primary or secondary)?  Stroke (TIA)   Does the patient have Home health ordered?  Yes   What is the Home health agency?   [facility]   Is there a DME ordered?  No   Prep survey completed?  Yes          Kasia Moore RN

## 2019-10-17 NOTE — OUTREACH NOTE
Unable to connext with pt x 3 attempts to complete TCM Encounter. Pt is sched for TCM [facility] on 10/22/19.

## 2019-10-18 ENCOUNTER — READMISSION MANAGEMENT (OUTPATIENT)
Dept: CALL CENTER | Facility: HOSPITAL | Age: 54
End: 2019-10-18

## 2019-10-18 NOTE — OUTREACH NOTE
Stroke Week 1 Survey      Responses   Facility patient discharged from?  [facility]   Does the patient have one of the following disease processes/diagnoses(primary or secondary)?  Stroke (TIA)   Is there a successful TCM telephone encounter documented?  No   Week 1 attempt successful?  Yes   Call start time  1528   Call end time  1538   Discharge diagnosis  Cerebrovascular accident (CVA)   Is patient permission given to speak with other caregiver?  Yes   List who call center can speak with  Remedios Hare sig other   Person spoke with today (if not patient) and relationship  anthony Whittaker other   Meds reviewed with patient/caregiver?  Yes   Is the patient having any side effects they believe may be caused by any medication additions or changes?  No   Does the patient have all medications ordered at discharge?  Yes   Is the patient taking all medications as directed (includes completed medication regime)?  Yes   Does the patient have a primary care provider?   Yes   Does the patient have an appointment with their PCP within 7 days of discharge?  Yes   Comments regarding PCP  Sen Nichole MD PCP, appt 10/22/19.    Has the patient kept scheduled appointments due by today?  N/A   What is the Home health agency?   Patient driving, not eligible for .    Has home health visited the patient within 72 hours of discharge?  N/A   Psychosocial issues?  No   Does the patient require any assistance with activities of daily living such as eating, bathing, dressing, walking, etc.?  No   Does the patient have any residual symptoms from stroke/TIA?  Yes   Does the patient understand the diet ordered at discharge?  Yes   Did the patient receive a copy of their discharge instructions?  Yes   Nursing interventions  Reviewed instructions with patient [Reviewed with sig other. ]   What is the patient's perception of their health status since discharge?  Improving   Nursing interventions  Nurse provided patient education   Is the  patient able to teach back FAST for Stroke?  Yes   Is the patient/caregiver able to teach back the risk factors for a stroke?  Physical inactivity and obesity, High blood pressure-goal below 120/80, High Cholesterol, Diabetes   Is the patient/caregiver able to teach back signs and symptoms related to disease process for when to call PCP?  Yes   Is the patient/caregiver able to teach back signs and symptoms related to disease process for when to call 911?  Yes   Is the patient/caregiver able to teach back the hierarchy of who to call/visit for symptoms/problems? PCP, Specialist, Home health nurse, Urgent Care, ED, 911  Yes   Week 1 call completed?  Yes          Janice Dalton RN

## 2019-10-22 ENCOUNTER — OFFICE VISIT (OUTPATIENT)
Dept: INTERNAL MEDICINE | Facility: CLINIC | Age: 54
End: 2019-10-22

## 2019-10-22 VITALS
HEIGHT: 74 IN | WEIGHT: 202 LBS | BODY MASS INDEX: 25.93 KG/M2 | SYSTOLIC BLOOD PRESSURE: 126 MMHG | OXYGEN SATURATION: 99 % | TEMPERATURE: 98.5 F | DIASTOLIC BLOOD PRESSURE: 73 MMHG | HEART RATE: 95 BPM

## 2019-10-22 DIAGNOSIS — Z23 NEED FOR INFLUENZA VACCINATION: Primary | ICD-10-CM

## 2019-10-22 DIAGNOSIS — I63.9 CEREBROVASCULAR ACCIDENT (CVA), UNSPECIFIED MECHANISM (HCC): ICD-10-CM

## 2019-10-22 DIAGNOSIS — E78.00 PURE HYPERCHOLESTEROLEMIA: ICD-10-CM

## 2019-10-22 DIAGNOSIS — Q21.12 PFO (PATENT FORAMEN OVALE): ICD-10-CM

## 2019-10-22 DIAGNOSIS — E11.49 TYPE 2 DIABETES MELLITUS WITH OTHER NEUROLOGIC COMPLICATION, WITHOUT LONG-TERM CURRENT USE OF INSULIN (HCC): ICD-10-CM

## 2019-10-22 DIAGNOSIS — R06.83 SNORING: ICD-10-CM

## 2019-10-22 DIAGNOSIS — G47.33 OBSTRUCTIVE SLEEP APNEA SYNDROME: ICD-10-CM

## 2019-10-22 DIAGNOSIS — I10 ESSENTIAL HYPERTENSION: ICD-10-CM

## 2019-10-22 PROCEDURE — 90674 CCIIV4 VAC NO PRSV 0.5 ML IM: CPT | Performed by: NURSE PRACTITIONER

## 2019-10-22 PROCEDURE — 99495 TRANSJ CARE MGMT MOD F2F 14D: CPT | Performed by: NURSE PRACTITIONER

## 2019-10-22 PROCEDURE — 90471 IMMUNIZATION ADMIN: CPT | Performed by: NURSE PRACTITIONER

## 2019-10-22 RX ORDER — METFORMIN HYDROCHLORIDE 500 MG/1
500 TABLET, EXTENDED RELEASE ORAL 2 TIMES DAILY WITH MEALS
Refills: 0 | COMMUNITY
Start: 2019-09-03 | End: 2020-03-05 | Stop reason: SDUPTHER

## 2019-10-22 NOTE — PROGRESS NOTES
Transitional Care Follow Up Visit  Subjective     Vaughn Huynh is a 54 y.o. male who presents for a transitional care management visit.  He is accompanied by his wife today.  He was taken to [facility] via ambulance on 10/10/201, diagnosed with CVA and admitted that day; discharged 10/15/19.    Within 48 business hours after discharge our office contacted him via telephone to coordinate his care and needs.      I reviewed and discussed the details of that call along with the discharge summary, hospital problems, inpatient lab results, inpatient diagnostic studies, and consultation reports with Vaughn.     Current outpatient and discharge medications have been reconciled for the patient.  Reviewed by: MYRNA Robbins      No flowsheet data found.  Risk for Readmission (LACE) Score: 8 (10/15/2019  6:00 AM)      History of Present Illness   Course During Hospital Stay:  He was given TPA in the ICU, with subsequent resolution of neurologic symptoms.  He has been initiated on Eliquis, continues to take aspirin.  He has blurred vision in his left eye; he has had lasik surgery in that eye, and vision was blurry prior to CVA, he is unsure if blurriness due to previous vision deficit.  Otherwise, he does not report any neurological deficit.  During admission, it was discovered he has a patent foramen ovale.  He has a h/o T2DM, HTN, hyperlipidemia, SALVADOR.  He takes Glucophage as prescribed without adverse effects.  Prior to hospitalization, fasting blood sugars were 150-180.  Since discharge, they have been less than 130.  Continues to take lisinopril with HCTZ for hypertension without adverse effects.  He does not monitor blood pressure at home at this time.  He intolerant of statins, trialing Crestor at this time.  He was diagnosed with obstructive sleep apnea over 4 years ago.  Has not been using CPAP as prescribed, would like referral to sleep medicine.  Denies chest pain, dyspnea, orthopnea,  palpitations, lower extremity edema, headaches, weakness, confusion,  foot ulcerations, hypoglycemic symptoms, nausea, paresthesia of the feet, polydipsia, polyuria, polyphagia, and weight loss.  He has a follow-up appointment with cardiology, Dr. Ibarra, scheduled.       The following portions of the patient's history were reviewed and updated as appropriate: allergies, current medications, past family history, past medical history, past social history, past surgical history and problem list.    Review of Systems   Constitutional: Positive for fatigue. Negative for activity change, appetite change, diaphoresis and fever.   Respiratory: Negative for cough, chest tightness and wheezing.    Gastrointestinal: Negative for constipation, diarrhea and nausea.   Musculoskeletal: Negative for arthralgias and myalgias.   Neurological: Negative for syncope, facial asymmetry and speech difficulty.   Hematological: Does not bruise/bleed easily.   Psychiatric/Behavioral: Negative for confusion and sleep disturbance.       Objective   Physical Exam   Constitutional: He is oriented to person, place, and time. He appears well-developed and well-nourished.   HENT:   Head: Normocephalic.   Mouth/Throat: Oropharynx is clear and moist.   Eyes: Conjunctivae and EOM are normal. Pupils are equal, round, and reactive to light.   Neck: Normal range of motion. Neck supple.   Cardiovascular: Normal rate, regular rhythm, normal heart sounds and intact distal pulses.   Pulmonary/Chest: Effort normal and breath sounds normal.   Lymphadenopathy:     He has no cervical adenopathy.   Neurological: He is alert and oriented to person, place, and time. He has normal strength. No sensory deficit. Coordination and gait normal.   Cranial nerves II through XII normal   Skin: Skin is warm. Capillary refill takes less than 2 seconds.   Psychiatric: He has a normal mood and affect. His behavior is normal.       Assessment/Plan   Vaughn was seen today for  establish care.    Diagnoses and all orders for this visit:    Need for influenza vaccination  -     Flucelvax Quad=>4Years (PFS)  Snoring  -     Ambulatory Referral to Sleep Medicine  Cerebrovascular accident (CVA), unspecified mechanism (CMS/[facility])        - Continue to monitor for signs and symptoms of stroke  PFO (patent foramen ovale)  Obstructive sleep apnea syndrome  -     Ambulatory Referral to Sleep Medicine  Type 2 diabetes mellitus with other neurologic complication, without long-term current use of insulin (CMS/[facility])        - Follow diabetic diet        - Monitor blood sugars as discussed        - See eye doctor annually or as discussed        - Wear protective foot wear/no bare feet        - Check feet regularly for calluses or ulcers        - Discussed risk of poorly controlled diabetes and long-term complications        - Exercise as tolerated up to 30 minutes 5 days a week        - Take all medications as prescribed  -     glucose blood test strip; E11.9 Use what is covered by insurance  -     ONE TOUCH LANCETS misc; E11.9 use what is covered by insurance.  Essential hypertension        - Follow heart healthy diet.  Advised to reduce daily sodium intake to < 1500 mg per day.  Avoid processed & fast foods.        - Monitor blood pressure as discussed, keep log and bring to next appointment.          - Exercise as tolerated, with a goal of 30 minutes of moderate exercise most days.         - Take medications as prescribed.  Pure hypercholesterolemia        - Follow heart healthy diet, exercise as tolerated as noted above.        - Take medications as prescribed    Follow up with cardiology as scheduled.

## 2019-10-23 PROBLEM — E11.49 TYPE 2 DIABETES MELLITUS WITH NEUROLOGIC COMPLICATION, WITHOUT LONG-TERM CURRENT USE OF INSULIN: Status: ACTIVE | Noted: 2019-10-23

## 2019-10-25 ENCOUNTER — READMISSION MANAGEMENT (OUTPATIENT)
Dept: CALL CENTER | Facility: HOSPITAL | Age: 54
End: 2019-10-25

## 2019-10-25 NOTE — OUTREACH NOTE
Stroke Week 2 Survey      Responses   Facility patient discharged from?  [place]   Does the patient have one of the following disease processes/diagnoses(primary or secondary)?  Stroke (TIA)   Week 2 attempt successful?  No   Unsuccessful attempts  Attempt 1          Magdalena Lopez RN

## 2019-10-28 ENCOUNTER — READMISSION MANAGEMENT (OUTPATIENT)
Dept: CALL CENTER | Facility: HOSPITAL | Age: 54
End: 2019-10-28

## 2019-10-28 NOTE — OUTREACH NOTE
Stroke Week 2 Survey      Responses   Facility patient discharged from?  [place]   Does the patient have one of the following disease processes/diagnoses(primary or secondary)?  Stroke (TIA)   Week 2 attempt successful?  No   Unsuccessful attempts  Attempt 2          Francisca Shaffer RN

## 2019-11-21 ENCOUNTER — NURSE TRIAGE (OUTPATIENT)
Dept: CALL CENTER | Facility: HOSPITAL | Age: 54
End: 2019-11-21

## 2019-11-21 ENCOUNTER — TELEPHONE (OUTPATIENT)
Dept: CARDIOLOGY | Facility: CLINIC | Age: 54
End: 2019-11-21

## 2019-11-21 NOTE — TELEPHONE ENCOUNTER
Per Dr. Leslie, there was no A-fib on the Zio patch. It was ok.            Patient notified and aware.

## 2019-11-21 NOTE — TELEPHONE ENCOUNTER
"  Reason for Disposition  • General information question, no triage required and triager able to answer question    Additional Information  • Negative: [1] Caller is not with the adult (patient) AND [2] reporting urgent symptoms  • Negative: Lab result questions  • Negative: Medication questions  • Negative: Caller cannot be reached by phone  • Negative: Caller has already spoken to PCP or another triager  • Negative: RN needs further essential information from caller in order to complete triage  • Negative: Requesting regular office appointment  • Negative: [1] Caller requesting NON-URGENT health information AND [2] PCP's office is the best resource  • Negative: Health Information question, no triage required and triager able to answer question    Answer Assessment - Initial Assessment Questions  1. REASON FOR CALL or QUESTION: \"What is your reason for calling today?\" or \"How can I best help you?\" or \"What question do you have that I can help answer?\"  MD contact number.    Protocols used: INFORMATION ONLY CALL-ADULT-    "

## 2019-11-21 NOTE — TELEPHONE ENCOUNTER
They put an a fib moniter on me and they called me and it looked fine,. The told me to follow up with a specific provider Dr. Ibarra. Caller asking for contact info for this MD. Directory accessed and phone number given to caller.

## 2019-12-17 ENCOUNTER — OFFICE VISIT (OUTPATIENT)
Dept: PULMONOLOGY | Facility: CLINIC | Age: 54
End: 2019-12-17

## 2019-12-17 ENCOUNTER — OFFICE VISIT (OUTPATIENT)
Dept: NEUROLOGY | Facility: CLINIC | Age: 54
End: 2019-12-17

## 2019-12-17 VITALS
HEIGHT: 74 IN | BODY MASS INDEX: 25.8 KG/M2 | SYSTOLIC BLOOD PRESSURE: 126 MMHG | DIASTOLIC BLOOD PRESSURE: 70 MMHG | WEIGHT: 201 LBS

## 2019-12-17 VITALS
HEIGHT: 74 IN | BODY MASS INDEX: 26.18 KG/M2 | HEART RATE: 98 BPM | DIASTOLIC BLOOD PRESSURE: 72 MMHG | SYSTOLIC BLOOD PRESSURE: 126 MMHG | RESPIRATION RATE: 18 BRPM | OXYGEN SATURATION: 97 % | WEIGHT: 204 LBS

## 2019-12-17 DIAGNOSIS — Z86.73 RECENT CEREBROVASCULAR ACCIDENT (CVA): ICD-10-CM

## 2019-12-17 DIAGNOSIS — G47.19 EXCESSIVE DAYTIME SLEEPINESS: ICD-10-CM

## 2019-12-17 DIAGNOSIS — I63.81 THALAMIC STROKE (HCC): Primary | ICD-10-CM

## 2019-12-17 DIAGNOSIS — G47.33 OBSTRUCTIVE SLEEP APNEA: Primary | ICD-10-CM

## 2019-12-17 DIAGNOSIS — R06.83 SNORING: ICD-10-CM

## 2019-12-17 PROCEDURE — 99244 OFF/OP CNSLTJ NEW/EST MOD 40: CPT | Performed by: INTERNAL MEDICINE

## 2019-12-17 PROCEDURE — 99214 OFFICE O/P EST MOD 30 MIN: CPT | Performed by: PSYCHIATRY & NEUROLOGY

## 2019-12-17 NOTE — PROGRESS NOTES
Subjective:    CC: Vaughn Huynh is in clinic today for hospital follow-up.    HPI:  Mr. Shook is 54-year-old male with past medical history of type 2 diabetes, hyperlipidemia and hypertension was admitted to the hospital in October 2019.  He presented to the hospital with diplopia, slurred speech, confusion and left-sided weakness.  He was within the window and there were no contraindication for IV TPA administration so he received IV TPA.   MRI was done which showed tiny 1 cm size right thalamic stroke without any involvement of internal capsule.  The symptoms that he presented with improved within 24 hours and he was back to his baseline.  He underwent CT angiogram of brain and neck which did not reveal any flow-limiting stenosis or aneurysm.  2D echocardiogram and DOTTIE were performed as well which both showed presence of small PFO.  Cardiology saw the patient and it was decided to start him on anticoagulation because of possible embolic etiology of stroke.  Since that discharge, he has not had any new focal neurological signs or symptoms.  He has had 72-hour Holter monitoring done which did not reveal any evidence of A. fib or a flutter as well.  He continues to take aspirin 81 mg daily, Eliquis 5 mg twice daily.    The following portions of the patient's history were reviewed and updated as of 12/17/2019: allergies, social history and problem list.       Current Outpatient Medications:   •  apixaban (ELIQUIS) 5 MG tablet tablet, Take 1 tablet by mouth Every 12 (Twelve) Hours., Disp: 60 tablet, Rfl: 11  •  aspirin 81 MG tablet, Take  by mouth daily., Disp: , Rfl:   •  esomeprazole (nexIUM) 40 MG capsule, Take 40 mg by mouth Every Morning Before Breakfast., Disp: , Rfl:   •  glucose blood test strip, E11.9 Use what is covered by insurance, Disp: 100 each, Rfl: 12  •  lisinopril-hydrochlorothiazide (PRINZIDE,ZESTORETIC) 20-12.5 MG per tablet, TAKE 1 TABLET TWICE A DAY, Disp: 180 tablet, Rfl: 2  •  metFORMIN ER  "(GLUCOPHAGE-XR) 500 MG 24 hr tablet, , Disp: , Rfl: 0  •  ONE TOUCH LANCETS misc, E11.9 use what is covered by insurance., Disp: 100 each, Rfl: 12  •  rosuvastatin (CRESTOR) 10 MG tablet, Take 1 tablet by mouth Every Night., Disp: 30 tablet, Rfl: 11   Past Medical History:   Diagnosis Date   • Adult BMI 27.0-27.9 kg/sq m    • Cerebrovascular accident (CVA) (CMS/HCC) 10/11/2019   • Diabetes mellitus (CMS/HCC)    • Elevated cholesterol    • Esophagitis    • GERD (gastroesophageal reflux disease)    • Heartburn    • Hyperlipidemia 7/18/2016   • Hypertension 7/18/2016   • Knee pain     right   • Myopathy 7/18/2016   • Osteoarthritis 07/18/2016    right knee      Past Surgical History:   Procedure Laterality Date   • BACK SURGERY  2012      Family History   Problem Relation Age of Onset   • Cancer Mother         kindney   • Dementia Father    • Hypertension Father    • Diabetes Father    • Arthritis Father    • Hyperlipidemia Father    • Seizures Other    • Cancer Other    • Diabetes Other    • Hypertension Other    • Arthritis Other    • Hypertension Other    • Colon cancer Neg Hx    • Liver cancer Neg Hx    • Liver disease Neg Hx    • Esophageal cancer Neg Hx    • Stomach cancer Neg Hx         Review of Systems   Constitutional: Positive for appetite change.   Eyes: Positive for visual disturbance.   Musculoskeletal: Positive for arthralgias.   Skin: Positive for rash.   Neurological: Positive for light-headedness.     Objective:    /70   Ht 188 cm (74\")   Wt 91.2 kg (201 lb)   BMI 25.81 kg/m²     Neurology Exam:  General apperance: NAD.     Mental status: Alert, awake and oriented to time place and person.    Recent and Remote memory: Can recall 3/3 objects at 5 minutes. Can recall historical events.     Attention span and Concentration: Serial 7s: Normal.     Fund of knowledge:  Normal.     Language and Speech: No aphasia or dysarthria.    Naming , Repitition and Comprehension:  Can name objects, repeat a " sentence and follow commands. Speech is clear and fluent with good repetition, comprehension, and naming.    CN II to XII: Intact.    Opthalmoscopic Exam: No papilledema.    Motor:  Right UE muscle strength 5/5. Normal tone.     Left UE muscle strength 5/5. Normal tone.      Right LE muscle strength5/5. Normal tone.     Left LE muscle strength 5/5. Normal tone.      Sensory: Normal light touch, vibration and pinprick sensation bilaterally.    DTRs: 2+ bilaterally.    Babinski: Negative bilaterally.    Co-ordination: Normal finger-to-nose, heel to shin B/L.    Rhomberg: Negative.    Gait: Normal.    Cardiovascular: Regular rate and rhythm without murmur, gallop or rub.    Assessment and Plan:  1. Thalamic stroke (CMS/HCC)  Patient with history of tiny 1 cm right thalamic stroke.  Based on the location and size of the stroke, this is a lacunar/small vessel infarct in a patient with hyperlipidemia and type 2 diabetes.  I personally do not think that he needs to be on anticoagulation and aspirin 81 mg daily along with Crestor and antihypertensive medication should suffice to prevent secondary stroke.  However, he has not had a cardiology follow-up so I have advised him to schedule an appointment with Dr. Ibarra who saw him while he was admitted to the hospital.  If Dr. Ibarra feels that it is absolutely essential for him to continue Eliquis then is okay with me otherwise, aspirin, Crestor, strict blood pressure control along with keeping the stroke risk factors under control should help prevent secondary stroke.  He is doing fine and does not have any residual symptoms from the stroke.  I plan to see him back in 3 months for follow-up.    I spent 25 minutes face to face with the patient and spent more than 50% of this time  in management, instructions and education regarding above mentioned diagnosis and also on counseling and discussing about taking medication regularly, possible side effects with medication use,  importance of good sleep hygiene, good hydration and regular exercise.    Return in about 3 months (around 3/17/2020).

## 2019-12-17 NOTE — PROGRESS NOTES
CONSULT NOTE    Requested by:   Cyn Herrera, *   Sen Nichole MD      Chief Complaint   Patient presents with   • Consult   • Sleeping Problem       Subjective:  Vaughn Huynh is a 54 y.o. male.     History of Present Illness   Patient came in today for evaluation of possible sleep apnea. Patient endorses loud snoring. he also says that he has been tired and has fatigue during the day, for the past few years. The patient's family notes that he has occasional pauses in the breathing.    The patient says that he rarely gets restful night sleep and his quality has diminished considerably. he does have a tendency to feel sleepy while watching TV and reading a book.      he is not complaining of occasional headaches. There is known family history of sleep apnea, in his brother.     his [place] Sleepiness score was 4/24.     Patient suffers from CVA, hypertension & diabetes. he drinks 1-2 cups/cans of caffeinated drinks per day.      The following portions of the patient's history were reviewed and updated as appropriate: allergies, current medications, past family history, past medical history, past social history and past surgical history.    Review of Systems   Constitutional: Negative for chills, fatigue and fever.   HENT: Negative for sinus pressure, sneezing and sore throat.    Respiratory: Negative for cough, chest tightness, shortness of breath and wheezing.    Cardiovascular: Negative for palpitations and leg swelling.   Psychiatric/Behavioral: Positive for sleep disturbance.   All other systems reviewed and are negative.      Past Medical History:   Diagnosis Date   • Adult BMI 27.0-27.9 kg/sq m    • Cerebrovascular accident (CVA) (CMS/[facility]) 10/11/2019   • Diabetes mellitus (CMS/[facility])    • Elevated cholesterol    • Esophagitis    • GERD (gastroesophageal reflux disease)    • Heartburn    • Hyperlipidemia 7/18/2016   • Hypertension 7/18/2016   • Knee pain     right   • Myopathy 7/18/2016   •  "Osteoarthritis 2016    right knee       Social History     Tobacco Use   • Smoking status: Former Smoker     Packs/day: 1.00     Years: 15.00     Pack years: 15.00     Types: Cigarettes     Start date: 1983     Last attempt to quit: 2006     Years since quittin.9   • Smokeless tobacco: Never Used   • Tobacco comment: quit x 10 years   Substance Use Topics   • Alcohol use: Yes     Comment: VERY RARE          Objective:  Visit Vitals  /72   Pulse 98   Resp 18   Ht 188 cm (74.02\")   Wt 92.5 kg (204 lb)   SpO2 97%   BMI 26.18 kg/m²       Physical Exam   Constitutional: He is oriented to person, place, and time. He appears well-developed and well-nourished.   HENT:   Head: Atraumatic.   Crowded Oropharynx.    Eyes: Pupils are equal, round, and reactive to light. EOM are normal.   Neck: No JVD present. No tracheal deviation present. No thyromegaly present.   Increased adipose tissue.    Cardiovascular: Normal rate and regular rhythm.   Pulmonary/Chest: Effort normal and breath sounds normal. No respiratory distress. He has no wheezes.   Musculoskeletal: Normal range of motion. He exhibits no edema.   Gait was normal.   Neurological: He is alert and oriented to person, place, and time.   Skin: Skin is warm and dry.   Psychiatric: He has a normal mood and affect. His behavior is normal.   Vitals reviewed.        Assessment/Plan:  Vaughn was seen today for consult and sleeping problem.    Diagnoses and all orders for this visit:    Obstructive sleep apnea  -     Polysomnography 4 or More Parameters; Future    Snoring  -     Polysomnography 4 or More Parameters; Future    Excessive daytime sleepiness  -     Polysomnography 4 or More Parameters; Future    Recent cerebrovascular accident (CVA)  -     Polysomnography 4 or More Parameters; Future        Return in about 3 months (around 3/17/2020) for SleepJACKELINE/Mary, ....Also 7 mths w/ Dr. Woodall.    DISCUSSION(if any):  Laboratory workup was also " reviewed which showed   Lab Results   Component Value Date    CO2 29.0 10/14/2019     ===========================  ===========================    Sleep questionnaire was reviewed with the patient    The pathophysiology of sleep apnea was discussed, with him.     We will encourage him to schedule the sleep study soon.     The patient will definitely need to be considered for an in lab study due to recent CVA among other reasons.  It should be noted that a home sleep study is likely to underestimate the true AHI and is unable to assess sleep stages and abnormal sleep behaviors etc. The patient has understood.    Since he is at risk for central sleep apnea, he will definitely need an in lab sleep study.     The patient is agreeable to try CPAP/BiPAP, if needed.     Patient was educated on good sleep hygiene measures and voiced understanding of the same.     Patient was given reading material regarding sleep apnea.      Dictated utilizing Dragon dictation.    This document was electronically signed by Michelet Woodall MD on 12/17/19 at 2:07 PM

## 2019-12-30 ENCOUNTER — CALL CENTER PROGRAMS (OUTPATIENT)
Dept: CALL CENTER | Facility: HOSPITAL | Age: 54
End: 2019-12-30

## 2019-12-30 NOTE — OUTREACH NOTE
Stroke [place] Survey      Responses   Facility patient discharged from?  [facility]   Attempt successful  Yes   Call start time  1231   Person spoke with today (if not patient) and relationship  Patient   Did the patient die within 30 days of admission?  No   Did the patient die within 30 days of discharge?  No   Call end time  1235   Patient location 30 days post discharge if known  Home   Was the patient readmitted within 30 days of discharge?  No   Could you live alone without any help from another person?  Yes   Can you do everything that you were doing right before your stroke even if slower and not as much?  Yes   Are you completely back to the way you were right before your stroke?  Yes   Can you walk from one room to another without help from another person?  Yes   Can the patient sit up in bed without any help?  Yes   Call Center Essence Score  0   [place] score call completed  Yes   Comments  Reports no residual stroke symptoms.          Maricarmen Thompson RN

## 2020-01-22 ENCOUNTER — OFFICE VISIT (OUTPATIENT)
Dept: INTERNAL MEDICINE | Facility: CLINIC | Age: 55
End: 2020-01-22

## 2020-01-22 VITALS
RESPIRATION RATE: 16 BRPM | SYSTOLIC BLOOD PRESSURE: 122 MMHG | TEMPERATURE: 98.8 F | WEIGHT: 200.8 LBS | BODY MASS INDEX: 25.77 KG/M2 | DIASTOLIC BLOOD PRESSURE: 73 MMHG | OXYGEN SATURATION: 100 % | HEART RATE: 86 BPM | HEIGHT: 74 IN

## 2020-01-22 DIAGNOSIS — Z00.00 ROUTINE GENERAL MEDICAL EXAMINATION AT A HEALTH CARE FACILITY: ICD-10-CM

## 2020-01-22 DIAGNOSIS — M19.90 ARTHRITIS: ICD-10-CM

## 2020-01-22 DIAGNOSIS — R53.83 FATIGUE, UNSPECIFIED TYPE: ICD-10-CM

## 2020-01-22 DIAGNOSIS — E11.49 TYPE 2 DIABETES MELLITUS WITH OTHER NEUROLOGIC COMPLICATION, WITHOUT LONG-TERM CURRENT USE OF INSULIN (HCC): ICD-10-CM

## 2020-01-22 DIAGNOSIS — E11.9 TYPE 2 DIABETES MELLITUS WITHOUT COMPLICATION, WITHOUT LONG-TERM CURRENT USE OF INSULIN (HCC): Primary | ICD-10-CM

## 2020-01-22 DIAGNOSIS — I10 ESSENTIAL HYPERTENSION: ICD-10-CM

## 2020-01-22 PROCEDURE — 99396 PREV VISIT EST AGE 40-64: CPT | Performed by: INTERNAL MEDICINE

## 2020-01-22 NOTE — PROGRESS NOTES
Subjective     Patient ID: Vaughn Huynh is a 54 y.o. male. Patient is here for management of multiple medical problems.     Chief Complaint   Patient presents with   • Annual Exam     patient here for yearly check-up, initial visit with Dr. Nichole.     History of Present Illness     Annual exam.    Pt is first time meeting me.     Was with Dr Ramsey.     acking and arthritis.  Tired frequently.   Over whelmed at work at times.  Salivating more since cva.    Food change in taste.  Has not smoked for 12-14 years.  Started after numbing med for transesophogeal echo.    Was smoking 1ppd 10 years when he did smoke.                The following portions of the patient's history were reviewed and updated as appropriate: allergies, current medications, past family history, past medical history, past social history, past surgical history and problem list.    Review of Systems   Constitutional: Negative for appetite change, diaphoresis, fatigue and fever.   Respiratory: Negative for shortness of breath and stridor.    Gastrointestinal: Negative for blood in stool and constipation.   Musculoskeletal: Negative for joint swelling and myalgias.   Skin: Negative for pallor and rash.   Psychiatric/Behavioral: Negative for self-injury and sleep disturbance. The patient is not nervous/anxious and is not hyperactive.    All other systems reviewed and are negative.      Current Outpatient Medications:   •  apixaban (ELIQUIS) 5 MG tablet tablet, Take 1 tablet by mouth Every 12 (Twelve) Hours., Disp: 60 tablet, Rfl: 11  •  aspirin 81 MG tablet, Take  by mouth daily., Disp: , Rfl:   •  esomeprazole (nexIUM) 40 MG capsule, Take 40 mg by mouth Every Morning Before Breakfast., Disp: , Rfl:   •  glucose blood test strip, E11.9 Use what is covered by insurance, Disp: 100 each, Rfl: 12  •  lisinopril-hydrochlorothiazide (PRINZIDE,ZESTORETIC) 20-12.5 MG per tablet, TAKE 1 TABLET TWICE A DAY, Disp: 180 tablet, Rfl: 2  •  metFORMIN ER  "(GLUCOPHAGE-XR) 500 MG 24 hr tablet, Take 500 mg by mouth 2 (Two) Times a Day With Meals., Disp: , Rfl: 0  •  ONE TOUCH LANCETS misc, E11.9 use what is covered by insurance., Disp: 100 each, Rfl: 12  •  rosuvastatin (CRESTOR) 10 MG tablet, Take 1 tablet by mouth Every Night., Disp: 30 tablet, Rfl: 11    Objective      Blood pressure 122/73, pulse 86, temperature 98.8 °F (37.1 °C), temperature source Oral, resp. rate 16, height 188 cm (74\"), weight 91.1 kg (200 lb 12.8 oz), SpO2 100 %.    Physical Exam     General Appearance:    Alert, cooperative, no distress, appears stated age   Head:    Normocephalic, without obvious abnormality, atraumatic   Eyes:    PERRL, conjunctiva/corneas clear, EOM's intact   Ears:    Normal TM's and external ear canals, both ears   Nose:   Nares normal, septum midline, mucosa normal, no drainage   or sinus tenderness   Throat:   Lips, mucosa, and tongue normal; teeth and gums normal   Neck:   Supple, symmetrical, trachea midline, no adenopathy;        thyroid:  No enlargement/tenderness/nodules; no carotid    bruit or JVD   Back:     Symmetric, no curvature, ROM normal, no CVA tenderness   Lungs:     Clear to auscultation bilaterally, respirations unlabored   Chest wall:    No tenderness or deformity   Heart:    Regular rate and rhythm, S1 and S2 normal, no murmur,        rub or gallop   Abdomen:     Soft, non-tender, bowel sounds active all four quadrants,     no masses, no organomegaly   Extremities:   Extremities normal, atraumatic, no cyanosis or edema   Pulses:   2+ and symmetric all extremities   Skin:   Skin color, texture, turgor normal, no rashes or lesions   Lymph nodes:   Cervical, supraclavicular, and axillary nodes normal   Neurologic:   CNII-XII intact. Normal strength, sensation and reflexes       throughout      Results for orders placed or performed during the hospital encounter of 10/10/19   Troponin   Result Value Ref Range    Troponin T <0.010 0.000 - 0.030 ng/mL "   aPTT   Result Value Ref Range    PTT 27.5 24.0 - 37.0 seconds   AST   Result Value Ref Range    AST (SGOT) 21 1 - 40 U/L   ALT   Result Value Ref Range    ALT (SGPT) 33 1 - 41 U/L   CBC Auto Differential   Result Value Ref Range    WBC 7.62 3.40 - 10.80 10*3/mm3    RBC 5.08 4.14 - 5.80 10*6/mm3    Hemoglobin 14.4 13.0 - 17.7 g/dL    Hematocrit 43.6 37.5 - 51.0 %    MCV 85.8 79.0 - 97.0 fL    MCH 28.3 26.6 - 33.0 pg    MCHC 33.0 31.5 - 35.7 g/dL    RDW 12.6 12.3 - 15.4 %    RDW-SD 39.5 37.0 - 54.0 fl    MPV 9.8 6.0 - 12.0 fL    Platelets 270 140 - 450 10*3/mm3    Neutrophil % 61.6 42.7 - 76.0 %    Lymphocyte % 26.2 19.6 - 45.3 %    Monocyte % 8.5 5.0 - 12.0 %    Eosinophil % 2.4 0.3 - 6.2 %    Basophil % 0.9 0.0 - 1.5 %    Immature Grans % 0.4 0.0 - 0.5 %    Neutrophils, Absolute 4.69 1.70 - 7.00 10*3/mm3    Lymphocytes, Absolute 2.00 0.70 - 3.10 10*3/mm3    Monocytes, Absolute 0.65 0.10 - 0.90 10*3/mm3    Eosinophils, Absolute 0.18 0.00 - 0.40 10*3/mm3    Basophils, Absolute 0.07 0.00 - 0.20 10*3/mm3    Immature Grans, Absolute 0.03 0.00 - 0.05 10*3/mm3    nRBC 0.0 0.0 - 0.2 /100 WBC   Hemoglobin A1c   Result Value Ref Range    Hemoglobin A1C 7.40 (H) 4.80 - 5.60 %   Lipid Panel   Result Value Ref Range    Total Cholesterol 239 (H) 0 - 200 mg/dL    Triglycerides 824 (H) 0 - 150 mg/dL    HDL Cholesterol 25 (L) 40 - 60 mg/dL    LDL Cholesterol       VLDL Cholesterol      LDL/HDL Ratio     Comprehensive Metabolic Panel   Result Value Ref Range    Glucose 148 (H) 65 - 99 mg/dL    BUN 16 6 - 20 mg/dL    Creatinine 0.65 (L) 0.76 - 1.27 mg/dL    Sodium 135 (L) 136 - 145 mmol/L    Potassium 3.9 3.5 - 5.2 mmol/L    Chloride 100 98 - 107 mmol/L    CO2 27.0 22.0 - 29.0 mmol/L    Calcium 8.7 8.6 - 10.5 mg/dL    Total Protein 6.7 6.0 - 8.5 g/dL    Albumin 3.90 3.50 - 5.20 g/dL    ALT (SGPT) 31 1 - 41 U/L    AST (SGOT) 18 1 - 40 U/L    Alkaline Phosphatase 67 39 - 117 U/L    Total Bilirubin 0.3 0.2 - 1.2 mg/dL    eGFR Non   Amer 129 >60 mL/min/1.73    Globulin 2.8 gm/dL    A/G Ratio 1.4 g/dL    BUN/Creatinine Ratio 24.6 7.0 - 25.0    Anion Gap 8.0 5.0 - 15.0 mmol/L   Magnesium   Result Value Ref Range    Magnesium 1.7 1.6 - 2.6 mg/dL   Phosphorus   Result Value Ref Range    Phosphorus 3.9 2.5 - 4.5 mg/dL   Calcium, Ionized   Result Value Ref Range    Ionized Calcium 1.23 1.12 - 1.32 mmol/L   Protime-INR   Result Value Ref Range    Protime 12.6 11.2 - 14.3 Seconds    INR 0.99 0.85 - 1.16   CBC Auto Differential   Result Value Ref Range    WBC 6.61 3.40 - 10.80 10*3/mm3    RBC 4.82 4.14 - 5.80 10*6/mm3    Hemoglobin 13.7 13.0 - 17.7 g/dL    Hematocrit 41.2 37.5 - 51.0 %    MCV 85.5 79.0 - 97.0 fL    MCH 28.4 26.6 - 33.0 pg    MCHC 33.3 31.5 - 35.7 g/dL    RDW 12.7 12.3 - 15.4 %    RDW-SD 39.7 37.0 - 54.0 fl    MPV 9.6 6.0 - 12.0 fL    Platelets 247 140 - 450 10*3/mm3    Neutrophil % 62.1 42.7 - 76.0 %    Lymphocyte % 25.4 19.6 - 45.3 %    Monocyte % 7.7 5.0 - 12.0 %    Eosinophil % 3.2 0.3 - 6.2 %    Basophil % 0.8 0.0 - 1.5 %    Immature Grans % 0.8 (H) 0.0 - 0.5 %    Neutrophils, Absolute 4.11 1.70 - 7.00 10*3/mm3    Lymphocytes, Absolute 1.68 0.70 - 3.10 10*3/mm3    Monocytes, Absolute 0.51 0.10 - 0.90 10*3/mm3    Eosinophils, Absolute 0.21 0.00 - 0.40 10*3/mm3    Basophils, Absolute 0.05 0.00 - 0.20 10*3/mm3    Immature Grans, Absolute 0.05 0.00 - 0.05 10*3/mm3    nRBC 0.0 0.0 - 0.2 /100 WBC   LDL Cholesterol, Direct   Result Value Ref Range    LDL Cholesterol  77 0 - 100 mg/dL   Basic Metabolic Panel   Result Value Ref Range    Glucose 162 (H) 65 - 99 mg/dL    BUN 15 6 - 20 mg/dL    Creatinine 0.81 0.76 - 1.27 mg/dL    Sodium 134 (L) 136 - 145 mmol/L    Potassium 4.8 3.5 - 5.2 mmol/L    Chloride 97 (L) 98 - 107 mmol/L    CO2 29.0 22.0 - 29.0 mmol/L    Calcium 9.4 8.6 - 10.5 mg/dL    eGFR Non African Amer 100 >60 mL/min/1.73    BUN/Creatinine Ratio 18.5 7.0 - 25.0    Anion Gap 8.0 5.0 - 15.0 mmol/L   CBC Auto Differential    Result Value Ref Range    WBC 7.54 3.40 - 10.80 10*3/mm3    RBC 5.34 4.14 - 5.80 10*6/mm3    Hemoglobin 15.3 13.0 - 17.7 g/dL    Hematocrit 46.9 37.5 - 51.0 %    MCV 87.8 79.0 - 97.0 fL    MCH 28.7 26.6 - 33.0 pg    MCHC 32.6 31.5 - 35.7 g/dL    RDW 12.8 12.3 - 15.4 %    RDW-SD 40.7 37.0 - 54.0 fl    MPV 9.6 6.0 - 12.0 fL    Platelets 287 140 - 450 10*3/mm3    Neutrophil % 61.5 42.7 - 76.0 %    Lymphocyte % 24.5 19.6 - 45.3 %    Monocyte % 8.8 5.0 - 12.0 %    Eosinophil % 3.2 0.3 - 6.2 %    Basophil % 0.9 0.0 - 1.5 %    Immature Grans % 1.1 (H) 0.0 - 0.5 %    Neutrophils, Absolute 4.64 1.70 - 7.00 10*3/mm3    Lymphocytes, Absolute 1.85 0.70 - 3.10 10*3/mm3    Monocytes, Absolute 0.66 0.10 - 0.90 10*3/mm3    Eosinophils, Absolute 0.24 0.00 - 0.40 10*3/mm3    Basophils, Absolute 0.07 0.00 - 0.20 10*3/mm3    Immature Grans, Absolute 0.08 (H) 0.00 - 0.05 10*3/mm3    nRBC 0.0 0.0 - 0.2 /100 WBC   POC Surgery Labs   Result Value Ref Range    Ionized Calcium 1.23 1.20 - 1.32 mmol/L    POC Potassium 3.7 3.5 - 4.9 mmol/L    Sodium 135 (L) 138 - 146 mmol/L    Total CO2 28 24 - 29 mmol/L    Hemoglobin 14.3 12.0 - 17.0 g/dL    Hematocrit 42 38 - 51 %    pCO2, Arterial 46.5 (H) 35 - 45 mm Hg    pO2, Arterial 43 (L) 80 - 105 mmHg    Base Excess 2.0000 -5 - 5 mmol/L    O2 Saturation, Arterial 77 (L) 95 - 98 %    pH, Arterial 7.37 7.35 - 7.6 pH units    HCO3, Arterial 26.9 (H) 22 - 26 mmol/L   POC Glucose Once   Result Value Ref Range    Glucose 136 (H) 70 - 130 mg/dL   POC Glucose Once   Result Value Ref Range    Glucose 153 (H) 70 - 130 mg/dL   POC Glucose Once   Result Value Ref Range    Glucose 191 (H) 70 - 130 mg/dL   POC Glucose Once   Result Value Ref Range    Glucose 154 (H) 70 - 130 mg/dL   POC Glucose Once   Result Value Ref Range    Glucose 125 70 - 130 mg/dL   POC Glucose Once   Result Value Ref Range    Glucose 195 (H) 70 - 130 mg/dL   POC Glucose Once   Result Value Ref Range    Glucose 155 (H) 70 - 130 mg/dL   POC  Glucose Once   Result Value Ref Range    Glucose 187 (H) 70 - 130 mg/dL   POC Glucose Once   Result Value Ref Range    Glucose 142 (H) 70 - 130 mg/dL   POC Glucose Once   Result Value Ref Range    Glucose 164 (H) 70 - 130 mg/dL   POC Glucose Once   Result Value Ref Range    Glucose 161 (H) 70 - 130 mg/dL   POC Glucose Once   Result Value Ref Range    Glucose 171 (H) 70 - 130 mg/dL   POC Glucose Once   Result Value Ref Range    Glucose 185 (H) 70 - 130 mg/dL   POC Glucose Once   Result Value Ref Range    Glucose 171 (H) 70 - 130 mg/dL   POC Glucose Once   Result Value Ref Range    Glucose 157 (H) 70 - 130 mg/dL   POC Glucose Once   Result Value Ref Range    Glucose 134 (H) 70 - 130 mg/dL   POC Glucose Once   Result Value Ref Range    Glucose 103 70 - 130 mg/dL   POC Glucose Once   Result Value Ref Range    Glucose 180 (H) 70 - 130 mg/dL   POC Glucose Once   Result Value Ref Range    Glucose 149 (H) 70 - 130 mg/dL   Adult Transthoracic Echo Complete W/ Cont if Necessary Per Protocol (With Agitated Saline)   Result Value Ref Range    BSA 2.2 m^2    IVSd 1.1 cm    LVIDd 4.8 cm    LVIDs 3.0 cm    LVPWd 1.0 cm    IVS/LVPW 1.1     FS 37.9 %    EDV(Teich) 106.0 ml    ESV(Teich) 33.9 ml    EF(Teich) 68.0 %    EDV(cubed) 108.5 ml    ESV(cubed) 25.9 ml    EF(cubed) 76.1 %    LV mass(C)d 174.2 grams    LV mass(C)dI 78.4 grams/m^2    SV(Teich) 72.1 ml    SI(Teich) 32.4 ml/m^2    SV(cubed) 82.6 ml    SI(cubed) 37.2 ml/m^2    Ao root diam 3.5 cm    Ao root area 9.6 cm^2    LA dimension 3.5 cm    asc Aorta Diam 3.3 cm    LA/Ao 1.0     LVOT diam 2.2 cm    LVOT area 3.8 cm^2    LVOT area(traced) 3.8 cm^2    LAd major 5.2 cm    LVLd ap4 7.6 cm    EDV(MOD-sp4) 62.0 ml    LVLs ap4 6.6 cm    ESV(MOD-sp4) 29.0 ml    EF(MOD-sp4) 53.2 %    LVLd ap2 7.9 cm    EDV(MOD-sp2) 61.0 ml    LVLs ap2 6.5 cm    ESV(MOD-sp2) 30.0 ml    EF(MOD-sp2) 50.8 %    LA volume 31.0 ml    EF(MOD-bp) 52.0 %    SV(MOD-sp4) 33.0 ml    SI(MOD-sp4) 14.8 ml/m^2     SV(MOD-sp2) 31.0 ml    SI(MOD-sp2) 13.9 ml/m^2    Ao root area (BSA corrected) 1.6     LV Garg Vol (BSA corrected) 27.9 ml/m^2    LV Sys Vol (BSA corrected) 13.0 ml/m^2    LA Volume Index 13.9 ml/m^2    MV E max emir 60.5 cm/sec    MV A max emir 67.9 cm/sec    MV E/A 0.89     MV V2 max 77.1 cm/sec    MV max PG 2.4 mmHg    MV V2 mean 46.9 cm/sec    MV mean PG 1.0 mmHg    MV V2 VTI 17.9 cm    MVA(VTI) 3.2 cm^2    MV dec time 0.25 sec    Ao pk emir 120.0 cm/sec    Ao max PG 5.8 mmHg    Ao max PG (full) 3.4 mmHg    Ao V2 mean 79.3 cm/sec    Ao mean PG 3.0 mmHg    Ao mean PG (full) 2.0 mmHg    Ao V2 VTI 22.8 cm    HERNANDEZ(I,A) 2.5 cm^2    HERNANDEZ(I,D) 2.5 cm^2    HERNANDEZ(V,A) 2.4 cm^2    HERNANDEZ(V,D) 2.4 cm^2    LV V1 max PG 2.4 mmHg    LV V1 mean PG 1.0 mmHg    LV V1 max 77.1 cm/sec    LV V1 mean 50.6 cm/sec    LV V1 VTI 15.1 cm    SV(Ao) 219.4 ml    SI(Ao) 98.7 ml/m^2    SV(LVOT) 57.4 ml    SI(LVOT) 25.8 ml/m^2    PA V2 max 111.0 cm/sec    PA max PG 4.9 mmHg    PA acc slope 318.0 cm/sec^2    PA acc time 0.2 sec    PA pr(Accel) -12.8 mmHg    Lat E/e'  6.3     Med E/e' 9.5     Lat Peak E' Emir 9.6 cm/sec    Med Peak E' Emir 6.4 cm/sec     CV ECHO KODY - BZI_BMI 27.1 kilograms/m^2     CV ECHO KODY - BSA([facility]) 2.2 m^2     CV ECHO KODY - BZI_METRIC_WEIGHT 95.7 kg     CV ECHO KODY - BZI_METRIC_HEIGHT 188.0 cm    Avg E/e' ratio 7.56     BH CV VAS BP LEFT /89 mmHg    TDI S' 9.91 cm/sec    RV Base 3.60 cm    RV Length 7.80 cm    RV Mid 3.40 cm    Ascending aorta 3.30 cm    RAP systole 8 mmHg    TAPSE (>1.6) 1.30 cm2   Adult Transesophageal Echo (DOTTIE) W/ Cont if Necessary Per Protocol   Result Value Ref Range    BSA 2.2 m^2    BH CV ECHO KODY - BZI_BMI 25.7 kilograms/m^2     [facility] 2.[facility] m^2     CV ECHO KODY - BZI_METRIC_WEIGHT 90.7 kg     CV ECHO KODY - BZI_METRIC_HEIGHT 188.0 cm    BH CV VAS BP LEFT /90 mmHg    Echo EF Estimated 55 %   Light Blue Top   Result Value Ref Range    Extra Tube hold for  add-on    Green Top (Gel)   Result Value Ref Range    Extra Tube Hold for add-ons.    Lavender Top   Result Value Ref Range    Extra Tube hold for add-on    Gold Top - SST   Result Value Ref Range    Extra Tube Hold for add-ons.    Bilateral Carotid Duplex   Result Value Ref Range    Prox CCA PSV 94.7 cm/sec    Prox CCA .0 cm/sec    Prox CCA EDV 21.4 cm/sec    Prox CCA EDV 29.1 cm/sec    left Mid CCA PSV 79.0 cm/sec    Right Mid CCA PSV 99.8 cm/sec    left Mid CCA EDV 22.3 cm/sec    right Mid CCA EDV 32.2 cm/sec    Dist CCA PSV 60.8 cm/sec    Dist CCA PSV 69.5 cm/sec    Dist CCA EDV 17.5 cm/sec    Dist CCA EDV 21.6 cm/sec    Prox ECA PSV 82.1 cm/sec    Prox ECA PSV 84.1 cm/sec    Prox ECA EDV 14.4 cm/sec    Prox ECA EDV 15.7 cm/sec    Prox ICA PSV 68.1 cm/sec    Prox ICA PSV 66.4 cm/sec    Prox ICA EDV 29.7 cm/sec    Prox ICA EDV 24.8 cm/sec    Mid ICA PSV 76.0 cm/sec    Mid ICA PSV 75.4 cm/sec    Mid ICA EDV 31.9 cm/sec    Mid ICA EDV 34.6 cm/sec    Dist ICA PSV 71.6 cm/sec    Dist ICA PSV 74.3 cm/sec    Dist ICA EDV 32.3 cm/sec    Dist ICA EDV 35.8 cm/sec    Vertebral A PSV 33.8 cm/sec    Vertebral A PSV 49.9 cm/sec    Vertebral A EDV 13.8 cm/sec    Vertebral A EDV 13.0 cm/sec    Prox SCLA .0 cm/sec    Prox SCLA .0 cm/sec    Prox SCLA EDV 19.6 cm/sec    Prox SCLA EDV 19.6 cm/sec    ICA/CCA ratio 0.76     ICA/CCA ratio 0.96     Right arm /98 mmHg         Assessment/Plan     Diet has improved.    Poor energy so exercise is lacking.    Pt has lost some wt.     Wearing seatbelts.    Recent cva has PFO that was discovered.    Pt has apt to do sleep study at end of the month.    Vaughn was seen today for annual exam.    Diagnoses and all orders for this visit:    Type 2 diabetes mellitus without complication, without long-term current use of insulin (CMS/[facility])  -     Lipid Panel  -     CBC & Differential  -     Vitamin B12  -     Comprehensive Metabolic Panel  -     PSA Screen  -     TSH  -      T4, Free  -     Hemoglobin A1c  -     MicroAlbumin, Urine, Random - Urine, Clean Catch  -     Sedimentation Rate  -     Rheumatoid Factor  -     Cyclic Citrul Peptide Antibody, IgG / IgA  -     E[place]lich[place] Antibody Panel  -     Lyme Disease, PCR  -     [facility] Spotted Fever, IgG  -     [facility] [place] Spotted Fever, IgM  -     Uric Acid  -     C-reactive Protein  -     Antistreptolysin O Titer  -     Protein Elec + Interp, Serum  -     Ambulatory Referral to Ophthalmology    Essential hypertension  -     Lipid Panel  -     CBC & Differential  -     Vitamin B12  -     Comprehensive Metabolic Panel  -     PSA Screen  -     TSH  -     T4, Free  -     Hemoglobin A1c  -     MicroAlbumin, Urine, Random - Urine, Clean Catch  -     Sedimentation Rate  -     Rheumatoid Factor  -     Cyclic Citrul Peptide Antibody, IgG / IgA  -     [place] Antibody Panel  -     Lyme Disease, PCR  -     [facility] Spotted Fever, IgG  -     [facility] [place] Spotted Fever, IgM  -     Uric Acid  -     C-reactive Protein  -     Antistreptolysin O Titer  -     Protein Elec + Interp, Serum    Type 2 diabetes mellitus with other neurologic complication, without long-term current use of insulin (CMS/[facility])  -     Lipid Panel  -     CBC & Differential  -     Vitamin B12  -     Comprehensive Metabolic Panel  -     PSA Screen  -     TSH  -     T4, Free  -     Hemoglobin A1c  -     MicroAlbumin, Urine, Random - Urine, Clean Catch  -     Sedimentation Rate  -     Rheumatoid Factor  -     Cyclic Citrul Peptide Antibody, IgG / IgA  -     [place] Antibody Panel  -     Lyme Disease, PCR  -     [facility] Spotted Fever, IgG  -     [facility] [place] Spotted Fever, IgM  -     Uric Acid  -     C-reactive Protein  -     Antistreptolysin O Titer  -     Protein Elec + Interp, Serum  -     Ambulatory Referral to Ophthalmology    Routine general medical examination at a health care facility  -     Lipid Panel  -     CBC & Differential  -     Vitamin B12  -     Comprehensive  Metabolic Panel  -     PSA Screen  -     TSH  -     T4, Free  -     Hemoglobin A1c  -     MicroAlbumin, Urine, Random - Urine, Clean Catch  -     Sedimentation Rate  -     Rheumatoid Factor  -     Cyclic Citrul Peptide Antibody, IgG / IgA  -     [place] Antibody Panel  -     Lyme Disease, PCR  -     [facility] Spotted Fever, IgG  -     [facility] [place] Spotted Fever, IgM  -     Uric Acid  -     C-reactive Protein  -     Antistreptolysin O Titer  -     Protein Elec + Interp, Serum  -     Ambulatory Referral to Ophthalmology    Fatigue, unspecified type  -     Lipid Panel  -     CBC & Differential  -     Vitamin B12  -     Comprehensive Metabolic Panel  -     PSA Screen  -     TSH  -     T4, Free  -     Hemoglobin A1c  -     MicroAlbumin, Urine, Random - Urine, Clean Catch  -     Sedimentation Rate  -     Rheumatoid Factor  -     Cyclic Citrul Peptide Antibody, IgG / IgA  -     [place] Antibody Panel  -     Lyme Disease, PCR  -     [facility] Spotted Fever, IgG  -     [facility] [place] Spotted Fever, IgM  -     Uric Acid  -     C-reactive Protein  -     Antistreptolysin O Titer  -     Protein Elec + Interp, Serum    Arthritis  -     Lipid Panel  -     CBC & Differential  -     Vitamin B12  -     Comprehensive Metabolic Panel  -     PSA Screen  -     TSH  -     T4, Free  -     Hemoglobin A1c  -     MicroAlbumin, Urine, Random - Urine, Clean Catch  -     Sedimentation Rate  -     Rheumatoid Factor  -     Cyclic Citrul Peptide Antibody, IgG / IgA  -     [place] Antibody Panel  -     Lyme Disease, PCR  -     [facility] Spotted Fever, IgG  -     [facility] [place] Spotted Fever, IgM  -     Uric Acid  -     C-reactive Protein  -     Antistreptolysin O Titer  -     Protein Elec + Interp, Serum      Return in about 6 weeks (around 3/4/2020).          There are no Patient Instructions on file for this visit.     Sen Nichole MD    Assessment/Plan

## 2020-01-24 LAB
A PHAGOCYTOPH IGG TITR SER IF: NEGATIVE {TITER}
A PHAGOCYTOPH IGM TITR SER IF: NEGATIVE {TITER}
ALBUMIN SERPL ELPH-MCNC: 4.1 G/DL (ref 2.9–4.4)
ALBUMIN SERPL-MCNC: 5.2 G/DL (ref 3.5–5.2)
ALBUMIN/GLOB SERPL: 1.2 {RATIO} (ref 0.7–1.7)
ALBUMIN/GLOB SERPL: 2.2 G/DL
ALP SERPL-CCNC: 87 U/L (ref 39–117)
ALPHA1 GLOB SERPL ELPH-MCNC: 0.2 G/DL (ref 0–0.4)
ALPHA2 GLOB SERPL ELPH-MCNC: 0.8 G/DL (ref 0.4–1)
ALT SERPL-CCNC: 22 U/L (ref 1–41)
ASO AB SERPL-ACNC: <20 IU/ML (ref 0–200)
AST SERPL-CCNC: 18 U/L (ref 1–40)
B BURGDOR DNA SPEC QL NAA+PROBE: NEGATIVE
B-GLOBULIN SERPL ELPH-MCNC: 1.6 G/DL (ref 0.7–1.3)
BASOPHILS # BLD AUTO: 0.08 10*3/MM3 (ref 0–0.2)
BASOPHILS NFR BLD AUTO: 1.1 % (ref 0–1.5)
BILIRUB SERPL-MCNC: 0.4 MG/DL (ref 0.2–1.2)
BUN SERPL-MCNC: 13 MG/DL (ref 6–20)
BUN/CREAT SERPL: 16.5 (ref 7–25)
CALCIUM SERPL-MCNC: 9.9 MG/DL (ref 8.6–10.5)
CCP IGA+IGG SERPL IA-ACNC: 7 UNITS (ref 0–19)
CHLORIDE SERPL-SCNC: 93 MMOL/L (ref 98–107)
CHOLEST SERPL-MCNC: 202 MG/DL (ref 0–200)
CO2 SERPL-SCNC: 27.5 MMOL/L (ref 22–29)
CREAT SERPL-MCNC: 0.79 MG/DL (ref 0.76–1.27)
CRP SERPL-MCNC: 0.26 MG/DL (ref 0–0.5)
E CHAFFEENSIS IGG TITR SER IF: NEGATIVE {TITER}
E CHAFFEENSIS IGM TITR SER IF: NEGATIVE {TITER}
EOSINOPHIL # BLD AUTO: 0.15 10*3/MM3 (ref 0–0.4)
EOSINOPHIL NFR BLD AUTO: 2.1 % (ref 0.3–6.2)
ERYTHROCYTE [DISTWIDTH] IN BLOOD BY AUTOMATED COUNT: 12.6 % (ref 12.3–15.4)
ERYTHROCYTE [SEDIMENTATION RATE] IN BLOOD BY WESTERGREN METHOD: 1 MM/HR (ref 0–20)
GAMMA GLOB SERPL ELPH-MCNC: 0.9 G/DL (ref 0.4–1.8)
GLOBULIN SER CALC-MCNC: 2.4 GM/DL
GLOBULIN SER CALC-MCNC: 3.5 G/DL (ref 2.2–3.9)
GLUCOSE SERPL-MCNC: 126 MG/DL (ref 65–99)
HBA1C MFR BLD: 6.7 % (ref 4.8–5.6)
HCT VFR BLD AUTO: 45.9 % (ref 37.5–51)
HDLC SERPL-MCNC: 38 MG/DL (ref 40–60)
HGB BLD-MCNC: 15.7 G/DL (ref 13–17.7)
IMM GRANULOCYTES # BLD AUTO: 0.04 10*3/MM3 (ref 0–0.05)
IMM GRANULOCYTES NFR BLD AUTO: 0.6 % (ref 0–0.5)
LABORATORY COMMENT REPORT: ABNORMAL
LDLC SERPL CALC-MCNC: 90 MG/DL (ref 0–100)
LYMPHOCYTES # BLD AUTO: 1.58 10*3/MM3 (ref 0.7–3.1)
LYMPHOCYTES NFR BLD AUTO: 22.1 % (ref 19.6–45.3)
M PROTEIN SERPL ELPH-MCNC: ABNORMAL G/DL
MCH RBC QN AUTO: 28.9 PG (ref 26.6–33)
MCHC RBC AUTO-ENTMCNC: 34.2 G/DL (ref 31.5–35.7)
MCV RBC AUTO: 84.4 FL (ref 79–97)
MICROALBUMIN UR-MCNC: 27.6 UG/ML
MONOCYTES # BLD AUTO: 0.56 10*3/MM3 (ref 0.1–0.9)
MONOCYTES NFR BLD AUTO: 7.8 % (ref 5–12)
NEUTROPHILS # BLD AUTO: 4.75 10*3/MM3 (ref 1.7–7)
NEUTROPHILS NFR BLD AUTO: 66.3 % (ref 42.7–76)
NRBC BLD AUTO-RTO: 0 /100 WBC (ref 0–0.2)
PLATELET # BLD AUTO: 315 10*3/MM3 (ref 140–450)
POTASSIUM SERPL-SCNC: 4.7 MMOL/L (ref 3.5–5.2)
PROT PATTERN SERPL ELPH-IMP: ABNORMAL
PROT SERPL-MCNC: 7.6 G/DL (ref 6–8.5)
PSA SERPL-MCNC: 1.22 NG/ML (ref 0–4)
R RICKETTSI IGG SER QL IA: NEGATIVE
R RICKETTSI IGM SER-ACNC: 0.47 INDEX (ref 0–0.89)
RBC # BLD AUTO: 5.44 10*6/MM3 (ref 4.14–5.8)
RHEUMATOID FACT SERPL-ACNC: <10 IU/ML (ref 0–13.9)
SODIUM SERPL-SCNC: 136 MMOL/L (ref 136–145)
T4 FREE SERPL-MCNC: 1.38 NG/DL (ref 0.93–1.7)
TRIGL SERPL-MCNC: 369 MG/DL (ref 0–150)
TSH SERPL DL<=0.005 MIU/L-ACNC: 1.28 UIU/ML (ref 0.27–4.2)
URATE SERPL-MCNC: 6.1 MG/DL (ref 3.4–7)
VIT B12 SERPL-MCNC: 410 PG/ML (ref 211–946)
VLDLC SERPL CALC-MCNC: 73.8 MG/DL
WBC # BLD AUTO: 7.16 10*3/MM3 (ref 3.4–10.8)

## 2020-01-24 NOTE — PROGRESS NOTES
Please let them know the DM is stable ha1c 6.7. Serum protein electrophoresis with elevated beta globulin. Not sure significance.  Will have to monitor.   Other labs look good.   Will discuss details on rtc.

## 2020-01-29 ENCOUNTER — HOSPITAL ENCOUNTER (OUTPATIENT)
Dept: SLEEP MEDICINE | Facility: HOSPITAL | Age: 55
Discharge: HOME | End: 2020-01-29
Admitting: INTERNAL MEDICINE

## 2020-01-29 DIAGNOSIS — Z86.73 RECENT CEREBROVASCULAR ACCIDENT (CVA): ICD-10-CM

## 2020-01-29 DIAGNOSIS — G47.19 EXCESSIVE DAYTIME SLEEPINESS: ICD-10-CM

## 2020-01-29 DIAGNOSIS — G47.33 OBSTRUCTIVE SLEEP APNEA: ICD-10-CM

## 2020-01-29 DIAGNOSIS — R06.83 SNORING: ICD-10-CM

## 2020-01-29 PROCEDURE — 95810 POLYSOM 6/> YRS 4/> PARAM: CPT

## 2020-01-29 PROCEDURE — 95810 POLYSOM 6/> YRS 4/> PARAM: CPT | Performed by: INTERNAL MEDICINE

## 2020-03-05 ENCOUNTER — OFFICE VISIT (OUTPATIENT)
Dept: INTERNAL MEDICINE | Facility: CLINIC | Age: 55
End: 2020-03-05

## 2020-03-05 VITALS
HEART RATE: 84 BPM | SYSTOLIC BLOOD PRESSURE: 124 MMHG | WEIGHT: 201 LBS | HEIGHT: 74 IN | RESPIRATION RATE: 16 BRPM | TEMPERATURE: 98.6 F | DIASTOLIC BLOOD PRESSURE: 75 MMHG | OXYGEN SATURATION: 98 % | BODY MASS INDEX: 25.8 KG/M2

## 2020-03-05 DIAGNOSIS — I10 ESSENTIAL HYPERTENSION: ICD-10-CM

## 2020-03-05 DIAGNOSIS — Z12.5 PROSTATE CANCER SCREENING: ICD-10-CM

## 2020-03-05 DIAGNOSIS — E11.49 TYPE 2 DIABETES MELLITUS WITH OTHER NEUROLOGIC COMPLICATION, WITHOUT LONG-TERM CURRENT USE OF INSULIN (HCC): ICD-10-CM

## 2020-03-05 DIAGNOSIS — J44.9 CHRONIC OBSTRUCTIVE PULMONARY DISEASE, UNSPECIFIED COPD TYPE (HCC): ICD-10-CM

## 2020-03-05 DIAGNOSIS — E78.00 PURE HYPERCHOLESTEROLEMIA: Primary | ICD-10-CM

## 2020-03-05 PROCEDURE — 99214 OFFICE O/P EST MOD 30 MIN: CPT | Performed by: INTERNAL MEDICINE

## 2020-03-05 RX ORDER — METFORMIN HYDROCHLORIDE 500 MG/1
500 TABLET, EXTENDED RELEASE ORAL 2 TIMES DAILY WITH MEALS
Qty: 180 TABLET | Refills: 3 | Status: SHIPPED | OUTPATIENT
Start: 2020-03-05 | End: 2021-02-10

## 2020-03-05 RX ORDER — LISINOPRIL AND HYDROCHLOROTHIAZIDE 20; 12.5 MG/1; MG/1
1 TABLET ORAL 2 TIMES DAILY
Qty: 180 TABLET | Refills: 3 | Status: SHIPPED | OUTPATIENT
Start: 2020-03-05 | End: 2021-04-23

## 2020-03-05 NOTE — PROGRESS NOTES
Subjective     Patient ID: Vaughn Huynh is a 54 y.o. male. Patient is here for management of multiple medical problems.     Chief Complaint   Patient presents with   • Diabetes     follow-up     Diabetes   He presents for his follow-up diabetic visit. He has type 2 diabetes mellitus. His disease course has been stable. Pertinent negatives for hypoglycemia include no confusion, dizziness, headaches or hunger. Pertinent negatives for diabetes include no blurred vision, no chest pain, no fatigue and no foot paresthesias. Pertinent negatives for hypoglycemia complications include no blackouts and no hospitalization. Symptoms are stable. Pertinent negatives for diabetic complications include no autonomic neuropathy, CVA or heart disease. There are no known risk factors for coronary artery disease. He has had a previous visit with a dietitian. He participates in exercise daily. There is no change in his home blood glucose trend. An ACE inhibitor/angiotensin II receptor blocker is being taken.              The following portions of the patient's history were reviewed and updated as appropriate: allergies, current medications, past family history, past medical history, past social history, past surgical history and problem list.    Review of Systems   Constitutional: Negative for fatigue.   Eyes: Negative for blurred vision.   Cardiovascular: Negative for chest pain.   Neurological: Negative for dizziness and headaches.   Psychiatric/Behavioral: Negative for confusion.       Current Outpatient Medications:   •  apixaban (ELIQUIS) 5 MG tablet tablet, Take 1 tablet by mouth Every 12 (Twelve) Hours. Indications: Atrial Fibrillation, Disp: 180 tablet, Rfl: 3  •  aspirin 81 MG tablet, Take  by mouth daily., Disp: , Rfl:   •  esomeprazole (nexIUM) 40 MG capsule, Take 40 mg by mouth Every Morning Before Breakfast., Disp: , Rfl:   •  glucose blood test strip, E11.9 Use what is covered by insurance, Disp: 100 each, Rfl: 12  •   "lisinopril-hydrochlorothiazide (PRINZIDE,ZESTORETIC) 20-12.5 MG per tablet, Take 1 tablet by mouth 2 (Two) Times a Day., Disp: 180 tablet, Rfl: 3  •  metFORMIN ER (GLUCOPHAGE-XR) 500 MG 24 hr tablet, Take 1 tablet by mouth 2 (Two) Times a Day With Meals., Disp: 180 tablet, Rfl: 3  •  ONE TOUCH LANCETS misc, E11.9 use what is covered by insurance., Disp: 100 each, Rfl: 12  •  rosuvastatin (CRESTOR) 10 MG tablet, Take 1 tablet by mouth Every Night., Disp: 30 tablet, Rfl: 11    Objective      Blood pressure 124/75, pulse 84, temperature 98.6 °F (37 °C), temperature source Oral, resp. rate 16, height 188 cm (74\"), weight 91.2 kg (201 lb), SpO2 98 %.    Physical Exam     General Appearance:    Alert, cooperative, no distress, appears stated age   Head:    Normocephalic, without obvious abnormality, atraumatic   Eyes:    PERRL, conjunctiva/corneas clear, EOM's intact   Ears:    Normal TM's and external ear canals, both ears   Nose:   Nares normal, septum midline, mucosa normal, no drainage   or sinus tenderness   Throat:   Lips, mucosa, and tongue normal; teeth and gums normal   Neck:   Supple, symmetrical, trachea midline, no adenopathy;        thyroid:  No enlargement/tenderness/nodules; no carotid    bruit or JVD   Back:     Symmetric, no curvature, ROM normal, no CVA tenderness   Lungs:     Clear to auscultation bilaterally, respirations unlabored   Chest wall:    No tenderness or deformity   Heart:    Regular rate and rhythm, S1 and S2 normal, no murmur,        rub or gallop   Abdomen:     Soft, non-tender, bowel sounds active all four quadrants,     no masses, no organomegaly   Extremities:   Extremities normal, atraumatic, no cyanosis or edema   Pulses:   2+ and symmetric all extremities   Skin:   Skin color, texture, turgor normal, no rashes or lesions   Lymph nodes:   Cervical, supraclavicular, and axillary nodes normal   Neurologic:   CNII-XII intact. Normal strength, sensation and reflexes       throughout "      Results for orders placed or performed in visit on 01/22/20   Lipid Panel   Result Value Ref Range    Total Cholesterol 202 (H) 0 - 200 mg/dL    Triglycerides 369 (H) 0 - 150 mg/dL    HDL Cholesterol 38 (L) 40 - 60 mg/dL    VLDL Cholesterol 73.8 mg/dL    LDL Cholesterol  90 0 - 100 mg/dL   Vitamin B12   Result Value Ref Range    Vitamin B-12 410 211 - 946 pg/mL   Comprehensive Metabolic Panel   Result Value Ref Range    Glucose 126 (H) 65 - 99 mg/dL    BUN 13 6 - 20 mg/dL    Creatinine 0.79 0.76 - 1.27 mg/dL    eGFR Non African Am 102 >60 mL/min/1.73    eGFR African Am 124 >60 mL/min/1.73    BUN/Creatinine Ratio 16.5 7.0 - 25.0    Sodium 136 136 - 145 mmol/L    Potassium 4.7 3.5 - 5.2 mmol/L    Chloride 93 (L) 98 - 107 mmol/L    Total CO2 27.5 22.0 - 29.0 mmol/L    Calcium 9.9 8.6 - 10.5 mg/dL    Total Protein 7.6 6.0 - 8.5 g/dL    Albumin 5.20 3.50 - 5.20 g/dL    Globulin 2.4 gm/dL    A/G Ratio 2.2 g/dL    Total Bilirubin 0.4 0.2 - 1.2 mg/dL    Alkaline Phosphatase 87 39 - 117 U/L    AST (SGOT) 18 1 - 40 U/L    ALT (SGPT) 22 1 - 41 U/L   PSA Screen   Result Value Ref Range    PSA 1.220 0.000 - 4.000 ng/mL   TSH   Result Value Ref Range    TSH 1.280 0.270 - 4.200 uIU/mL   T4, Free   Result Value Ref Range    Free T4 1.38 0.93 - 1.70 ng/dL   Hemoglobin A1c   Result Value Ref Range    Hemoglobin A1C 6.70 (H) 4.80 - 5.60 %   MicroAlbumin, Urine, Random - Urine, Clean Catch   Result Value Ref Range    Microalbumin, Urine 27.6 Not Estab. ug/mL   Sedimentation Rate   Result Value Ref Range    Sed Rate 1 0 - 20 mm/hr   Rheumatoid Factor   Result Value Ref Range    RA Latex Turbid <10.0 0.0 - 13.9 IU/mL   Cyclic Citrul Peptide Antibody, IgG / IgA   Result Value Ref Range    CCP Antibodies IgG/IgA 7 0 - 19 units   Ehrlichia Antibody Panel   Result Value Ref Range    E. chaffeensis (HME) IgG Titer Negative Neg:<1:64    E. chaffeensis ([facility]ME) IgM Titer Negative Neg:<1:20    HGE IgG Titer Negative Neg:<1:64    HGE IgM Titer  Negative Neg:<1:20   Lyme Disease, PCR   Result Value Ref Range    Lyme Disease(B.burgdorferi)PCR Negative Negative   [facility] Spotted Fever, IgG   Result Value Ref Range    RMSF IgG Negative Negative   [facility] Spotted Fever, IgM   Result Value Ref Range    RMSF IgM 0.47 0.00 - 0.89 index   Uric Acid   Result Value Ref Range    Uric Acid 6.1 3.4 - 7.0 mg/dL   C-reactive Protein   Result Value Ref Range    C-Reactive Protein 0.26 0.00 - 0.50 mg/dL   Antistreptolysin O Titer   Result Value Ref Range    ASO <20.0 0.0 - 200.0 IU/mL   Protein Elec + Interp, Serum   Result Value Ref Range    Albumin 4.1 2.9 - 4.4 g/dL    Alpha-1-Globulin 0.2 0.0 - 0.4 g/dL    Alpha-2-Globulin 0.8 0.4 - 1.0 g/dL    Beta Globulin 1.6 (H) 0.7 - 1.3 g/dL    Gamma Globulin 0.9 0.4 - 1.8 g/dL    M-Frankie Not Observed Not Observed g/dL    Globulin 3.5 2.2 - 3.9 g/dL    A/G Ratio 1.2 0.7 - 1.7    Please note Comment     SPE Interpretation Comment    CBC & Differential   Result Value Ref Range    WBC 7.16 3.40 - 10.80 10*3/mm3    RBC 5.44 4.14 - 5.80 10*6/mm3    Hemoglobin 15.7 13.0 - 17.7 g/dL    Hematocrit 45.9 37.5 - 51.0 %    MCV 84.4 79.0 - 97.0 fL    MCH 28.9 26.6 - 33.0 pg    MCHC 34.2 31.5 - 35.7 g/dL    RDW 12.6 12.3 - 15.4 %    Platelets 315 140 - 450 10*3/mm3    Neutrophil Rel % 66.3 42.7 - 76.0 %    Lymphocyte Rel % 22.1 19.6 - 45.3 %    Monocyte Rel % 7.8 5.0 - 12.0 %    Eosinophil Rel % 2.1 0.3 - 6.2 %    Basophil Rel % 1.1 0.0 - 1.5 %    Neutrophils Absolute 4.75 1.70 - 7.00 10*3/mm3    Lymphocytes Absolute 1.58 0.70 - 3.10 10*3/mm3    Monocytes Absolute 0.56 0.10 - 0.90 10*3/mm3    Eosinophils Absolute 0.15 0.00 - 0.40 10*3/mm3    Basophils Absolute 0.08 0.00 - 0.20 10*3/mm3    Immature Granulocyte Rel % 0.6 (H) 0.0 - 0.5 %    Immature Grans Absolute 0.04 0.00 - 0.05 10*3/mm3    nRBC 0.0 0.0 - 0.2 /100 WBC         Assessment/Plan   Diet has gotten better.   ha1c down/.    Decreased soda to 2  A week.      Right knee  pain.  Seen Dr Thakkar. Told he needed TKR.  Had gel inj.    Still snorring.  Neg for antony on sleep study.  Pulmonic murmur      Vaughn was seen today for diabetes.    Diagnoses and all orders for this visit:    Pure hypercholesterolemia  -     Vitamin B12  -     CBC & Differential  -     Lipid Panel  -     Comprehensive Metabolic Panel  -     TSH  -     T4, Free  -     PSA Screen  -     Hemoglobin A1c  -     MicroAlbumin, Urine, Random - Urine, Clean Catch    Essential hypertension  -     lisinopril-hydrochlorothiazide (PRINZIDE,ZESTORETIC) 20-12.5 MG per tablet; Take 1 tablet by mouth 2 (Two) Times a Day.  -     Vitamin B12  -     CBC & Differential  -     Lipid Panel  -     Comprehensive Metabolic Panel  -     TSH  -     T4, Free  -     PSA Screen  -     Hemoglobin A1c  -     MicroAlbumin, Urine, Random - Urine, Clean Catch    Type 2 diabetes mellitus with other neurologic complication, without long-term current use of insulin (CMS/[facility])  -     Vitamin B12  -     CBC & Differential  -     Lipid Panel  -     Comprehensive Metabolic Panel  -     TSH  -     T4, Free  -     PSA Screen  -     Hemoglobin A1c  -     MicroAlbumin, Urine, Random - Urine, Clean Catch    Chronic obstructive pulmonary disease, unspecified COPD type (CMS/HCC)  -     Overnight Sleep Oximetry Study; Future  -     Vitamin B12  -     CBC & Differential  -     Lipid Panel  -     Comprehensive Metabolic Panel  -     TSH  -     T4, Free  -     PSA Screen  -     Hemoglobin A1c  -     MicroAlbumin, Urine, Random - Urine, Clean Catch    Prostate cancer screening  -     PSA Screen    Other orders  -     metFORMIN ER (GLUCOPHAGE-XR) 500 MG 24 hr tablet; Take 1 tablet by mouth 2 (Two) Times a Day With Meals.  -     apixaban (ELIQUIS) 5 MG tablet tablet; Take 1 tablet by mouth Every 12 (Twelve) Hours. Indications: Atrial Fibrillation      Return in about 4 months (around 7/5/2020).          There are no Patient Instructions on file for this visit.     Sen  SHOSHANA Nichole MD    Assessment/Plan

## 2020-06-29 ENCOUNTER — OFFICE VISIT (OUTPATIENT)
Dept: NEUROLOGY | Facility: CLINIC | Age: 55
End: 2020-06-29

## 2020-06-29 VITALS
HEIGHT: 74 IN | SYSTOLIC BLOOD PRESSURE: 108 MMHG | OXYGEN SATURATION: 98 % | HEART RATE: 92 BPM | WEIGHT: 204 LBS | DIASTOLIC BLOOD PRESSURE: 74 MMHG | BODY MASS INDEX: 26.18 KG/M2

## 2020-06-29 DIAGNOSIS — I63.81 THALAMIC STROKE (HCC): Primary | ICD-10-CM

## 2020-06-29 PROCEDURE — 99214 OFFICE O/P EST MOD 30 MIN: CPT | Performed by: PSYCHIATRY & NEUROLOGY

## 2020-06-29 NOTE — PROGRESS NOTES
Subjective:    CC: Vaughn Huynh is in clinic today for follow up for right thalamic stroke.    HPI:  Initial visit: 12/17/2019: Mr. Shook is 54-year-old male with past medical history of type 2 diabetes, hyperlipidemia and hypertension was admitted to the hospital in October 2019.  He presented to the hospital with diplopia, slurred speech, confusion and left-sided weakness.  He was within the window and there were no contraindication for IV TPA administration so he received IV TPA.   MRI was done which showed tiny 1 cm size right thalamic stroke without any involvement of internal capsule.  The symptoms that he presented with improved within 24 hours and he was back to his baseline.  He underwent CT angiogram of brain and neck which did not reveal any flow-limiting stenosis or aneurysm.  2D echocardiogram and DOTTIE were performed as well which both showed presence of small PFO.  Cardiology saw the patient and it was decided to start him on anticoagulation because of possible embolic etiology of stroke.  Since that discharge, he has not had any new focal neurological signs or symptoms.  He has had 72-hour Holter monitoring done which did not reveal any evidence of A. fib or a flutter as well.  He continues to take aspirin 81 mg daily, Eliquis 5 mg twice daily.    6/29/2020: He is in clinic for regular follow-up.  Since his last visit in January 2020, he has not had any new focal neurological signs or symptoms.  He does not have any residual left hemibody symptoms.  He has continued taking Eliquis, aspirin and Crestor for secondary stroke prevention.  He has had cardiology follow-up and the recommendation was to continue Eliquis for secondary stroke prevention.  He is trying to keep his blood sugars under control and currently takes metformin for diabetes.    The following portions of the patient's history were reviewed and updated as of 06/29/2020: allergies, social history and problem list.       Current  Outpatient Medications:   •  apixaban (ELIQUIS) 5 MG tablet tablet, Take 1 tablet by mouth Every 12 (Twelve) Hours. Indications: Atrial Fibrillation, Disp: 180 tablet, Rfl: 3  •  aspirin 81 MG tablet, Take  by mouth daily., Disp: , Rfl:   •  glucose blood test strip, E11.9 Use what is covered by insurance, Disp: 100 each, Rfl: 12  •  lisinopril-hydrochlorothiazide (PRINZIDE,ZESTORETIC) 20-12.5 MG per tablet, Take 1 tablet by mouth 2 (Two) Times a Day., Disp: 180 tablet, Rfl: 3  •  metFORMIN ER (GLUCOPHAGE-XR) 500 MG 24 hr tablet, Take 1 tablet by mouth 2 (Two) Times a Day With Meals., Disp: 180 tablet, Rfl: 3  •  ONE TOUCH LANCETS misc, E11.9 use what is covered by insurance., Disp: 100 each, Rfl: 12  •  rosuvastatin (CRESTOR) 10 MG tablet, Take 1 tablet by mouth Every Night., Disp: 30 tablet, Rfl: 11  •  esomeprazole (nexIUM) 40 MG capsule, Take 40 mg by mouth Every Morning Before Breakfast., Disp: , Rfl:    Past Medical History:   Diagnosis Date   • Adult BMI 27.0-27.9 kg/sq m    • Cerebrovascular accident (CVA) (CMS/HCC) 10/11/2019   • Diabetes mellitus (CMS/HCC)    • Elevated cholesterol    • Esophagitis    • GERD (gastroesophageal reflux disease)    • Heartburn    • Hyperlipidemia 7/18/2016   • Hypertension 7/18/2016   • Knee pain     right   • Myopathy 7/18/2016   • Osteoarthritis 07/18/2016    right knee      Past Surgical History:   Procedure Laterality Date   • BACK SURGERY  2012      Family History   Problem Relation Age of Onset   • Cancer Mother         kindney   • Dementia Father    • Hypertension Father    • Diabetes Father    • Arthritis Father    • Hyperlipidemia Father    • Seizures Other    • Cancer Other    • Diabetes Other    • Hypertension Other    • Arthritis Other    • Hypertension Other    • Colon cancer Neg Hx    • Liver cancer Neg Hx    • Liver disease Neg Hx    • Esophageal cancer Neg Hx    • Stomach cancer Neg Hx         Review of Systems   Eyes: Positive for redness.   Musculoskeletal:  "Positive for myalgias.   All other systems reviewed and are negative.    Objective:    /74   Pulse 92   Ht 188 cm (74\")   Wt 92.5 kg (204 lb)   SpO2 98%   BMI 26.19 kg/m²     Neurology Exam:  General apperance: NAD.     Mental status: Alert, awake and oriented to time place and person.    Recent and Remote memory: Can recall 3/3 objects at 5 minutes. Can recall historical events.     Attention span and Concentration: Serial 7s: Normal.     Fund of knowledge:  Normal.     Language and Speech: No aphasia or dysarthria.    Naming , Repitition and Comprehension:  Can name objects, repeat a sentence and follow commands. Speech is clear and fluent with good repetition, comprehension, and naming.    CN II to XII: Intact.    Opthalmoscopic Exam: No papilledema.    Motor:  Right UE muscle strength 5/5. Normal tone.     Left UE muscle strength 5/5. Normal tone.      Right LE muscle strength5/5. Normal tone.     Left LE muscle strength 5/5. Normal tone.      Sensory: Normal light touch, vibration and pinprick sensation bilaterally.    DTRs: 2+ bilaterally.    Babinski: Negative bilaterally.    Co-ordination: Normal finger-to-nose, heel to shin B/L.    Rhomberg: Negative.    Gait: Normal.    Cardiovascular: Regular rate and rhythm without murmur, gallop or rub.    Assessment and Plan:  1. Thalamic stroke (CMS/HCC)  Patient with history of tiny right thalamic lacunar stroke without any residual left hemibody symptoms.  Since his last visit in December, he has not had any new focal neurological signs or symptoms.  Continue with Eliquis, aspirin, Crestor and strict glycemic control for secondary stroke prevention and I will see him back in 1 year for follow-up.  I have advised him to also continue to follow-up with cardiology on a regular basis.       I spent 25 minutes face to face with the patient and spent more than 50% of this time  in management, instructions and education regarding above mentioned diagnosis and " also on counseling and discussing about taking medication regularly, possible side effects with medication use, importance of good sleep hygiene, good hydration and regular exercise.    Return in about 1 year (around 6/29/2021).

## 2020-07-07 ENCOUNTER — OFFICE VISIT (OUTPATIENT)
Dept: INTERNAL MEDICINE | Facility: CLINIC | Age: 55
End: 2020-07-07

## 2020-07-07 VITALS
SYSTOLIC BLOOD PRESSURE: 130 MMHG | BODY MASS INDEX: 26.41 KG/M2 | WEIGHT: 205.8 LBS | DIASTOLIC BLOOD PRESSURE: 80 MMHG | HEART RATE: 83 BPM | TEMPERATURE: 97.3 F | HEIGHT: 74 IN | OXYGEN SATURATION: 96 %

## 2020-07-07 DIAGNOSIS — G89.29 CHRONIC PAIN OF RIGHT KNEE: Primary | ICD-10-CM

## 2020-07-07 DIAGNOSIS — M25.561 CHRONIC PAIN OF RIGHT KNEE: Primary | ICD-10-CM

## 2020-07-07 DIAGNOSIS — R77.8 ABNORMAL SERUM PROTEIN ELECTROPHORESIS: ICD-10-CM

## 2020-07-07 PROCEDURE — 99214 OFFICE O/P EST MOD 30 MIN: CPT | Performed by: INTERNAL MEDICINE

## 2020-07-07 NOTE — PROGRESS NOTES
Subjective     Patient ID: Vaughn Huynh is a 54 y.o. male. Patient is here for management of multiple medical problems.     Chief Complaint   Patient presents with   • Diabetes     right knee pain      Diabetes   He presents for his follow-up diabetic visit. He has type 2 diabetes mellitus. His disease course has been stable. Pertinent negatives for hypoglycemia include no confusion or headaches. Pertinent negatives for diabetes include no blurred vision and no foot paresthesias. Pertinent negatives for hypoglycemia complications include no blackouts and no hospitalization. Symptoms are stable. Pertinent negatives for diabetic complications include no autonomic neuropathy or CVA. There are no known risk factors for coronary artery disease. He is compliant with treatment all of the time. His weight is stable. He participates in exercise daily. There is no change in his home blood glucose trend. An ACE inhibitor/angiotensin II receptor blocker is being taken. Eye exam is current.      BS a bit higher.  130  Diet not as good.    Right knee pain.   Told bone on bone.  Had 4 inj.    Drives all day.           The following portions of the patient's history were reviewed and updated as appropriate: allergies, current medications, past family history, past medical history, past social history, past surgical history and problem list.    Review of Systems   Eyes: Negative for blurred vision.   Neurological: Negative for headaches.   Psychiatric/Behavioral: Negative for confusion.       Current Outpatient Medications:   •  apixaban (ELIQUIS) 5 MG tablet tablet, Take 1 tablet by mouth Every 12 (Twelve) Hours. Indications: Atrial Fibrillation, Disp: 180 tablet, Rfl: 3  •  aspirin 81 MG tablet, Take  by mouth daily., Disp: , Rfl:   •  esomeprazole (nexIUM) 40 MG capsule, Take 40 mg by mouth Every Morning Before Breakfast., Disp: , Rfl:   •  glucose blood test strip, E11.9 Use what is covered by insurance, Disp: 100 each, Rfl:  "12  •  lisinopril-hydrochlorothiazide (PRINZIDE,ZESTORETIC) 20-12.5 MG per tablet, Take 1 tablet by mouth 2 (Two) Times a Day., Disp: 180 tablet, Rfl: 3  •  metFORMIN ER (GLUCOPHAGE-XR) 500 MG 24 hr tablet, Take 1 tablet by mouth 2 (Two) Times a Day With Meals., Disp: 180 tablet, Rfl: 3  •  ONE TOUCH LANCETS misc, E11.9 use what is covered by insurance., Disp: 100 each, Rfl: 12  •  rosuvastatin (CRESTOR) 10 MG tablet, Take 1 tablet by mouth Every Night., Disp: 30 tablet, Rfl: 11    Objective      Blood pressure 130/80, pulse 83, temperature 97.3 °F (36.3 °C), temperature source Temporal, height 188 cm (74\"), weight 93.4 kg (205 lb 12.8 oz), SpO2 96 %.    Physical Exam     General Appearance:    Alert, cooperative, no distress, appears stated age   Head:    Normocephalic, without obvious abnormality, atraumatic   Eyes:    PERRL, conjunctiva/corneas clear, EOM's intact   Ears:    Normal TM's and external ear canals, both ears   Nose:   Nares normal, septum midline, mucosa normal, no drainage   or sinus tenderness   Throat:   Lips, mucosa, and tongue normal; teeth and gums normal   Neck:   Supple, symmetrical, trachea midline, no adenopathy;        thyroid:  No enlargement/tenderness/nodules; no carotid    bruit or JVD   Back:     Symmetric, no curvature, ROM normal, no CVA tenderness   Lungs:     Clear to auscultation bilaterally, respirations unlabored   Chest wall:    No tenderness or deformity   Heart:    Regular rate and rhythm, S1 and S2 normal, no murmur,        rub or gallop   Abdomen:     Soft, non-tender, bowel sounds active all four quadrants,     no masses, no organomegaly   Extremities:   Right knee crepitus.  Anterior draw sinc   Pulses:   2+ and symmetric all extremities   Skin:   Skin color, texture, turgor normal, no rashes or lesions   Lymph nodes:   Cervical, supraclavicular, and axillary nodes normal   Neurologic:   CNII-XII intact. Normal strength, sensation and reflexes       throughout "      Results for orders placed or performed in visit on 01/22/20   Lipid Panel   Result Value Ref Range    Total Cholesterol 202 (H) 0 - 200 mg/dL    Triglycerides 369 (H) 0 - 150 mg/dL    HDL Cholesterol 38 (L) 40 - 60 mg/dL    VLDL Cholesterol 73.8 mg/dL    LDL Cholesterol  90 0 - 100 mg/dL   Vitamin B12   Result Value Ref Range    Vitamin B-12 410 211 - 946 pg/mL   Comprehensive Metabolic Panel   Result Value Ref Range    Glucose 126 (H) 65 - 99 mg/dL    BUN 13 6 - 20 mg/dL    Creatinine 0.79 0.76 - 1.27 mg/dL    eGFR Non African Am 102 >60 mL/min/1.73    eGFR African Am 124 >60 mL/min/1.73    BUN/Creatinine Ratio 16.5 7.0 - 25.0    Sodium 136 136 - 145 mmol/L    Potassium 4.7 3.5 - 5.2 mmol/L    Chloride 93 (L) 98 - 107 mmol/L    Total CO2 27.5 22.0 - 29.0 mmol/L    Calcium 9.9 8.6 - 10.5 mg/dL    Total Protein 7.6 6.0 - 8.5 g/dL    Albumin 5.20 3.50 - 5.20 g/dL    Globulin 2.4 gm/dL    A/G Ratio 2.2 g/dL    Total Bilirubin 0.4 0.2 - 1.2 mg/dL    Alkaline Phosphatase 87 39 - 117 U/L    AST (SGOT) 18 1 - 40 U/L    ALT (SGPT) 22 1 - 41 U/L   PSA Screen   Result Value Ref Range    PSA 1.220 0.000 - 4.000 ng/mL   TSH   Result Value Ref Range    TSH 1.280 0.270 - 4.200 uIU/mL   T4, Free   Result Value Ref Range    Free T4 1.38 0.93 - 1.70 ng/dL   Hemoglobin A1c   Result Value Ref Range    Hemoglobin A1C 6.70 (H) 4.80 - 5.60 %   MicroAlbumin, Urine, Random - Urine, Clean Catch   Result Value Ref Range    Microalbumin, Urine 27.6 Not Estab. ug/mL   Sedimentation Rate   Result Value Ref Range    Sed Rate 1 0 - 20 mm/hr   Rheumatoid Factor   Result Value Ref Range    RA Latex Turbid <10.0 0.0 - 13.9 IU/mL   Cyclic Citrul Peptide Antibody, IgG / IgA   Result Value Ref Range    CCP Antibodies IgG/IgA 7 0 - 19 units   Ehrlichia Antibody Panel   Result Value Ref Range    E. chaffeensis ([facility]ME) IgG Titer Negative Neg:<1:64    E. chaffeensis ([facility]) IgM Titer Negative Neg:<1:20    HGE IgG Titer Negative Neg:<1:64    HGE IgM Titer  Negative Neg:<1:20   Lyme Disease, PCR   Result Value Ref Range    Lyme Disease(B.burgdorferi)PCR Negative Negative   [facility] Spotted Fever, IgG   Result Value Ref Range    RMSF IgG Negative Negative   [facility] Spotted Fever, IgM   Result Value Ref Range    RMSF IgM 0.47 0.00 - 0.89 index   Uric Acid   Result Value Ref Range    Uric Acid 6.1 3.4 - 7.0 mg/dL   C-reactive Protein   Result Value Ref Range    C-Reactive Protein 0.26 0.00 - 0.50 mg/dL   Antistreptolysin O Titer   Result Value Ref Range    ASO <20.0 0.0 - 200.0 IU/mL   Protein Elec + Interp, Serum   Result Value Ref Range    Albumin 4.1 2.9 - 4.4 g/dL    Alpha-1-Globulin 0.2 0.0 - 0.4 g/dL    Alpha-2-Globulin 0.8 0.4 - 1.0 g/dL    Beta Globulin 1.6 (H) 0.7 - 1.3 g/dL    Gamma Globulin 0.9 0.4 - 1.8 g/dL    M-Frankie Not Observed Not Observed g/dL    Globulin 3.5 2.2 - 3.9 g/dL    A/G Ratio 1.2 0.7 - 1.7    Please note Comment     SPE Interpretation Comment    CBC & Differential   Result Value Ref Range    WBC 7.16 3.40 - 10.80 10*3/mm3    RBC 5.44 4.14 - 5.80 10*6/mm3    Hemoglobin 15.7 13.0 - 17.7 g/dL    Hematocrit 45.9 37.5 - 51.0 %    MCV 84.4 79.0 - 97.0 fL    MCH 28.9 26.6 - 33.0 pg    MCHC 34.2 31.5 - 35.7 g/dL    RDW 12.6 12.3 - 15.4 %    Platelets 315 140 - 450 10*3/mm3    Neutrophil Rel % 66.3 42.7 - 76.0 %    Lymphocyte Rel % 22.1 19.6 - 45.3 %    Monocyte Rel % 7.8 5.0 - 12.0 %    Eosinophil Rel % 2.1 0.3 - 6.2 %    Basophil Rel % 1.1 0.0 - 1.5 %    Neutrophils Absolute 4.75 1.70 - 7.00 10*3/mm3    Lymphocytes Absolute 1.58 0.70 - 3.10 10*3/mm3    Monocytes Absolute 0.56 0.10 - 0.90 10*3/mm3    Eosinophils Absolute 0.15 0.00 - 0.40 10*3/mm3    Basophils Absolute 0.08 0.00 - 0.20 10*3/mm3    Immature Granulocyte Rel % 0.6 (H) 0.0 - 0.5 %    Immature Grans Absolute 0.04 0.00 - 0.05 10*3/mm3    nRBC 0.0 0.0 - 0.2 /100 WBC         Assessment/Plan   Right knee pain. Start knee brace with metal support.         Vaughn was seen today for  diabetes.    Diagnoses and all orders for this visit:    Chronic pain of right knee  -     Protein Elec + Interp, Serum    Abnormal serum protein electrophoresis  -     Protein Elec + Interp, Serum      Return in about 3 months (around 10/7/2020).          There are no Patient Instructions on file for this visit.     Sen Nichole MD    Assessment/Plan

## 2020-07-15 ENCOUNTER — OFFICE VISIT (OUTPATIENT)
Dept: PULMONOLOGY | Facility: CLINIC | Age: 55
End: 2020-07-15

## 2020-07-15 VITALS
OXYGEN SATURATION: 97 % | BODY MASS INDEX: 26.9 KG/M2 | SYSTOLIC BLOOD PRESSURE: 120 MMHG | DIASTOLIC BLOOD PRESSURE: 70 MMHG | WEIGHT: 203 LBS | HEART RATE: 91 BPM | HEIGHT: 73 IN

## 2020-07-15 DIAGNOSIS — R06.83 SNORING: ICD-10-CM

## 2020-07-15 DIAGNOSIS — G25.81 RESTLESS LEG SYNDROME: Primary | ICD-10-CM

## 2020-07-15 PROCEDURE — 99213 OFFICE O/P EST LOW 20 MIN: CPT | Performed by: INTERNAL MEDICINE

## 2020-07-15 RX ORDER — ROPINIROLE 1 MG/1
1 TABLET, FILM COATED ORAL NIGHTLY
Qty: 30 TABLET | Refills: 5 | Status: SHIPPED | OUTPATIENT
Start: 2020-07-15 | End: 2021-01-11

## 2020-07-15 NOTE — PROGRESS NOTES
"Chief Complaint   Patient presents with   • Follow-up   • Sleep Apnea       Subjective   Vaughn Huynh is a 54 y.o. male.     History of Present Illness   Comes back today to follow up on possible sleep apnea.     Patient also complains of an urge to move his legs along with occasional tingling sensation. Patient also reports occasions when he gets \"cramps\" and has to rub them off and sometimes walk them off.    He denies any ongoing issues with daytime sleepiness or tiredness. His wife says that he snores but a \"lot less\".      The following portions of the patient's history were reviewed and updated as appropriate: allergies, current medications, past family history, past medical history, past social history and past surgical history.    Review of Systems   Psychiatric/Behavioral: Positive for sleep disturbance.   All other systems reviewed and are negative.      Objective   Visit Vitals  /70 (BP Location: Right arm, Patient Position: Sitting, Cuff Size: Large Adult)   Pulse 91   Ht 185.4 cm (73\")   Wt 92.1 kg (203 lb)   SpO2 97%   BMI 26.78 kg/m²       Physical Exam   Constitutional: He is oriented to person, place, and time. He appears well-developed and well-nourished.   HENT:   Head: Atraumatic.   Crowded oropharynx.    Musculoskeletal:   Gait was normal.   Neurological: He is alert and oriented to person, place, and time.   Psychiatric: He has a normal mood and affect.   Vitals reviewed.      Assessment/Plan   Vaughn was seen today for follow-up and sleep apnea.    Diagnoses and all orders for this visit:    Restless leg syndrome    Snoring    Other orders  -     rOPINIRole (Requip) 1 MG tablet; Take 1 tablet by mouth Every Night. Take 1 hour before bedtime.         Return in about 3 months (around 10/15/2020) for SleepONLY/Mary, ....Also 7-8 mths w/ Dr. Woodall.    DISCUSSION (if any):  Reviewed his last sleep study. Poor efficiency but no significant AHI even during REM.    Patient's last CBC " excludes significant iron deficiency causing anemia, as a potential contributor to symptoms of RLS.    We will start the patient on Requip and adjust the dose according to symptomatic relief.    he was informed about the side effects in great detail.    Since his symptoms are not consistent with SALVADOR, we will treat his RLS and see if that helps.    If Requip doesn't help, we will consider either Mirapex or Neupro patch or Gabapentin pills.    I spent a total of 16 minutes face to face with this patient. his pertinent current and previous data, as applicable, were reviewed. Patient's diagnostic studies were also reviewed.  More than 10 minutes of the time spent, was spent in counseling about his underlying disease process, reviewing any available sleep studies, need to use device (as applicable) on a regular basis and lifestyle modifications that may impact patient's health.     Dictated utilizing Dragon dictation.    This document was electronically signed by Michelet Woodall MD on 07/15/20 at 14:01

## 2020-08-21 LAB
ALBUMIN SERPL ELPH-MCNC: 4.1 G/DL (ref 2.9–4.4)
ALBUMIN SERPL-MCNC: 4.8 G/DL (ref 3.5–5.2)
ALBUMIN/GLOB SERPL: 1.4 {RATIO} (ref 0.7–1.7)
ALBUMIN/GLOB SERPL: 2.4 G/DL
ALP SERPL-CCNC: 82 U/L (ref 39–117)
ALPHA1 GLOB SERPL ELPH-MCNC: 0.2 G/DL (ref 0–0.4)
ALPHA2 GLOB SERPL ELPH-MCNC: 0.9 G/DL (ref 0.4–1)
ALT SERPL-CCNC: 28 U/L (ref 1–41)
AST SERPL-CCNC: 19 U/L (ref 1–40)
B-GLOBULIN SERPL ELPH-MCNC: 1.2 G/DL (ref 0.7–1.3)
BASOPHILS # BLD AUTO: 0.08 10*3/MM3 (ref 0–0.2)
BASOPHILS NFR BLD AUTO: 1.3 % (ref 0–1.5)
BILIRUB SERPL-MCNC: 0.4 MG/DL (ref 0–1.2)
BUN SERPL-MCNC: 12 MG/DL (ref 6–20)
BUN/CREAT SERPL: 15 (ref 7–25)
CALCIUM SERPL-MCNC: 9.5 MG/DL (ref 8.6–10.5)
CHLORIDE SERPL-SCNC: 95 MMOL/L (ref 98–107)
CHOLEST SERPL-MCNC: 183 MG/DL (ref 0–200)
CO2 SERPL-SCNC: 27.6 MMOL/L (ref 22–29)
CREAT SERPL-MCNC: 0.8 MG/DL (ref 0.76–1.27)
EOSINOPHIL # BLD AUTO: 0.16 10*3/MM3 (ref 0–0.4)
EOSINOPHIL NFR BLD AUTO: 2.6 % (ref 0.3–6.2)
ERYTHROCYTE [DISTWIDTH] IN BLOOD BY AUTOMATED COUNT: 13.1 % (ref 12.3–15.4)
GAMMA GLOB SERPL ELPH-MCNC: 0.8 G/DL (ref 0.4–1.8)
GLOBULIN SER CALC-MCNC: 2 GM/DL
GLOBULIN SER CALC-MCNC: 3 G/DL (ref 2.2–3.9)
GLUCOSE SERPL-MCNC: 134 MG/DL (ref 65–99)
HBA1C MFR BLD: 7.1 % (ref 4.8–5.6)
HCT VFR BLD AUTO: 46.7 % (ref 37.5–51)
HDLC SERPL-MCNC: 34 MG/DL (ref 40–60)
HGB BLD-MCNC: 15.6 G/DL (ref 13–17.7)
IMM GRANULOCYTES # BLD AUTO: 0.03 10*3/MM3 (ref 0–0.05)
IMM GRANULOCYTES NFR BLD AUTO: 0.5 % (ref 0–0.5)
LABORATORY COMMENT REPORT: NORMAL
LDLC SERPL CALC-MCNC: ABNORMAL MG/DL
LYMPHOCYTES # BLD AUTO: 1.41 10*3/MM3 (ref 0.7–3.1)
LYMPHOCYTES NFR BLD AUTO: 22.8 % (ref 19.6–45.3)
M PROTEIN SERPL ELPH-MCNC: NORMAL G/DL
MCH RBC QN AUTO: 28.1 PG (ref 26.6–33)
MCHC RBC AUTO-ENTMCNC: 33.4 G/DL (ref 31.5–35.7)
MCV RBC AUTO: 84 FL (ref 79–97)
MICROALBUMIN UR-MCNC: 66.7 UG/ML
MONOCYTES # BLD AUTO: 0.45 10*3/MM3 (ref 0.1–0.9)
MONOCYTES NFR BLD AUTO: 7.3 % (ref 5–12)
NEUTROPHILS # BLD AUTO: 4.06 10*3/MM3 (ref 1.7–7)
NEUTROPHILS NFR BLD AUTO: 65.5 % (ref 42.7–76)
NRBC BLD AUTO-RTO: 0 /100 WBC (ref 0–0.2)
PLATELET # BLD AUTO: 286 10*3/MM3 (ref 140–450)
POTASSIUM SERPL-SCNC: 4.4 MMOL/L (ref 3.5–5.2)
PROT PATTERN SERPL ELPH-IMP: NORMAL
PROT SERPL-MCNC: 6.8 G/DL (ref 6–8.5)
PROT SERPL-MCNC: 7.1 G/DL (ref 6–8.5)
PSA SERPL-MCNC: 1.3 NG/ML (ref 0–4)
RBC # BLD AUTO: 5.56 10*6/MM3 (ref 4.14–5.8)
SODIUM SERPL-SCNC: 135 MMOL/L (ref 136–145)
T4 FREE SERPL-MCNC: 1.22 NG/DL (ref 0.93–1.7)
TRIGL SERPL-MCNC: 502 MG/DL (ref 0–150)
TSH SERPL DL<=0.005 MIU/L-ACNC: 1.06 UIU/ML (ref 0.27–4.2)
VIT B12 SERPL-MCNC: 325 PG/ML (ref 211–946)
VLDLC SERPL CALC-MCNC: ABNORMAL MG/DL
WBC # BLD AUTO: 6.19 10*3/MM3 (ref 3.4–10.8)

## 2020-10-07 ENCOUNTER — OFFICE VISIT (OUTPATIENT)
Dept: INTERNAL MEDICINE | Facility: CLINIC | Age: 55
End: 2020-10-07

## 2020-10-07 VITALS
RESPIRATION RATE: 16 BRPM | OXYGEN SATURATION: 99 % | BODY MASS INDEX: 26.41 KG/M2 | SYSTOLIC BLOOD PRESSURE: 123 MMHG | HEIGHT: 74 IN | WEIGHT: 205.8 LBS | DIASTOLIC BLOOD PRESSURE: 83 MMHG | TEMPERATURE: 98.4 F | HEART RATE: 80 BPM

## 2020-10-07 DIAGNOSIS — G89.29 CHRONIC PAIN OF RIGHT KNEE: ICD-10-CM

## 2020-10-07 DIAGNOSIS — I10 ESSENTIAL HYPERTENSION: ICD-10-CM

## 2020-10-07 DIAGNOSIS — M25.561 CHRONIC PAIN OF RIGHT KNEE: ICD-10-CM

## 2020-10-07 DIAGNOSIS — E11.69 TYPE 2 DIABETES MELLITUS WITH OTHER SPECIFIED COMPLICATION, WITHOUT LONG-TERM CURRENT USE OF INSULIN (HCC): Primary | ICD-10-CM

## 2020-10-07 PROCEDURE — 90471 IMMUNIZATION ADMIN: CPT | Performed by: INTERNAL MEDICINE

## 2020-10-07 PROCEDURE — 99214 OFFICE O/P EST MOD 30 MIN: CPT | Performed by: INTERNAL MEDICINE

## 2020-10-07 PROCEDURE — 90686 IIV4 VACC NO PRSV 0.5 ML IM: CPT | Performed by: INTERNAL MEDICINE

## 2020-10-07 RX ORDER — ROSUVASTATIN CALCIUM 10 MG/1
10 TABLET, COATED ORAL NIGHTLY
Qty: 90 TABLET | Refills: 3 | Status: CANCELLED | OUTPATIENT
Start: 2020-10-07

## 2020-10-07 RX ORDER — ROSUVASTATIN CALCIUM 10 MG/1
10 TABLET, COATED ORAL NIGHTLY
Qty: 90 TABLET | Refills: 3 | Status: SHIPPED | OUTPATIENT
Start: 2020-10-07 | End: 2022-02-15

## 2020-10-07 NOTE — PROGRESS NOTES
Subjective     Patient ID: Vaughn Huynh is a 54 y.o. male. Patient is here for management of multiple medical problems.     Chief Complaint   Patient presents with   • Hyperlipidemia     follow-up   • Knee Pain     patient continuing to have right knee pain     History of Present Illness       Right knee pain. Seen ortho.  Had steroid inj. No sig improvement. Then had gel shots.    Hyperlip. Diet.  Not going well.          The following portions of the patient's history were reviewed and updated as appropriate: allergies, current medications, past family history, past medical history, past social history, past surgical history and problem list.    Review of Systems   Constitutional: Negative for diaphoresis and fatigue.   HENT: Negative for congestion and dental problem.    Musculoskeletal: Positive for arthralgias, gait problem and joint swelling. Negative for back pain.   Skin: Negative for pallor.   Neurological: Positive for weakness. Negative for dizziness, tremors, seizures, speech difficulty and headaches.   Psychiatric/Behavioral: Negative for confusion. The patient is not nervous/anxious.    All other systems reviewed and are negative.      Current Outpatient Medications:   •  apixaban (ELIQUIS) 5 MG tablet tablet, Take 1 tablet by mouth Every 12 (Twelve) Hours. Indications: Atrial Fibrillation, Disp: 180 tablet, Rfl: 3  •  aspirin 81 MG tablet, Take  by mouth daily., Disp: , Rfl:   •  esomeprazole (nexIUM) 40 MG capsule, Take 40 mg by mouth Every Morning Before Breakfast., Disp: , Rfl:   •  glucose blood test strip, E11.9 Use what is covered by insurance, Disp: 100 each, Rfl: 12  •  lisinopril-hydrochlorothiazide (PRINZIDE,ZESTORETIC) 20-12.5 MG per tablet, Take 1 tablet by mouth 2 (Two) Times a Day., Disp: 180 tablet, Rfl: 3  •  metFORMIN ER (GLUCOPHAGE-XR) 500 MG 24 hr tablet, Take 1 tablet by mouth 2 (Two) Times a Day With Meals., Disp: 180 tablet, Rfl: 3  •  ONE TOUCH LANCETS misc, E11.9 use what is  "covered by insurance., Disp: 100 each, Rfl: 12  •  rOPINIRole (Requip) 1 MG tablet, Take 1 tablet by mouth Every Night. Take 1 hour before bedtime., Disp: 30 tablet, Rfl: 5  •  rosuvastatin (CRESTOR) 10 MG tablet, Take 1 tablet by mouth Every Night., Disp: 90 tablet, Rfl: 3    Objective      Blood pressure 123/83, pulse 80, temperature 98.4 °F (36.9 °C), temperature source Temporal, resp. rate 16, height 188 cm (74\"), weight 93.4 kg (205 lb 12.8 oz), SpO2 99 %.    Physical Exam     General Appearance:    Alert, cooperative, no distress, appears stated age   Head:    Normocephalic, without obvious abnormality, atraumatic   Eyes:    PERRL, conjunctiva/corneas clear, EOM's intact   Ears:    Normal TM's and external ear canals, both ears   Nose:   Nares normal, septum midline, mucosa normal, no drainage   or sinus tenderness   Throat:   Lips, mucosa, and tongue normal; teeth and gums normal   Neck:   Supple, symmetrical, trachea midline, no adenopathy;        thyroid:  No enlargement/tenderness/nodules; no carotid    bruit or JVD   Back:     Symmetric, no curvature, ROM normal, no CVA tenderness   Lungs:     Clear to auscultation bilaterally, respirations unlabored   Chest wall:    No tenderness or deformity   Heart:    Regular rate and rhythm, S1 and S2 normal, no murmur,        rub or gallop   Abdomen:     Soft, non-tender, bowel sounds active all four quadrants,     no masses, no organomegaly   Extremities:   Extremities normal, atraumatic, no cyanosis or edema   Pulses:   2+ and symmetric all extremities   Skin:   Skin color, texture, turgor normal, no rashes or lesions   Lymph nodes:   Cervical, supraclavicular, and axillary nodes normal   Neurologic:   CNII-XII intact. Normal strength, sensation and reflexes       throughout      Results for orders placed or performed in visit on 07/07/20   Protein Elec + Interp, Serum    Specimen: Blood   Result Value Ref Range    Total Protein 7.1 6.0 - 8.5 g/dL    Albumin 4.1 " 2.9 - 4.4 g/dL    Alpha-1-Globulin 0.2 0.0 - 0.4 g/dL    Alpha-2-Globulin 0.9 0.4 - 1.0 g/dL    Beta Globulin 1.2 0.7 - 1.3 g/dL    Gamma Globulin 0.8 0.4 - 1.8 g/dL    M-Frankie Not Observed Not Observed g/dL    Globulin 3.0 2.2 - 3.9 g/dL    A/G Ratio 1.4 0.7 - 1.7    Please note Comment     SPE Interpretation Comment          Assessment/Plan     Pt will need to tighten up diet.   On vit b12 now/.    Take osteo bi flex, vit c,  Knee brace.      High BS in am, likely underlying antony.  Pt still undergoing eval.    Answers for HPI/ROS submitted by the patient on 10/5/2020   Diabetes problem  What is the primary reason for your visit?: Diabetes    Vaughn was seen today for hyperlipidemia and knee pain.    Diagnoses and all orders for this visit:    Type 2 diabetes mellitus with other specified complication, without long-term current use of insulin (CMS/[facility])    Chronic pain of right knee  -     Uric acid    Essential hypertension    Other orders  -     Fluarix Quad >6 Months ([id number])  -     rosuvastatin (CRESTOR) 10 MG tablet; Take 1 tablet by mouth Every Night.      Return in about 4 months (around 2/7/2021).          There are no Patient Instructions on file for this visit.     Sen Nichole MD    Assessment/Plan

## 2020-10-08 LAB — URATE SERPL-MCNC: 5.9 MG/DL (ref 3.4–7)

## 2020-10-08 RX ORDER — ALLOPURINOL 100 MG/1
100 TABLET ORAL DAILY
Qty: 90 TABLET | Refills: 3 | Status: SHIPPED | OUTPATIENT
Start: 2020-10-08 | End: 2022-02-15

## 2020-10-08 NOTE — PROGRESS NOTES
Please let them know the uric acid is down a bit but still high enough to cause problems. I sent in allopurinol. Will see if joint pain improves over the next few weeks.

## 2021-01-13 RX ORDER — ROPINIROLE 1 MG/1
TABLET, FILM COATED ORAL
Qty: 30 TABLET | Refills: 0 | Status: SHIPPED | OUTPATIENT
Start: 2021-01-13 | End: 2021-02-10

## 2021-02-10 ENCOUNTER — OFFICE VISIT (OUTPATIENT)
Dept: INTERNAL MEDICINE | Facility: CLINIC | Age: 56
End: 2021-02-10

## 2021-02-10 VITALS
OXYGEN SATURATION: 98 % | WEIGHT: 206 LBS | SYSTOLIC BLOOD PRESSURE: 122 MMHG | RESPIRATION RATE: 16 BRPM | HEIGHT: 74 IN | TEMPERATURE: 97.5 F | BODY MASS INDEX: 26.44 KG/M2 | HEART RATE: 90 BPM | DIASTOLIC BLOOD PRESSURE: 80 MMHG

## 2021-02-10 DIAGNOSIS — Z12.5 PROSTATE CANCER SCREENING: ICD-10-CM

## 2021-02-10 DIAGNOSIS — E11.65 TYPE 2 DIABETES MELLITUS WITH HYPERGLYCEMIA, WITHOUT LONG-TERM CURRENT USE OF INSULIN (HCC): Primary | ICD-10-CM

## 2021-02-10 DIAGNOSIS — E79.0 HYPERURICEMIA: ICD-10-CM

## 2021-02-10 LAB — URATE SERPL-MCNC: 5.6 MG/DL (ref 3.4–7)

## 2021-02-10 PROCEDURE — 99214 OFFICE O/P EST MOD 30 MIN: CPT | Performed by: INTERNAL MEDICINE

## 2021-02-10 RX ORDER — BETAMETHASONE DIPROPIONATE 0.5 MG/G
CREAM TOPICAL 2 TIMES DAILY
Qty: 15 G | Refills: 0 | Status: SHIPPED | OUTPATIENT
Start: 2021-02-10 | End: 2021-12-28

## 2021-02-10 RX ORDER — ORAL SEMAGLUTIDE 3 MG/1
3 TABLET ORAL DAILY
Qty: 30 TABLET | Refills: 11 | Status: SHIPPED | OUTPATIENT
Start: 2021-02-10 | End: 2021-05-13

## 2021-02-11 LAB
ALBUMIN SERPL-MCNC: 4.7 G/DL (ref 3.5–5.2)
ALBUMIN/GLOB SERPL: 2 G/DL
ALP SERPL-CCNC: 82 U/L (ref 39–117)
ALT SERPL-CCNC: 35 U/L (ref 1–41)
AST SERPL-CCNC: 26 U/L (ref 1–40)
BASOPHILS # BLD AUTO: 0.05 10*3/MM3 (ref 0–0.2)
BASOPHILS NFR BLD AUTO: 0.9 % (ref 0–1.5)
BILIRUB SERPL-MCNC: 0.5 MG/DL (ref 0–1.2)
BUN SERPL-MCNC: 14 MG/DL (ref 6–20)
BUN/CREAT SERPL: 17.5 (ref 7–25)
CALCIUM SERPL-MCNC: 9.6 MG/DL (ref 8.6–10.5)
CHLORIDE SERPL-SCNC: 99 MMOL/L (ref 98–107)
CHOLEST SERPL-MCNC: 197 MG/DL (ref 0–200)
CO2 SERPL-SCNC: 30.5 MMOL/L (ref 22–29)
CREAT SERPL-MCNC: 0.8 MG/DL (ref 0.76–1.27)
EOSINOPHIL # BLD AUTO: 0.18 10*3/MM3 (ref 0–0.4)
EOSINOPHIL NFR BLD AUTO: 3.1 % (ref 0.3–6.2)
ERYTHROCYTE [DISTWIDTH] IN BLOOD BY AUTOMATED COUNT: 12.9 % (ref 12.3–15.4)
GLOBULIN SER CALC-MCNC: 2.4 GM/DL
GLUCOSE SERPL-MCNC: 145 MG/DL (ref 65–99)
HBA1C MFR BLD: 7.8 % (ref 4.8–5.6)
HCT VFR BLD AUTO: 43.5 % (ref 37.5–51)
HDLC SERPL-MCNC: 34 MG/DL (ref 40–60)
HGB BLD-MCNC: 15 G/DL (ref 13–17.7)
IMM GRANULOCYTES # BLD AUTO: 0.04 10*3/MM3 (ref 0–0.05)
IMM GRANULOCYTES NFR BLD AUTO: 0.7 % (ref 0–0.5)
LDLC SERPL CALC-MCNC: 95 MG/DL (ref 0–100)
LYMPHOCYTES # BLD AUTO: 1.23 10*3/MM3 (ref 0.7–3.1)
LYMPHOCYTES NFR BLD AUTO: 21.4 % (ref 19.6–45.3)
MCH RBC QN AUTO: 29 PG (ref 26.6–33)
MCHC RBC AUTO-ENTMCNC: 34.5 G/DL (ref 31.5–35.7)
MCV RBC AUTO: 84 FL (ref 79–97)
MICROALBUMIN UR-MCNC: 37.4 UG/ML
MONOCYTES # BLD AUTO: 0.42 10*3/MM3 (ref 0.1–0.9)
MONOCYTES NFR BLD AUTO: 7.3 % (ref 5–12)
NEUTROPHILS # BLD AUTO: 3.83 10*3/MM3 (ref 1.7–7)
NEUTROPHILS NFR BLD AUTO: 66.6 % (ref 42.7–76)
NRBC BLD AUTO-RTO: 0 /100 WBC (ref 0–0.2)
PLATELET # BLD AUTO: 312 10*3/MM3 (ref 140–450)
POTASSIUM SERPL-SCNC: 4.7 MMOL/L (ref 3.5–5.2)
PROT SERPL-MCNC: 7.1 G/DL (ref 6–8.5)
PSA SERPL-MCNC: 0.92 NG/ML (ref 0–4)
RBC # BLD AUTO: 5.18 10*6/MM3 (ref 4.14–5.8)
SODIUM SERPL-SCNC: 137 MMOL/L (ref 136–145)
T4 FREE SERPL-MCNC: 1.24 NG/DL (ref 0.93–1.7)
TRIGL SERPL-MCNC: 409 MG/DL (ref 0–150)
TSH SERPL DL<=0.005 MIU/L-ACNC: 1.16 UIU/ML (ref 0.27–4.2)
VIT B12 SERPL-MCNC: 874 PG/ML (ref 211–946)
VLDLC SERPL CALC-MCNC: 68 MG/DL (ref 5–40)
WBC # BLD AUTO: 5.75 10*3/MM3 (ref 3.4–10.8)

## 2021-02-12 ENCOUNTER — TELEPHONE (OUTPATIENT)
Dept: INTERNAL MEDICINE | Facility: CLINIC | Age: 56
End: 2021-02-12

## 2021-02-12 DIAGNOSIS — E11.65 TYPE 2 DIABETES MELLITUS WITH HYPERGLYCEMIA, WITHOUT LONG-TERM CURRENT USE OF INSULIN (HCC): Primary | ICD-10-CM

## 2021-02-12 NOTE — TELEPHONE ENCOUNTER
----- Message from Sen Nichole MD sent at 2/11/2021  9:31 AM EST -----  Please let them know the labs look good

## 2021-02-12 NOTE — TELEPHONE ENCOUNTER
Contacted patient with lab results and he is requesting a referral to a nutritionist to help with diet and HbA1C level.     Referral pended for provider to review and sign if appropriate.

## 2021-04-23 DIAGNOSIS — I10 ESSENTIAL HYPERTENSION: ICD-10-CM

## 2021-04-23 RX ORDER — LISINOPRIL AND HYDROCHLOROTHIAZIDE 20; 12.5 MG/1; MG/1
TABLET ORAL
Qty: 180 TABLET | Refills: 3 | Status: SHIPPED | OUTPATIENT
Start: 2021-04-23 | End: 2022-06-21

## 2021-05-13 ENCOUNTER — OFFICE VISIT (OUTPATIENT)
Dept: INTERNAL MEDICINE | Facility: CLINIC | Age: 56
End: 2021-05-13

## 2021-05-13 VITALS
SYSTOLIC BLOOD PRESSURE: 128 MMHG | BODY MASS INDEX: 26.56 KG/M2 | DIASTOLIC BLOOD PRESSURE: 80 MMHG | TEMPERATURE: 96 F | HEART RATE: 101 BPM | WEIGHT: 207 LBS | HEIGHT: 74 IN | OXYGEN SATURATION: 98 %

## 2021-05-13 DIAGNOSIS — E11.49 TYPE 2 DIABETES MELLITUS WITH OTHER NEUROLOGIC COMPLICATION, WITHOUT LONG-TERM CURRENT USE OF INSULIN (HCC): Primary | ICD-10-CM

## 2021-05-13 DIAGNOSIS — E79.0 HYPERURICEMIA: ICD-10-CM

## 2021-05-13 PROCEDURE — 99214 OFFICE O/P EST MOD 30 MIN: CPT | Performed by: INTERNAL MEDICINE

## 2021-05-13 RX ORDER — ORAL SEMAGLUTIDE 7 MG/1
7 TABLET ORAL DAILY
Qty: 30 TABLET | Refills: 3 | Status: SHIPPED | OUTPATIENT
Start: 2021-05-13 | End: 2021-10-07

## 2021-05-13 NOTE — PROGRESS NOTES
Subjective     Patient ID: Vaughn Huynh is a 55 y.o. male. Patient is here for management of multiple medical problems.     Chief Complaint   Patient presents with   • Follow-up   • Diabetes     History of Present Illness       Diabetes   Not exercises.       Arthritis. Worse and stopped allopurinol  Hurts and aching.   May be worse since stopped.     The following portions of the patient's history were reviewed and updated as appropriate: allergies, current medications, past family history, past medical history, past social history, past surgical history and problem list.    Review of Systems   Constitutional: Positive for fatigue.   Psychiatric/Behavioral: Negative for self-injury and sleep disturbance. The patient is not nervous/anxious.    All other systems reviewed and are negative.      Current Outpatient Medications:   •  apixaban (ELIQUIS) 5 MG tablet tablet, Take 1 tablet by mouth Every 12 (Twelve) Hours. Indications: Atrial Fibrillation, Disp: 180 tablet, Rfl: 3  •  aspirin 81 MG tablet, Take  by mouth daily., Disp: , Rfl:   •  betamethasone dipropionate 0.05 % cream, Apply  topically to the appropriate area as directed 2 (Two) Times a Day., Disp: 15 g, Rfl: 0  •  esomeprazole (nexIUM) 40 MG capsule, Take 40 mg by mouth Every Morning Before Breakfast., Disp: , Rfl:   •  glucose blood test strip, E11.9 Use what is covered by insurance, Disp: 100 each, Rfl: 12  •  lisinopril-hydrochlorothiazide (PRINZIDE,ZESTORETIC) 20-12.5 MG per tablet, TAKE 1 TABLET BY MOUTH TWICE DAILY, Disp: 180 tablet, Rfl: 3  •  metFORMIN (Glucophage) 500 MG tablet, Take 1 tablet by mouth 2 (Two) Times a Day With Meals., Disp: 180 tablet, Rfl: 3  •  ONE TOUCH LANCETS misc, E11.9 use what is covered by insurance., Disp: 100 each, Rfl: 12  •  rosuvastatin (CRESTOR) 10 MG tablet, Take 1 tablet by mouth Every Night., Disp: 90 tablet, Rfl: 3  •  allopurinol (Zyloprim) 100 MG tablet, Take 1 tablet by mouth Daily., Disp: 90 tablet, Rfl:  "3  •  Semaglutide (Rybelsus) 7 MG tablet, Take 7 mg by mouth Daily., Disp: 30 tablet, Rfl: 3    Objective      Blood pressure 128/80, pulse 101, temperature 96 °F (35.6 °C), height 188 cm (74\"), weight 93.9 kg (207 lb), SpO2 98 %.    Physical Exam     General Appearance:    Alert, cooperative, no distress, appears stated age   Head:    Normocephalic, without obvious abnormality, atraumatic   Eyes:    PERRL, conjunctiva/corneas clear, EOM's intact   Ears:    Normal TM's and external ear canals, both ears   Nose:   Nares normal, septum midline, mucosa normal, no drainage   or sinus tenderness   Throat:   Lips, mucosa, and tongue normal; teeth and gums normal   Neck:   Supple, symmetrical, trachea midline, no adenopathy;        thyroid:  No enlargement/tenderness/nodules; no carotid    bruit or JVD   Back:     Symmetric, no curvature, ROM normal, no CVA tenderness   Lungs:     Clear to auscultation bilaterally, respirations unlabored   Chest wall:    No tenderness or deformity   Heart:    Regular rate and rhythm, S1 and S2 normal, no murmur,        rub or gallop   Abdomen:     Soft, non-tender, bowel sounds active all four quadrants,     no masses, no organomegaly   Extremities:   Extremities normal, atraumatic, no cyanosis or edema   Pulses:   2+ and symmetric all extremities   Skin:   Skin color, texture, turgor normal, no rashes or lesions   Lymph nodes:   Cervical, supraclavicular, and axillary nodes normal   Neurologic:   CNII-XII intact. Normal strength, sensation and reflexes       throughout      Results for orders placed or performed in visit on 02/10/21   Vitamin B12    Specimen: Blood   Result Value Ref Range    Vitamin B-12 874 211 - 946 pg/mL   Lipid Panel    Specimen: Blood   Result Value Ref Range    Total Cholesterol 197 0 - 200 mg/dL    Triglycerides 409 (H) 0 - 150 mg/dL    HDL Cholesterol 34 (L) 40 - 60 mg/dL    VLDL Cholesterol Palmer 68 (H) 5 - 40 mg/dL    LDL Chol Calc (NIH) 95 0 - 100 mg/dL "   Comprehensive Metabolic Panel    Specimen: Blood   Result Value Ref Range    Glucose 145 (H) 65 - 99 mg/dL    BUN 14 6 - 20 mg/dL    Creatinine 0.80 0.76 - 1.27 mg/dL    eGFR Non African Am 100 >60 mL/min/1.73    eGFR African Am 122 >60 mL/min/1.73    BUN/Creatinine Ratio 17.5 7.0 - 25.0    Sodium 137 136 - 145 mmol/L    Potassium 4.7 3.5 - 5.2 mmol/L    Chloride 99 98 - 107 mmol/L    Total CO2 30.5 (H) 22.0 - 29.0 mmol/L    Calcium 9.6 8.6 - 10.5 mg/dL    Total Protein 7.1 6.0 - 8.5 g/dL    Albumin 4.70 3.50 - 5.20 g/dL    Globulin 2.4 gm/dL    A/G Ratio 2.0 g/dL    Total Bilirubin 0.5 0.0 - 1.2 mg/dL    Alkaline Phosphatase 82 39 - 117 U/L    AST (SGOT) 26 1 - 40 U/L    ALT (SGPT) 35 1 - 41 U/L   TSH    Specimen: Blood   Result Value Ref Range    TSH 1.160 0.270 - 4.200 uIU/mL   Hemoglobin A1c    Specimen: Blood   Result Value Ref Range    Hemoglobin A1C 7.80 (H) 4.80 - 5.60 %   T4, Free    Specimen: Blood   Result Value Ref Range    Free T4 1.24 0.93 - 1.70 ng/dL   MicroAlbumin, Urine, Random - Urine, Clean Catch    Specimen: Urine, Clean Catch   Result Value Ref Range    Microalbumin, Urine 37.4 Not Estab. ug/mL   PSA Screen    Specimen: Blood   Result Value Ref Range    PSA 0.916 0.000 - 4.000 ng/mL   Uric Acid    Specimen: Blood   Result Value Ref Range    Uric Acid 5.6 3.4 - 7.0 mg/dL   CBC & Differential    Specimen: Blood   Result Value Ref Range    WBC 5.75 3.40 - 10.80 10*3/mm3    RBC 5.18 4.14 - 5.80 10*6/mm3    Hemoglobin 15.0 13.0 - 17.7 g/dL    Hematocrit 43.5 37.5 - 51.0 %    MCV 84.0 79.0 - 97.0 fL    MCH 29.0 26.6 - 33.0 pg    MCHC 34.5 31.5 - 35.7 g/dL    RDW 12.9 12.3 - 15.4 %    Platelets 312 140 - 450 10*3/mm3    Neutrophil Rel % 66.6 42.7 - 76.0 %    Lymphocyte Rel % 21.4 19.6 - 45.3 %    Monocyte Rel % 7.3 5.0 - 12.0 %    Eosinophil Rel % 3.1 0.3 - 6.2 %    Basophil Rel % 0.9 0.0 - 1.5 %    Neutrophils Absolute 3.83 1.70 - 7.00 10*3/mm3    Lymphocytes Absolute 1.23 0.70 - 3.10 10*3/mm3     Monocytes Absolute 0.42 0.10 - 0.90 10*3/mm3    Eosinophils Absolute 0.18 0.00 - 0.40 10*3/mm3    Basophils Absolute 0.05 0.00 - 0.20 10*3/mm3    Immature Granulocyte Rel % 0.7 (H) 0.0 - 0.5 %    Immature Grans Absolute 0.04 0.00 - 0.05 10*3/mm3    nRBC 0.0 0.0 - 0.2 /100 WBC         Assessment/Plan       Diagnoses and all orders for this visit:    1. Type 2 diabetes mellitus with other neurologic complication, without long-term current use of insulin (CMS/[facility]) (Primary)  -     TSH  -     T4, Free  -     Comprehensive Metabolic Panel  -     CBC & Differential  -     Vitamin B12  -     Lipid Panel  -     Hemoglobin A1c  -     Uric acid    2. Hyperuricemia  -     TSH  -     T4, Free  -     Comprehensive Metabolic Panel  -     CBC & Differential  -     Vitamin B12  -     Lipid Panel  -     Hemoglobin A1c  -     Uric acid    Other orders  -     Semaglutide (Rybelsus) 7 MG tablet; Take 7 mg by mouth Daily.  Dispense: 30 tablet; Refill: 3      Return in about 3 months (around 8/13/2021).          There are no Patient Instructions on file for this visit.     Sen Nichole MD    Assessment/Plan

## 2021-09-15 RX ORDER — ROSUVASTATIN CALCIUM 10 MG/1
10 TABLET, COATED ORAL NIGHTLY
Qty: 90 TABLET | Refills: 3 | OUTPATIENT
Start: 2021-09-15

## 2021-10-07 ENCOUNTER — OFFICE VISIT (OUTPATIENT)
Dept: INTERNAL MEDICINE | Facility: CLINIC | Age: 56
End: 2021-10-07

## 2021-10-07 VITALS
SYSTOLIC BLOOD PRESSURE: 130 MMHG | WEIGHT: 203 LBS | HEIGHT: 74 IN | DIASTOLIC BLOOD PRESSURE: 80 MMHG | HEART RATE: 83 BPM | BODY MASS INDEX: 26.05 KG/M2 | RESPIRATION RATE: 16 BRPM | OXYGEN SATURATION: 98 % | TEMPERATURE: 97.5 F

## 2021-10-07 DIAGNOSIS — E11.9 TYPE 2 DIABETES MELLITUS WITHOUT COMPLICATION, WITHOUT LONG-TERM CURRENT USE OF INSULIN (HCC): ICD-10-CM

## 2021-10-07 DIAGNOSIS — G89.29 CHRONIC PAIN OF BOTH KNEES: Primary | ICD-10-CM

## 2021-10-07 DIAGNOSIS — E78.1 HYPERTRIGLYCERIDEMIA: ICD-10-CM

## 2021-10-07 DIAGNOSIS — M25.562 CHRONIC PAIN OF BOTH KNEES: Primary | ICD-10-CM

## 2021-10-07 DIAGNOSIS — M25.561 CHRONIC PAIN OF BOTH KNEES: Primary | ICD-10-CM

## 2021-10-07 LAB
ALBUMIN SERPL-MCNC: 4.5 G/DL (ref 3.5–5.2)
ALBUMIN/GLOB SERPL: 2.3 G/DL
ALP SERPL-CCNC: 89 U/L (ref 39–117)
ALT SERPL-CCNC: 37 U/L (ref 1–41)
AST SERPL-CCNC: 24 U/L (ref 1–40)
BASOPHILS # BLD AUTO: 0.05 10*3/MM3 (ref 0–0.2)
BASOPHILS NFR BLD AUTO: 0.7 % (ref 0–1.5)
BILIRUB SERPL-MCNC: 0.3 MG/DL (ref 0–1.2)
BUN SERPL-MCNC: 11 MG/DL (ref 6–20)
BUN/CREAT SERPL: 15.5 (ref 7–25)
CALCIUM SERPL-MCNC: 9.3 MG/DL (ref 8.6–10.5)
CHLORIDE SERPL-SCNC: 101 MMOL/L (ref 98–107)
CHOLEST SERPL-MCNC: 192 MG/DL (ref 0–200)
CO2 SERPL-SCNC: 26.5 MMOL/L (ref 22–29)
CREAT SERPL-MCNC: 0.71 MG/DL (ref 0.76–1.27)
EOSINOPHIL # BLD AUTO: 0.23 10*3/MM3 (ref 0–0.4)
EOSINOPHIL NFR BLD AUTO: 3.1 % (ref 0.3–6.2)
ERYTHROCYTE [DISTWIDTH] IN BLOOD BY AUTOMATED COUNT: 13.4 % (ref 12.3–15.4)
GLOBULIN SER CALC-MCNC: 2 GM/DL
GLUCOSE SERPL-MCNC: 138 MG/DL (ref 65–99)
HBA1C MFR BLD: 7.2 % (ref 4.8–5.6)
HCT VFR BLD AUTO: 43.8 % (ref 37.5–51)
HDLC SERPL-MCNC: 28 MG/DL (ref 40–60)
HGB BLD-MCNC: 14.6 G/DL (ref 13–17.7)
IMM GRANULOCYTES # BLD AUTO: 0.04 10*3/MM3 (ref 0–0.05)
IMM GRANULOCYTES NFR BLD AUTO: 0.5 % (ref 0–0.5)
LDLC SERPL CALC-MCNC: 65 MG/DL (ref 0–100)
LYMPHOCYTES # BLD AUTO: 0.99 10*3/MM3 (ref 0.7–3.1)
LYMPHOCYTES NFR BLD AUTO: 13.4 % (ref 19.6–45.3)
MCH RBC QN AUTO: 28.5 PG (ref 26.6–33)
MCHC RBC AUTO-ENTMCNC: 33.3 G/DL (ref 31.5–35.7)
MCV RBC AUTO: 85.4 FL (ref 79–97)
MONOCYTES # BLD AUTO: 0.38 10*3/MM3 (ref 0.1–0.9)
MONOCYTES NFR BLD AUTO: 5.1 % (ref 5–12)
NEUTROPHILS # BLD AUTO: 5.7 10*3/MM3 (ref 1.7–7)
NEUTROPHILS NFR BLD AUTO: 77.2 % (ref 42.7–76)
NRBC BLD AUTO-RTO: 0 /100 WBC (ref 0–0.2)
PLATELET # BLD AUTO: 282 10*3/MM3 (ref 140–450)
POTASSIUM SERPL-SCNC: 4.4 MMOL/L (ref 3.5–5.2)
PROT SERPL-MCNC: 6.5 G/DL (ref 6–8.5)
RBC # BLD AUTO: 5.13 10*6/MM3 (ref 4.14–5.8)
SODIUM SERPL-SCNC: 139 MMOL/L (ref 136–145)
T4 FREE SERPL-MCNC: 1.08 NG/DL (ref 0.93–1.7)
TRIGL SERPL-MCNC: 645 MG/DL (ref 0–150)
TSH SERPL DL<=0.005 MIU/L-ACNC: 1.5 UIU/ML (ref 0.27–4.2)
URATE SERPL-MCNC: 4.9 MG/DL (ref 3.4–7)
VIT B12 SERPL-MCNC: 450 PG/ML (ref 211–946)
VLDLC SERPL CALC-MCNC: 99 MG/DL (ref 5–40)
WBC # BLD AUTO: 7.39 10*3/MM3 (ref 3.4–10.8)

## 2021-10-07 PROCEDURE — 99214 OFFICE O/P EST MOD 30 MIN: CPT | Performed by: INTERNAL MEDICINE

## 2021-10-07 RX ORDER — ORAL SEMAGLUTIDE 14 MG/1
1 TABLET ORAL DAILY
Qty: 30 TABLET | Refills: 11 | Status: SHIPPED | OUTPATIENT
Start: 2021-10-07 | End: 2022-10-21

## 2021-10-07 NOTE — PROGRESS NOTES
Subjective     Patient ID: Vaughn Huynh is a 55 y.o. male. Patient is here for management of multiple medical problems.     Chief Complaint   Patient presents with   • Diabetes     History of Present Illness     Knees hurt. Had inj.   Seen anil knee and joint.  No significant improvement.  Pain in muscle.        The following portions of the patient's history were reviewed and updated as appropriate: allergies, current medications, past family history, past medical history, past social history, past surgical history and problem list.    Review of Systems   Constitutional: Negative for fatigue.   Psychiatric/Behavioral: Negative for self-injury and sleep disturbance. The patient is not nervous/anxious and is not hyperactive.    All other systems reviewed and are negative.      Current Outpatient Medications:   •  allopurinol (Zyloprim) 100 MG tablet, Take 1 tablet by mouth Daily., Disp: 90 tablet, Rfl: 3  •  apixaban (ELIQUIS) 5 MG tablet tablet, Take 1 tablet by mouth Every 12 (Twelve) Hours. Indications: Atrial Fibrillation, Disp: 180 tablet, Rfl: 3  •  aspirin 81 MG tablet, Take  by mouth daily., Disp: , Rfl:   •  betamethasone dipropionate 0.05 % cream, Apply  topically to the appropriate area as directed 2 (Two) Times a Day., Disp: 15 g, Rfl: 0  •  esomeprazole (nexIUM) 40 MG capsule, Take 40 mg by mouth Every Morning Before Breakfast., Disp: , Rfl:   •  glucose blood test strip, E11.9 Use what is covered by insurance, Disp: 100 each, Rfl: 12  •  lisinopril-hydrochlorothiazide (PRINZIDE,ZESTORETIC) 20-12.5 MG per tablet, TAKE 1 TABLET BY MOUTH TWICE DAILY, Disp: 180 tablet, Rfl: 3  •  metFORMIN (Glucophage) 500 MG tablet, Take 1 tablet by mouth 2 (Two) Times a Day With Meals., Disp: 180 tablet, Rfl: 3  •  ONE TOUCH LANCETS misc, E11.9 use what is covered by insurance., Disp: 100 each, Rfl: 12  •  rosuvastatin (CRESTOR) 10 MG tablet, Take 1 tablet by mouth Every Night., Disp: 90 tablet, Rfl: 3  •  Semaglutide  "(Rybelsus) 14 MG tablet, Take 1 tablet by mouth Daily., Disp: 30 tablet, Rfl: 11    Objective      Blood pressure 130/80, pulse 83, temperature 97.5 °F (36.4 °C), resp. rate 16, height 188 cm (74\"), weight 92.1 kg (203 lb), SpO2 98 %.    Physical Exam     General Appearance:    Alert, cooperative, no distress, appears stated age   Head:    Normocephalic, without obvious abnormality, atraumatic   Eyes:    PERRL, conjunctiva/corneas clear, EOM's intact   Ears:    Normal TM's and external ear canals, both ears   Nose:   Nares normal, septum midline, mucosa normal, no drainage   or sinus tenderness   Throat:   Lips, mucosa, and tongue normal; teeth and gums normal   Neck:   Supple, symmetrical, trachea midline, no adenopathy;        thyroid:  No enlargement/tenderness/nodules; no carotid    bruit or JVD   Back:     Symmetric, no curvature, ROM normal, no CVA tenderness   Lungs:     Clear to auscultation bilaterally, respirations unlabored   Chest wall:    No tenderness or deformity   Heart:    Regular rate and rhythm, S1 and S2 normal, no murmur,        rub or gallop   Abdomen:     Soft, non-tender, bowel sounds active all four quadrants,     no masses, no organomegaly   Extremities:   Extremities normal, atraumatic, no cyanosis or edema   Pulses:   2+ and symmetric all extremities   Skin:   Skin color, texture, turgor normal, no rashes or lesions   Lymph nodes:   Cervical, supraclavicular, and axillary nodes normal   Neurologic:   CNII-XII intact. Normal strength, sensation and reflexes       throughout      Results for orders placed or performed in visit on 05/13/21   TSH    Specimen: Blood   Result Value Ref Range    TSH 1.500 0.270 - 4.200 uIU/mL   T4, Free    Specimen: Blood   Result Value Ref Range    Free T4 1.08 0.93 - 1.70 ng/dL   Comprehensive Metabolic Panel    Specimen: Blood   Result Value Ref Range    Glucose 138 (H) 65 - 99 mg/dL    BUN 11 6 - 20 mg/dL    Creatinine 0.71 (L) 0.76 - 1.27 mg/dL    eGFR Non "  Am 115 >60 mL/min/1.73    eGFR African Am 140 >60 mL/min/1.73    BUN/Creatinine Ratio 15.5 7.0 - 25.0    Sodium 139 136 - 145 mmol/L    Potassium 4.4 3.5 - 5.2 mmol/L    Chloride 101 98 - 107 mmol/L    Total CO2 26.5 22.0 - 29.0 mmol/L    Calcium 9.3 8.6 - 10.5 mg/dL    Total Protein 6.5 6.0 - 8.5 g/dL    Albumin 4.50 3.50 - 5.20 g/dL    Globulin 2.0 gm/dL    A/G Ratio 2.3 g/dL    Total Bilirubin 0.3 0.0 - 1.2 mg/dL    Alkaline Phosphatase 89 39 - 117 U/L    AST (SGOT) 24 1 - 40 U/L    ALT (SGPT) 37 1 - 41 U/L   Vitamin B12    Specimen: Blood   Result Value Ref Range    Vitamin B-12 450 211 - 946 pg/mL   Lipid Panel    Specimen: Blood   Result Value Ref Range    Total Cholesterol 192 0 - 200 mg/dL    Triglycerides 645 (H) 0 - 150 mg/dL    HDL Cholesterol 28 (L) 40 - 60 mg/dL    VLDL Cholesterol Palmer 99 (H) 5 - 40 mg/dL    LDL Chol Calc (NIH) 65 0 - 100 mg/dL   Hemoglobin A1c    Specimen: Blood   Result Value Ref Range    Hemoglobin A1C 7.20 (H) 4.80 - 5.60 %   Uric acid    Specimen: Blood   Result Value Ref Range    Uric Acid 4.9 3.4 - 7.0 mg/dL   CBC & Differential    Specimen: Blood   Result Value Ref Range    WBC 7.39 3.40 - 10.80 10*3/mm3    RBC 5.13 4.14 - 5.80 10*6/mm3    Hemoglobin 14.6 13.0 - 17.7 g/dL    Hematocrit 43.8 37.5 - 51.0 %    MCV 85.4 79.0 - 97.0 fL    MCH 28.5 26.6 - 33.0 pg    MCHC 33.3 31.5 - 35.7 g/dL    RDW 13.4 12.3 - 15.4 %    Platelets 282 140 - 450 10*3/mm3    Neutrophil Rel % 77.2 (H) 42.7 - 76.0 %    Lymphocyte Rel % 13.4 (L) 19.6 - 45.3 %    Monocyte Rel % 5.1 5.0 - 12.0 %    Eosinophil Rel % 3.1 0.3 - 6.2 %    Basophil Rel % 0.7 0.0 - 1.5 %    Neutrophils Absolute 5.70 1.70 - 7.00 10*3/mm3    Lymphocytes Absolute 0.99 0.70 - 3.10 10*3/mm3    Monocytes Absolute 0.38 0.10 - 0.90 10*3/mm3    Eosinophils Absolute 0.23 0.00 - 0.40 10*3/mm3    Basophils Absolute 0.05 0.00 - 0.20 10*3/mm3    Immature Granulocyte Rel % 0.5 0.0 - 0.5 %    Immature Grans Absolute 0.04 0.00 - 0.05  10*3/mm3    nRBC 0.0 0.0 - 0.2 /100 WBC         Assessment/Plan       Diagnoses and all orders for this visit:    1. Chronic pain of both knees (Primary)  -     Ambulatory Referral to Physical Therapy    2. Type 2 diabetes mellitus without complication, without long-term current use of insulin (HCC)  -     Semaglutide (Rybelsus) 14 MG tablet; Take 1 tablet by mouth Daily.  Dispense: 30 tablet; Refill: 11  -     Comprehensive Metabolic Panel  -     TSH  -     T4, Free  -     Vitamin B12  -     CBC & Differential  -     Lipid Panel  -     Hemoglobin A1c  -     MicroAlbumin, Urine, Random - Urine, Clean Catch    3. Hypertriglyceridemia  -     Comprehensive Metabolic Panel  -     TSH  -     T4, Free  -     Vitamin B12  -     CBC & Differential  -     Lipid Panel  -     Hemoglobin A1c  -     MicroAlbumin, Urine, Random - Urine, Clean Catch      Return in about 6 months (around 4/7/2022).          There are no Patient Instructions on file for this visit.     Sen Nichole MD    Assessment/Plan

## 2021-11-15 ENCOUNTER — TREATMENT (OUTPATIENT)
Dept: PHYSICAL THERAPY | Facility: CLINIC | Age: 56
End: 2021-11-15

## 2021-11-15 DIAGNOSIS — M25.561 CHRONIC PAIN OF BOTH KNEES: ICD-10-CM

## 2021-11-15 DIAGNOSIS — G89.29 CHRONIC PAIN OF BOTH KNEES: ICD-10-CM

## 2021-11-15 DIAGNOSIS — M25.562 CHRONIC PAIN OF BOTH KNEES: ICD-10-CM

## 2021-11-15 PROCEDURE — 97161 PT EVAL LOW COMPLEX 20 MIN: CPT | Performed by: PHYSICAL THERAPIST

## 2021-11-15 NOTE — PROGRESS NOTES
"  Physical Therapy Initial Evaluation and Plan of Care    Patient: Vaughn Huynh   : 1965  Diagnosis/ICD-10 Code:  No primary diagnosis found.  Referring practitioner: Sen Nichole MD    Subjective Evaluation    History of Present Illness  Date of onset: 11/15/2016  Mechanism of injury: Pt states that his R knee is \"bone on bone.\"  Pt states that he has muscle weakness that is more prominent in his R knee. Pt states that he has diabetes. Pt stated that walking and traversing steps is hard with walking down stairs being the worst.  Pt states that his knee pain has been going on for 5 years and the muscle weakness has been going on for about 3-5 years. Pt stated that this was when he first started taking cholesterol medicaton. Pt stated that his symptoms have worsened in the last year.  Pt states that sometimes when he is standing up it can take several seconds for him to be able to stand, due to pain. Pt states he has previous had injections in his knee and that have helped, but only briefly. Pt states that he would like to be able to run and bending over.        Patient Occupation: self employed  Pain  Current pain ratin  At best pain ratin  At worst pain ratin  Location: Superior to the patella in the R knee and R calf  Quality: dull ache and sharp  Relieving factors: heat, ice, rest and change in position  Aggravating factors: stairs, squatting and ambulation  Progression: worsening    Social Support  Lives in: multiple-level home  Lives with: spouse    Hand dominance: left    Diagnostic Tests  X-ray: abnormal    Treatments  Previous treatment: injection treatment and physical therapy  Patient Goals  Patient goals for therapy: increased strength, independence with ADLs/IADLs, return to sport/leisure activities, increased motion and decreased pain             Objective          Neurological Testing     Reflexes   Left   Patellar (L4): trace (1+)  Achilles (S1): trace (1+)    Right "   Patellar (L4): trace (1+)  Achilles (S1): trace (1+)    Active Range of Motion   Left Knee   Flexion: 132 degrees     Right Knee   Flexion: 114 degrees     Strength/Myotome Testing     Left Hip   Planes of Motion   Abduction: 4    Right Hip   Planes of Motion   Abduction: 4    Left Knee   Flexion: 5  Extension: 5  Quadriceps contraction: good    Right Knee   Flexion: 5  Extension: 5  Quadriceps contraction: poor          Assessment & Plan     Assessment  Impairments: abnormal or restricted ROM, activity intolerance, impaired physical strength, lacks appropriate home exercise program and pain with function  Assessment details: Pt is a 56 year old male presenting chronic bilateral knee pain. Pt has more difficulties with his R knee than his left. Pt has an overall feeling of weakness that is worse in his R knee. PT has decrease ROM of the R knee when compared bilaterally. Pt has decrease hip strength bilaterally. Pt has difficulties with walking, climbing stairs and squating. Pt presents with signs and symptoms consistent with knee osteoarthritis   Functional Limitations: lifting, walking and stooping  Goals  Plan Goals: Short term (2 weeks):  1. Pt will be independent with a HEP.    2. Pt will have be able to reach at least 5 degrees from full extension to improve gait and quadriceps function.    3. Pt will be able to climb 7 stairs with pain no more than 2/10.    Long term (6 weeks.)  1. Pt will be able to squat down to  an object from the floor with no pain.   2. Pt will be able to walk for 10 minutes with no pain.  3. Pt will be able to climb 14 stairs with no pain.    Plan  Therapy options: will be seen for skilled physical therapy services  Planned modality interventions: cryotherapy and thermotherapy (hydrocollator packs)  Planned therapy interventions: manual therapy, neuromuscular re-education, soft tissue mobilization, strengthening, stretching, therapeutic activities, joint mobilization, home  exercise program, functional ROM exercises and flexibility  Frequency: 2x week  Treatment plan discussed with: patient        Manual Therapy:         mins  41660;  Therapeutic Exercise:         mins  26195;     Neuromuscular Nancy:        mins  36486;    Therapeutic Activity:          mins  22964;     Gait Training:           mins  55010;     Ultrasound:          mins  40562;    Electrical Stimulation:         mins  27827 ( );  Dry Needling          mins self-pay    Timed Treatment:      mins   Total Treatment:    35    mins    PT SIGNATURE: Matias Schuster, PT   DATE TREATMENT INITIATED: 11/15/2021    Initial Certification  Certification Period: 2/13/2022  I certify that the therapy services are furnished while this patient is under my care.  The services outlined above are required by this patient, and will be reviewed every 90 days.     PHYSICIAN: Sen Nichole MD      DATE:     Please sign and return via fax to [phone].. Thank you, [facility].

## 2021-11-23 ENCOUNTER — TREATMENT (OUTPATIENT)
Dept: PHYSICAL THERAPY | Facility: CLINIC | Age: 56
End: 2021-11-23

## 2021-11-23 DIAGNOSIS — M25.561 CHRONIC PAIN OF BOTH KNEES: Primary | ICD-10-CM

## 2021-11-23 DIAGNOSIS — M25.562 CHRONIC PAIN OF BOTH KNEES: Primary | ICD-10-CM

## 2021-11-23 DIAGNOSIS — G89.29 CHRONIC PAIN OF BOTH KNEES: Primary | ICD-10-CM

## 2021-11-23 PROCEDURE — 97110 THERAPEUTIC EXERCISES: CPT | Performed by: PHYSICAL THERAPIST

## 2021-11-23 PROCEDURE — 97112 NEUROMUSCULAR REEDUCATION: CPT | Performed by: PHYSICAL THERAPIST

## 2021-11-23 PROCEDURE — 97140 MANUAL THERAPY 1/> REGIONS: CPT | Performed by: PHYSICAL THERAPIST

## 2021-11-23 NOTE — PROGRESS NOTES
Physical Therapy Daily Progress Note      Visit #: 2     Vaughn Huynh reports 0/10 pain today at rest.  Pt reports  that his knees have been feeling pretty good so far. Pt states that he has been doing well with his HEP. Pt reports that his pain has been more on the anterior aspect of his shin as of late.     Objective Pt present to PT today with no distress at rest.      Pt tolerated all new exercises well having no increase in pain.    Pt demonstrated good strength of the knees and hips bilaterally with exercises.     See Exercise, Manual, and Modality Logs for complete treatment.     Assessment/Plan  Pt is having decreased knee pain with activities. PT to progress based on pt's reaction to current treatment. Pt to continue PT to increase bilateral knee and hip strength and pain free function.       Progress per Plan of Care      Visit Diagnosis:    ICD-10-CM ICD-9-CM   1. Chronic pain of both knees  M25.561 719.46    M25.562 338.29    G89.29              Manual Therapy:    10     mins  76248;  Therapeutic Exercise:    15     mins  41765;     Neuromuscular Nancy:    14    mins  89998;    Therapeutic Activity:          mins  78981;     Gait Training:           mins  84627;     Ultrasound:          mins  71365;    Electrical Stimulation:         mins  55519 ( );  Dry Needling          mins self-pay  Iontophoresis          mins 99797      Timed Treatment:   39   mins   Total Treatment:     40   mins    Matias Schuster, PT  Physical Therapist

## 2021-11-29 ENCOUNTER — TREATMENT (OUTPATIENT)
Dept: PHYSICAL THERAPY | Facility: CLINIC | Age: 56
End: 2021-11-29

## 2021-11-29 DIAGNOSIS — M25.562 CHRONIC PAIN OF BOTH KNEES: Primary | ICD-10-CM

## 2021-11-29 DIAGNOSIS — M25.561 CHRONIC PAIN OF BOTH KNEES: Primary | ICD-10-CM

## 2021-11-29 DIAGNOSIS — G89.29 CHRONIC PAIN OF BOTH KNEES: Primary | ICD-10-CM

## 2021-11-29 PROCEDURE — 97140 MANUAL THERAPY 1/> REGIONS: CPT | Performed by: PHYSICAL THERAPIST

## 2021-11-29 PROCEDURE — 97110 THERAPEUTIC EXERCISES: CPT | Performed by: PHYSICAL THERAPIST

## 2021-11-29 NOTE — PROGRESS NOTES
"Physical Therapy Daily Progress Note      Visit #: 3    Vaughn Huynh reports 0/10 pain today at rest.  Pt states that his calf has been where most of his pain has been lately. Pt states that he did a lot of riding in the car this weekend and his knees felt a little stiff following being in the car. Pt stated that when he woke up this morning his pain in his knee was initially a 1/10.     Objective Pt present to PT today with no distress at rest.     Pt did well with all exercises having no increase in pain with any exercises.     Pt had pain previously with lateral walking with the band around his ankles. PT moved the band to around his knees which alleviated the pain while remaining challenging.     Pt demonstrated increased strength with exercises and pt stated that he \"can tell he is getting stronger.\"     See Exercise, Manual, and Modality Logs for complete treatment.      Assessment/Plan  Pt is continuing to progress with increasing hip and knee strength.Next session will include stair climbing. Pt to continue PT to increase hip and knee strength.      Progress per Plan of Care      Visit Diagnosis:    ICD-10-CM ICD-9-CM   1. Chronic pain of both knees  M25.561 719.46    M25.562 338.29    G89.29             Manual Therapy:    8     mins  21302;  Therapeutic Exercise:    32     mins  27027;     Neuromuscular Nancy:        mins  02522;    Therapeutic Activity:          mins  65419;     Gait Training:           mins  57943;     Ultrasound:          mins  52971;    Electrical Stimulation:         mins  18375 ( );  Dry Needling          mins self-pay  Iontophoresis          mins 64383      Timed Treatment:   40   mins   Total Treatment:     42   mins    Matias Schuster, PT  Physical Therapist        "

## 2021-12-03 ENCOUNTER — TREATMENT (OUTPATIENT)
Dept: PHYSICAL THERAPY | Facility: CLINIC | Age: 56
End: 2021-12-03

## 2021-12-03 DIAGNOSIS — M25.561 CHRONIC PAIN OF BOTH KNEES: Primary | ICD-10-CM

## 2021-12-03 DIAGNOSIS — G89.29 CHRONIC PAIN OF BOTH KNEES: Primary | ICD-10-CM

## 2021-12-03 DIAGNOSIS — M25.562 CHRONIC PAIN OF BOTH KNEES: Primary | ICD-10-CM

## 2021-12-03 PROCEDURE — 97110 THERAPEUTIC EXERCISES: CPT | Performed by: PHYSICAL THERAPIST

## 2021-12-03 PROCEDURE — 97140 MANUAL THERAPY 1/> REGIONS: CPT | Performed by: PHYSICAL THERAPIST

## 2021-12-03 PROCEDURE — 97112 NEUROMUSCULAR REEDUCATION: CPT | Performed by: PHYSICAL THERAPIST

## 2021-12-03 NOTE — PROGRESS NOTES
Physical Therapy Daily Progress Note      Visit #: 4    Vaughn Huynh reports 0/10 pain today at rest.  Pt reports that he has not been having a lot of knee pain and most of his pain is located on the anterior aspect of his shin and calf. Pt reports that he can tell that his knee is getting better.       Objective Pt present to PT today with no distress at rest.    Pt was able to complete stairs with no pain in the knee.     Pt demonstrated good strength of the hips and knees with all exercises and had no pain throughout.    See Exercise, Manual, and Modality Logs for complete treatment.     Assessment/Plan  Pt is continuing to show improvement with his knee and hip strength and pain free function. Pt will follow up with Pt next week to determine how patient responds to new exercises and PT will progressed as necessary.      Progress per Plan of Care      Visit Diagnosis:    ICD-10-CM ICD-9-CM   1. Chronic pain of both knees  M25.561 719.46    M25.562 338.29    G89.29             Manual Therapy:    11     mins  61711;  Therapeutic Exercise:    17     mins  54335;     Neuromuscular Nancy:   15    mins  81183;    Therapeutic Activity:          mins  49220;     Gait Training:           mins  04787;     Ultrasound:          mins  03684;    Electrical Stimulation:         mins  16726 ( );  Dry Needling          mins self-pay  Iontophoresis          mins 94178      Timed Treatment:   43   mins   Total Treatment:     45   mins    Matias Schuster, PT  Physical Therapist

## 2021-12-10 ENCOUNTER — TREATMENT (OUTPATIENT)
Dept: PHYSICAL THERAPY | Facility: CLINIC | Age: 56
End: 2021-12-10

## 2021-12-10 DIAGNOSIS — G89.29 CHRONIC PAIN OF BOTH KNEES: Primary | ICD-10-CM

## 2021-12-10 DIAGNOSIS — M25.562 CHRONIC PAIN OF BOTH KNEES: Primary | ICD-10-CM

## 2021-12-10 DIAGNOSIS — M25.561 CHRONIC PAIN OF BOTH KNEES: Primary | ICD-10-CM

## 2021-12-10 PROCEDURE — 97530 THERAPEUTIC ACTIVITIES: CPT | Performed by: PHYSICAL THERAPIST

## 2021-12-10 PROCEDURE — 97140 MANUAL THERAPY 1/> REGIONS: CPT | Performed by: PHYSICAL THERAPIST

## 2021-12-10 PROCEDURE — 97110 THERAPEUTIC EXERCISES: CPT | Performed by: PHYSICAL THERAPIST

## 2021-12-10 NOTE — PROGRESS NOTES
Physical Therapy Daily Progress Note      Visit #: 5     Vaughn Huynh reports 0/10 pain today at rest.  Pt reports that his knee has not been bothering him as much lately. Pt reports that his knee usually feels good in the morning and begins to hurt towards the end of the day. Pt reports that when he goes down stairs he has been compensating with his L leg to keep the weight off his R leg. Pt reports that his L ankle has felt like its not as strong and he cannot hold much weight on it alone. Pt reports that he did well following his last PT session and had no increased soreness.     Objective Pt present to PT today with no distress at rest.     Pt demonstrated good strength with all new exercises and had no increase in pain.     Pt was educated on exercises that he can incorporate at home.    See Exercise, Manual, and Modality Logs for complete treatment.     Assessment/Plan  Pt is continuing to show improved strength and function of both knees. Pt is having decreased pain in his knees bilaterally. Pt to follow up in a few weeks to see how he responds to exercises at home. Pt will likely be ready for discharge following next visit.       Progress per Plan of Care      Visit Diagnosis:    ICD-10-CM ICD-9-CM   1. Chronic pain of both knees  M25.561 719.46    M25.562 338.29    G89.29             Manual Therapy:    15     mins  84178;  Therapeutic Exercise:    26     mins  14220;     Neuromuscular Nancy:        mins  01156;    Therapeutic Activity:     14     mins  87829;     Gait Training:           mins  36357;     Ultrasound:          mins  82632;    Electrical Stimulation:         mins  82386 ( );  Dry Needling          mins self-pay  Iontophoresis          mins 24134      Timed Treatment:   55   mins   Total Treatment:     57   mins    Matias Schuster, PT  Physical Therapist

## 2021-12-28 RX ORDER — BETAMETHASONE DIPROPIONATE 0.5 MG/G
CREAM TOPICAL
Qty: 15 G | Refills: 0 | Status: SHIPPED | OUTPATIENT
Start: 2021-12-28 | End: 2022-10-21

## 2022-01-17 NOTE — TELEPHONE ENCOUNTER
Caller: Vaughn Huynh    Relationship: Self    Best call back number: [phone]     Requested Prescriptions:   Requested Prescriptions     Pending Prescriptions Disp Refills   • glucose blood test strip 100 each 12     Sig: E11.9 Use what is covered by insurance        Pharmacy where request should be sent: [id number] [address] [place] [address] [employer] [place] [facility] [place] [employer] [place] & - [phone] PH - [phone] FX     Additional details provided by patient:PATIENT IS OUT OF TEST STRIPS    Does the patient have less than a 3 day supply:  [x] Yes  [] No    Abby Uriarte Rep   01/17/22 12:00 EST

## 2022-02-15 ENCOUNTER — OFFICE VISIT (OUTPATIENT)
Dept: INTERNAL MEDICINE | Facility: CLINIC | Age: 57
End: 2022-02-15

## 2022-02-15 VITALS
RESPIRATION RATE: 16 BRPM | OXYGEN SATURATION: 96 % | TEMPERATURE: 98.4 F | DIASTOLIC BLOOD PRESSURE: 82 MMHG | HEIGHT: 74 IN | WEIGHT: 202 LBS | SYSTOLIC BLOOD PRESSURE: 114 MMHG | HEART RATE: 101 BPM | BODY MASS INDEX: 25.93 KG/M2

## 2022-02-15 DIAGNOSIS — M67.912 ROTATOR CUFF DISORDER, LEFT: Primary | ICD-10-CM

## 2022-02-15 DIAGNOSIS — E11.9 TYPE 2 DIABETES MELLITUS WITHOUT COMPLICATION, WITHOUT LONG-TERM CURRENT USE OF INSULIN: ICD-10-CM

## 2022-02-15 PROCEDURE — 99213 OFFICE O/P EST LOW 20 MIN: CPT | Performed by: INTERNAL MEDICINE

## 2022-02-15 RX ORDER — BLOOD-GLUCOSE METER
1 KIT MISCELLANEOUS AS NEEDED
Qty: 1 EACH | Refills: 1 | Status: SHIPPED | OUTPATIENT
Start: 2022-02-15

## 2022-02-15 NOTE — PROGRESS NOTES
Subjective     Patient ID: Vaughn Huynh is a 56 y.o. male. Patient is here for management of multiple medical problems.     Chief Complaint   Patient presents with   • Shoulder Injury     left shoulder, patient fell on the ice about a week ago     History of Present Illness     Feel on ice and hit on left elbow and jamed left shoulder up. Shoulder still with pain.           The following portions of the patient's history were reviewed and updated as appropriate: allergies, current medications, past family history, past medical history, past social history, past surgical history and problem list.    Review of Systems    Current Outpatient Medications:   •  apixaban (ELIQUIS) 5 MG tablet tablet, Take 1 tablet by mouth Every 12 (Twelve) Hours. Indications: Atrial Fibrillation, Disp: 180 tablet, Rfl: 3  •  aspirin 81 MG tablet, Take  by mouth daily., Disp: , Rfl:   •  betamethasone dipropionate 0.05 % cream, APPLY TO AFFECTED AREA TWICE A DAY, Disp: 15 g, Rfl: 0  •  esomeprazole (nexIUM) 40 MG capsule, Take 40 mg by mouth Every Morning Before Breakfast., Disp: , Rfl:   •  glucose blood test strip, 1 each by Other route Daily. E11.9 Use what is covered by insurance, Disp: 100 each, Rfl: 3  •  lisinopril-hydrochlorothiazide (PRINZIDE,ZESTORETIC) 20-12.5 MG per tablet, TAKE 1 TABLET BY MOUTH TWICE DAILY, Disp: 180 tablet, Rfl: 3  •  metFORMIN (Glucophage) 500 MG tablet, Take 1 tablet by mouth 2 (Two) Times a Day With Meals., Disp: 180 tablet, Rfl: 3  •  ONE TOUCH LANCETS misc, E11.9 use what is covered by insurance., Disp: 100 each, Rfl: 12  •  Semaglutide (Rybelsus) 14 MG tablet, Take 1 tablet by mouth Daily., Disp: 30 tablet, Rfl: 11  •  Diclofenac Sodium (VOLTAREN) 1 % gel gel, Apply 4 g topically to the appropriate area as directed 4 (Four) Times a Day As Needed (pain)., Disp: 100 g, Rfl: 1  •  glucose monitor monitoring kit, 1 each As Needed (bs)., Disp: 1 each, Rfl: 1    Objective      Blood pressure 114/82, pulse  "101, temperature 98.4 °F (36.9 °C), resp. rate 16, height 188 cm (74\"), weight 91.6 kg (202 lb), SpO2 96 %.    Physical Exam     General Appearance:    Alert, cooperative, no distress, appears stated age   Head:    Normocephalic, without obvious abnormality, atraumatic   Eyes:    PERRL, conjunctiva/corneas clear, EOM's intact   Ears:    Normal TM's and external ear canals, both ears   Nose:   Nares normal, septum midline, mucosa normal, no drainage   or sinus tenderness   Throat:   Lips, mucosa, and tongue normal; teeth and gums normal   Neck:   Supple, symmetrical, trachea midline, no adenopathy;        thyroid:  No enlargement/tenderness/nodules; no carotid    bruit or JVD   Back:     Symmetric, no curvature, ROM normal, no CVA tenderness   Lungs:     Clear to auscultation bilaterally, respirations unlabored   Chest wall:    No tenderness or deformity   Heart:    Regular rate and rhythm, S1 and S2 normal, no murmur,        rub or gallop   Abdomen:     Soft, non-tender, bowel sounds active all four quadrants,     no masses, no organomegaly   Extremities:   Extremities normal, atraumatic, no cyanosis or edema   Pulses:   2+ and symmetric all extremities   Skin:   Skin color, texture, turgor normal, no rashes or lesions   Lymph nodes:   Cervical, supraclavicular, and axillary nodes normal   Neurologic:   CNII-XII intact. Normal strength, sensation and reflexes       throughout      Results for orders placed or performed in visit on 05/13/21   TSH    Specimen: Blood   Result Value Ref Range    TSH 1.500 0.270 - 4.200 uIU/mL   T4, Free    Specimen: Blood   Result Value Ref Range    Free T4 1.08 0.93 - 1.70 ng/dL   Comprehensive Metabolic Panel    Specimen: Blood   Result Value Ref Range    Glucose 138 (H) 65 - 99 mg/dL    BUN 11 6 - 20 mg/dL    Creatinine 0.71 (L) 0.76 - 1.27 mg/dL    eGFR Non African Am 115 >60 mL/min/1.73    eGFR African Am 140 >60 mL/min/1.73    BUN/Creatinine Ratio 15.5 7.0 - 25.0    Sodium 139 136 " - 145 mmol/L    Potassium 4.4 3.5 - 5.2 mmol/L    Chloride 101 98 - 107 mmol/L    Total CO2 26.5 22.0 - 29.0 mmol/L    Calcium 9.3 8.6 - 10.5 mg/dL    Total Protein 6.5 6.0 - 8.5 g/dL    Albumin 4.50 3.50 - 5.20 g/dL    Globulin 2.0 gm/dL    A/G Ratio 2.3 g/dL    Total Bilirubin 0.3 0.0 - 1.2 mg/dL    Alkaline Phosphatase 89 39 - 117 U/L    AST (SGOT) 24 1 - 40 U/L    ALT (SGPT) 37 1 - 41 U/L   Vitamin B12    Specimen: Blood   Result Value Ref Range    Vitamin B-12 450 211 - 946 pg/mL   Lipid Panel    Specimen: Blood   Result Value Ref Range    Total Cholesterol 192 0 - 200 mg/dL    Triglycerides 645 (H) 0 - 150 mg/dL    HDL Cholesterol 28 (L) 40 - 60 mg/dL    VLDL Cholesterol Palmer 99 (H) 5 - 40 mg/dL    LDL Chol Calc (NIH) 65 0 - 100 mg/dL   Hemoglobin A1c    Specimen: Blood   Result Value Ref Range    Hemoglobin A1C 7.20 (H) 4.80 - 5.60 %   Uric acid    Specimen: Blood   Result Value Ref Range    Uric Acid 4.9 3.4 - 7.0 mg/dL   CBC & Differential    Specimen: Blood   Result Value Ref Range    WBC 7.39 3.40 - 10.80 10*3/mm3    RBC 5.13 4.14 - 5.80 10*6/mm3    Hemoglobin 14.6 13.0 - 17.7 g/dL    Hematocrit 43.8 37.5 - 51.0 %    MCV 85.4 79.0 - 97.0 fL    MCH 28.5 26.6 - 33.0 pg    MCHC 33.3 31.5 - 35.7 g/dL    RDW 13.4 12.3 - 15.4 %    Platelets 282 140 - 450 10*3/mm3    Neutrophil Rel % 77.2 (H) 42.7 - 76.0 %    Lymphocyte Rel % 13.4 (L) 19.6 - 45.3 %    Monocyte Rel % 5.1 5.0 - 12.0 %    Eosinophil Rel % 3.1 0.3 - 6.2 %    Basophil Rel % 0.7 0.0 - 1.5 %    Neutrophils Absolute 5.70 1.70 - 7.00 10*3/mm3    Lymphocytes Absolute 0.99 0.70 - 3.10 10*3/mm3    Monocytes Absolute 0.38 0.10 - 0.90 10*3/mm3    Eosinophils Absolute 0.23 0.00 - 0.40 10*3/mm3    Basophils Absolute 0.05 0.00 - 0.20 10*3/mm3    Immature Granulocyte Rel % 0.5 0.0 - 0.5 %    Immature Grans Absolute 0.04 0.00 - 0.05 10*3/mm3    nRBC 0.0 0.0 - 0.2 /100 WBC         Assessment/Plan   Diclofenac gel for pain.          Diagnoses and all orders for this  visit:    1. Rotator cuff disorder, left (Primary)  -     Diclofenac Sodium (VOLTAREN) 1 % gel gel; Apply 4 g topically to the appropriate area as directed 4 (Four) Times a Day As Needed (pain).  Dispense: 100 g; Refill: 1    2. Type 2 diabetes mellitus without complication, without long-term current use of insulin (HCC)  -     glucose monitor monitoring kit; 1 each As Needed (bs).  Dispense: 1 each; Refill: 1      No follow-ups on file.          There are no Patient Instructions on file for this visit.     Sen Nichole MD    Assessment/Plan

## 2022-03-04 DIAGNOSIS — E11.9 TYPE 2 DIABETES MELLITUS WITHOUT COMPLICATION, WITHOUT LONG-TERM CURRENT USE OF INSULIN: Primary | ICD-10-CM

## 2022-03-04 NOTE — TELEPHONE ENCOUNTER
Rx Refill Note  Requested Prescriptions     Pending Prescriptions Disp Refills   • metFORMIN (GLUCOPHAGE) 500 MG tablet [Pharmacy Med Name: METFORMIN 500MG TABLETS] 180 tablet 3     Sig: TAKE 1 TABLET BY MOUTH TWICE DAILY WITH MEALS      Last office visit with prescribing clinician: 2/15/2022      Next office visit with prescribing clinician: 4/8/2022            MISHA KING MA  03/04/22, 16:34 EST

## 2022-06-21 DIAGNOSIS — I10 ESSENTIAL HYPERTENSION: ICD-10-CM

## 2022-06-21 RX ORDER — LISINOPRIL AND HYDROCHLOROTHIAZIDE 20; 12.5 MG/1; MG/1
TABLET ORAL
Qty: 180 TABLET | Refills: 3 | Status: SHIPPED | OUTPATIENT
Start: 2022-06-21

## 2022-07-12 LAB
ALBUMIN SERPL-MCNC: 4.2 G/DL (ref 3.5–5.2)
ALBUMIN/GLOB SERPL: 1.4 G/DL
ALP SERPL-CCNC: 89 U/L (ref 39–117)
ALT SERPL-CCNC: 41 U/L (ref 1–41)
AST SERPL-CCNC: 23 U/L (ref 1–40)
BASOPHILS # BLD AUTO: 0.05 10*3/MM3 (ref 0–0.2)
BASOPHILS NFR BLD AUTO: 0.9 % (ref 0–1.5)
BILIRUB SERPL-MCNC: 0.5 MG/DL (ref 0–1.2)
BUN SERPL-MCNC: 16 MG/DL (ref 6–20)
BUN/CREAT SERPL: 21.3 (ref 7–25)
CALCIUM SERPL-MCNC: 9.7 MG/DL (ref 8.6–10.5)
CHLORIDE SERPL-SCNC: 96 MMOL/L (ref 98–107)
CHOLEST SERPL-MCNC: 303 MG/DL (ref 0–200)
CO2 SERPL-SCNC: 25.4 MMOL/L (ref 22–29)
CREAT SERPL-MCNC: 0.75 MG/DL (ref 0.76–1.27)
EGFRCR SERPLBLD CKD-EPI 2021: 105.9 ML/MIN/1.73
EOSINOPHIL # BLD AUTO: 0.18 10*3/MM3 (ref 0–0.4)
EOSINOPHIL NFR BLD AUTO: 3.1 % (ref 0.3–6.2)
ERYTHROCYTE [DISTWIDTH] IN BLOOD BY AUTOMATED COUNT: 13.6 % (ref 12.3–15.4)
GLOBULIN SER CALC-MCNC: 3 GM/DL
GLUCOSE SERPL-MCNC: 200 MG/DL (ref 65–99)
HBA1C MFR BLD: 8.3 % (ref 4.8–5.6)
HCT VFR BLD AUTO: 43.2 % (ref 37.5–51)
HDLC SERPL-MCNC: 30 MG/DL (ref 40–60)
HGB BLD-MCNC: 15.5 G/DL (ref 13–17.7)
IMM GRANULOCYTES # BLD AUTO: 0.04 10*3/MM3 (ref 0–0.05)
IMM GRANULOCYTES NFR BLD AUTO: 0.7 % (ref 0–0.5)
LDLC SERPL CALC-MCNC: ABNORMAL MG/DL
LYMPHOCYTES # BLD AUTO: 1.48 10*3/MM3 (ref 0.7–3.1)
LYMPHOCYTES NFR BLD AUTO: 25.3 % (ref 19.6–45.3)
MCH RBC QN AUTO: 29.8 PG (ref 26.6–33)
MCHC RBC AUTO-ENTMCNC: 35.9 G/DL (ref 31.5–35.7)
MCV RBC AUTO: 82.9 FL (ref 79–97)
MONOCYTES # BLD AUTO: 0.47 10*3/MM3 (ref 0.1–0.9)
MONOCYTES NFR BLD AUTO: 8 % (ref 5–12)
NEUTROPHILS # BLD AUTO: 3.64 10*3/MM3 (ref 1.7–7)
NEUTROPHILS NFR BLD AUTO: 62 % (ref 42.7–76)
NRBC BLD AUTO-RTO: 0 /100 WBC (ref 0–0.2)
PLATELET # BLD AUTO: 268 10*3/MM3 (ref 140–450)
POTASSIUM SERPL-SCNC: 4.8 MMOL/L (ref 3.5–5.2)
PROT SERPL-MCNC: 7.2 G/DL (ref 6–8.5)
RBC # BLD AUTO: 5.21 10*6/MM3 (ref 4.14–5.8)
SODIUM SERPL-SCNC: 134 MMOL/L (ref 136–145)
T4 FREE SERPL-MCNC: 1.06 NG/DL (ref 0.93–1.7)
TRIGL SERPL-MCNC: 1040 MG/DL (ref 0–150)
TSH SERPL DL<=0.005 MIU/L-ACNC: 1.53 UIU/ML (ref 0.27–4.2)
UNABLE TO VOID: NORMAL
VIT B12 SERPL-MCNC: 343 PG/ML (ref 211–946)
VLDLC SERPL CALC-MCNC: ABNORMAL MG/DL
WBC # BLD AUTO: 5.86 10*3/MM3 (ref 3.4–10.8)

## 2022-07-13 LAB — MICROALBUMIN UR-MCNC: 281.9 UG/ML

## 2022-07-14 ENCOUNTER — OFFICE VISIT (OUTPATIENT)
Dept: INTERNAL MEDICINE | Facility: CLINIC | Age: 57
End: 2022-07-14

## 2022-07-14 VITALS
HEIGHT: 74 IN | DIASTOLIC BLOOD PRESSURE: 78 MMHG | WEIGHT: 200 LBS | RESPIRATION RATE: 16 BRPM | HEART RATE: 98 BPM | TEMPERATURE: 97.7 F | SYSTOLIC BLOOD PRESSURE: 100 MMHG | BODY MASS INDEX: 25.67 KG/M2 | OXYGEN SATURATION: 99 %

## 2022-07-14 DIAGNOSIS — E78.00 PURE HYPERCHOLESTEROLEMIA: ICD-10-CM

## 2022-07-14 DIAGNOSIS — R00.0 TACHYCARDIA: ICD-10-CM

## 2022-07-14 DIAGNOSIS — E11.49 TYPE 2 DIABETES MELLITUS WITH OTHER NEUROLOGIC COMPLICATION, WITHOUT LONG-TERM CURRENT USE OF INSULIN: Primary | ICD-10-CM

## 2022-07-14 PROCEDURE — 99214 OFFICE O/P EST MOD 30 MIN: CPT | Performed by: INTERNAL MEDICINE

## 2022-07-14 NOTE — PROGRESS NOTES
"Subjective     Patient ID: Vaughn Huynh is a 56 y.o. male. Patient is here for management of multiple medical problems.     Chief Complaint   Patient presents with   • Diabetes     History of Present Illness     DM                The following portions of the patient's history were reviewed and updated as appropriate: allergies, current medications, past family history, past medical history, past social history, past surgical history and problem list.    Review of Systems    Current Outpatient Medications:   •  apixaban (ELIQUIS) 5 MG tablet tablet, Take 1 tablet by mouth Every 12 (Twelve) Hours. Indications: Atrial Fibrillation, Disp: 180 tablet, Rfl: 3  •  aspirin 81 MG tablet, Take  by mouth daily., Disp: , Rfl:   •  betamethasone dipropionate 0.05 % cream, APPLY TO AFFECTED AREA TWICE A DAY, Disp: 15 g, Rfl: 0  •  Diclofenac Sodium (VOLTAREN) 1 % gel gel, Apply 4 g topically to the appropriate area as directed 4 (Four) Times a Day As Needed (pain)., Disp: 100 g, Rfl: 1  •  glucose blood test strip, 1 each by Other route Daily. E11.9 Use what is covered by insurance, Disp: 100 each, Rfl: 3  •  glucose monitor monitoring kit, 1 each As Needed (bs)., Disp: 1 each, Rfl: 1  •  lisinopril-hydrochlorothiazide (PRINZIDE,ZESTORETIC) 20-12.5 MG per tablet, TAKE 1 TABLET BY MOUTH TWICE DAILY, Disp: 180 tablet, Rfl: 3  •  metFORMIN (GLUCOPHAGE) 500 MG tablet, TAKE 1 TABLET BY MOUTH TWICE DAILY WITH MEALS, Disp: 180 tablet, Rfl: 3  •  ONE TOUCH LANCETS misc, E11.9 use what is covered by insurance., Disp: 100 each, Rfl: 12  •  Semaglutide (Rybelsus) 14 MG tablet, Take 1 tablet by mouth Daily., Disp: 30 tablet, Rfl: 11    Objective      Blood pressure 100/78, pulse 98, temperature 97.7 °F (36.5 °C), resp. rate 16, height 188 cm (74\"), weight 90.7 kg (200 lb), SpO2 99 %.    Physical Exam     General Appearance:    Alert, cooperative, no distress, appears stated age   Head:    Normocephalic, without obvious abnormality, " atraumatic   Eyes:    PERRL, conjunctiva/corneas clear, EOM's intact   Ears:    Normal TM's and external ear canals, both ears   Nose:   Nares normal, septum midline, mucosa normal, no drainage   or sinus tenderness   Throat:   Lips, mucosa, and tongue normal; teeth and gums normal   Neck:   Supple, symmetrical, trachea midline, no adenopathy;        thyroid:  No enlargement/tenderness/nodules; no carotid    bruit or JVD   Back:     Symmetric, no curvature, ROM normal, no CVA tenderness   Lungs:     Clear to auscultation bilaterally, respirations unlabored   Chest wall:    No tenderness or deformity   Heart:    Regular rate and rhythm, S1 and S2 normal, no murmur,        rub or gallop   Abdomen:     Soft, non-tender, bowel sounds active all four quadrants,     no masses, no organomegaly   Extremities:   Extremities normal, atraumatic, no cyanosis or edema   Pulses:   2+ and symmetric all extremities   Skin:   Skin color, texture, turgor normal, no rashes or lesions   Lymph nodes:   Cervical, supraclavicular, and axillary nodes normal   Neurologic:   CNII-XII intact. Normal strength, sensation and reflexes       throughout      Results for orders placed or performed in visit on 07/12/22   MicroAlbumin, Urine, Random -   Result Value Ref Range    Microalbumin, Urine 281.9 Not Estab. ug/mL         Assessment & Plan       Diagnoses and all orders for this visit:    1. Type 2 diabetes mellitus with other neurologic complication, without long-term current use of insulin (HCC) (Primary)    2. Pure hypercholesterolemia    3. Tachycardia  -     Ambulatory Referral to Cardiology      No follow-ups on file.          There are no Patient Instructions on file for this visit.     Sen Nichole MD    Assessment & Plan       Answers for HPI/ROS submitted by the patient on 7/13/2022  What is the primary reason for your visit?: Diabetes

## 2022-10-21 DIAGNOSIS — E11.9 TYPE 2 DIABETES MELLITUS WITHOUT COMPLICATION, WITHOUT LONG-TERM CURRENT USE OF INSULIN: ICD-10-CM

## 2022-10-21 RX ORDER — ORAL SEMAGLUTIDE 14 MG/1
1 TABLET ORAL DAILY
Qty: 30 TABLET | Refills: 11 | Status: SHIPPED | OUTPATIENT
Start: 2022-10-21

## 2022-10-21 RX ORDER — BETAMETHASONE DIPROPIONATE 0.5 MG/G
CREAM TOPICAL
Qty: 15 G | Refills: 0 | Status: SHIPPED | OUTPATIENT
Start: 2022-10-21

## 2022-12-05 ENCOUNTER — TELEMEDICINE (OUTPATIENT)
Dept: INTERNAL MEDICINE | Facility: CLINIC | Age: 57
End: 2022-12-05

## 2022-12-05 DIAGNOSIS — E11.65 TYPE 2 DIABETES MELLITUS WITH HYPERGLYCEMIA, WITHOUT LONG-TERM CURRENT USE OF INSULIN: ICD-10-CM

## 2022-12-05 DIAGNOSIS — M25.561 CHRONIC PAIN OF RIGHT KNEE: Primary | ICD-10-CM

## 2022-12-05 DIAGNOSIS — G89.29 CHRONIC PAIN OF RIGHT KNEE: Primary | ICD-10-CM

## 2022-12-05 DIAGNOSIS — G47.33 OSA (OBSTRUCTIVE SLEEP APNEA): ICD-10-CM

## 2022-12-05 PROCEDURE — 99214 OFFICE O/P EST MOD 30 MIN: CPT | Performed by: INTERNAL MEDICINE

## 2022-12-05 NOTE — PROGRESS NOTES
Subjective     Patient ID: Vaughn Huynh is a 57 y.o. male. Patient is here for management of multiple medical problems.     Chief Complaint   Patient presents with   • Diabetes     History of Present Illness       DM      Cardiology [facility].     Knee hurts with walking      Tired frequently.fast resting hr.        The following portions of the patient's history were reviewed and updated as appropriate: allergies, current medications, past family history, past medical history, past social history, past surgical history and problem list.    Review of Systems   Constitutional: Negative for fatigue.   Psychiatric/Behavioral: Negative for self-injury and sleep disturbance. The patient is not nervous/anxious.    All other systems reviewed and are negative.      Current Outpatient Medications:   •  apixaban (ELIQUIS) 5 MG tablet tablet, Take 1 tablet by mouth Every 12 (Twelve) Hours. Indications: Atrial Fibrillation, Disp: 180 tablet, Rfl: 3  •  aspirin 81 MG tablet, Take  by mouth daily., Disp: , Rfl:   •  betamethasone dipropionate 0.05 % cream, APPLY TOPICALLY TO THE AFFECTED AREA TWICE DAILY, Disp: 15 g, Rfl: 0  •  Diclofenac Sodium (VOLTAREN) 1 % gel gel, Apply 4 g topically to the appropriate area as directed 4 (Four) Times a Day As Needed (pain)., Disp: 100 g, Rfl: 1  •  glucose blood test strip, 1 each by Other route Daily. E11.9 Use what is covered by insurance, Disp: 100 each, Rfl: 3  •  glucose monitor monitoring kit, 1 each As Needed (bs)., Disp: 1 each, Rfl: 1  •  lisinopril-hydrochlorothiazide (PRINZIDE,ZESTORETIC) 20-12.5 MG per tablet, TAKE 1 TABLET BY MOUTH TWICE DAILY, Disp: 180 tablet, Rfl: 3  •  metFORMIN (GLUCOPHAGE) 500 MG tablet, TAKE 1 TABLET BY MOUTH TWICE DAILY WITH MEALS, Disp: 180 tablet, Rfl: 3  •  ONE TOUCH LANCETS misc, E11.9 use what is covered by insurance., Disp: 100 each, Rfl: 12  •  Rybelsus 14 MG tablet, TAKE 1 TABLET BY MOUTH DAILY, Disp: 30 tablet, Rfl: 11    Objective      There  were no vitals taken for this visit.    Physical Exam     General Appearance:    Alert, cooperative, no distress, appears stated age   Head:     Eyes:     Ears:     Nose:    Throat:    Neck:    Back:      Lungs:      Chest wall:     Heart:     Abdomen:      Extremities:    Pulses:    Skin:    Lymph nodes:    Neurologic:       Results for orders placed or performed in visit on 07/12/22   MicroAlbumin, Urine, Random -   Result Value Ref Range    Microalbumin, Urine 281.9 Not Estab. ug/mL         Assessment & Plan   Unable to complete visit using a video connection to the patient. A phone visit was used to complete this visits. Total time of discussion was 15 minutes.    Fast resting HR. elevated bs.  Neg sleep test in past, however pt reports poor sleep at the time. Willing to repeat with home sleep study.  pt followed by cardiology.  If cp at night/ any time go to er. Pt at elevated risk.       On trig lower agent.  With DR Stewart.           Diagnoses and all orders for this visit:    1. Chronic pain of right knee (Primary)  -     Ambulatory Referral to Orthopedic Surgery    2. SALVADOR (obstructive sleep apnea)  -     Home Sleep Study    3. Type 2 diabetes mellitus with hyperglycemia, without long-term current use of insulin (HCC)    get labs    Return in about 6 weeks (around 1/16/2023).          There are no Patient Instructions on file for this visit.     Sen Nichole MD    Assessment & Plan

## 2022-12-08 ENCOUNTER — OFFICE VISIT (OUTPATIENT)
Dept: ORTHOPEDIC SURGERY | Facility: CLINIC | Age: 57
End: 2022-12-08

## 2022-12-08 ENCOUNTER — TELEPHONE (OUTPATIENT)
Dept: INTERNAL MEDICINE | Facility: CLINIC | Age: 57
End: 2022-12-08

## 2022-12-08 VITALS
SYSTOLIC BLOOD PRESSURE: 120 MMHG | BODY MASS INDEX: 25.54 KG/M2 | WEIGHT: 199 LBS | HEIGHT: 74 IN | DIASTOLIC BLOOD PRESSURE: 79 MMHG

## 2022-12-08 DIAGNOSIS — M17.11 PRIMARY OSTEOARTHRITIS OF RIGHT KNEE: ICD-10-CM

## 2022-12-08 DIAGNOSIS — M25.561 CHRONIC PAIN OF RIGHT KNEE: Primary | ICD-10-CM

## 2022-12-08 DIAGNOSIS — R73.09 ELEVATED HEMOGLOBIN A1C: ICD-10-CM

## 2022-12-08 DIAGNOSIS — G89.29 CHRONIC PAIN OF RIGHT KNEE: Primary | ICD-10-CM

## 2022-12-08 DIAGNOSIS — E11.49 TYPE 2 DIABETES MELLITUS WITH OTHER NEUROLOGIC COMPLICATION, WITHOUT LONG-TERM CURRENT USE OF INSULIN: ICD-10-CM

## 2022-12-08 PROCEDURE — 99204 OFFICE O/P NEW MOD 45 MIN: CPT | Performed by: ORTHOPAEDIC SURGERY

## 2022-12-08 RX ORDER — FENOFIBRATE 200 MG/1
200 CAPSULE ORAL DAILY
COMMUNITY
Start: 2022-09-21 | End: 2023-02-14

## 2022-12-08 RX ORDER — METOPROLOL SUCCINATE 25 MG/1
25 TABLET, EXTENDED RELEASE ORAL DAILY
COMMUNITY
Start: 2022-11-23

## 2022-12-08 NOTE — PROGRESS NOTES
[facility] Note        Subjective     Pain of the Right Knee      HPI    Vaughn Huynh is a 57 y.o. male who presents with new problem of: right knee pain.  Onset: atraumatic and gradual in nature. The issue has been ongoing for 6 year(s). Pain is a 8/10 on the pain scale. Pain is described as aching and stabbing. Associated symptoms include pain, swelling and stiffness. The pain is worse with walking and climbing stairs; sitting improve the pain. Previous treatments have included: nothing.    I have reviewed the following portions of the patient's history:History of Present Illness and review of systems.    Patient is here today at the request of Dr. Nichole for evaluation of his right knee.  He is a diabetic who is also on Eliquis.  He has had difficulty with the right knee in terms of difficulty going up and down steps and he says that he has tried vigilantly but cannot get his muscles in the right leg to build up.  He saw Dr. Thakkar in 2017 who told him he was a little young for arthroplasty at that point.  The patient was also not interested.  He has had 1 round of Visco injections at the arthritis [facility] and had equivocal response only.  He has tried physical therapy.  He is here for further evaluation and treatment.      Past Medical History:   Diagnosis Date   • Adult BMI 27.0-27.9 kg/sq m    • Cerebrovascular accident (CVA) (HCC) 10/11/2019   • Diabetes mellitus (HCC)    • Elevated cholesterol    • Esophagitis    • GERD (gastroesophageal reflux disease)    • Heartburn    • Hyperlipidemia 7/18/2016   • Hypertension 7/18/2016   • Knee pain     right   • Myopathy 7/18/2016   • Osteoarthritis 07/18/2016    right knee      Past Surgical History:   Procedure Laterality Date   • BACK SURGERY  2012      Family History   Problem Relation Age of Onset   • Cancer Mother         kindney   • Dementia Father    • Hypertension Father    • Diabetes Father    • Arthritis Father    • Hyperlipidemia  Father    • Seizures Other    • Cancer Other    • Diabetes Other    • Hypertension Other    • Arthritis Other    • Hypertension Other    • Colon cancer Neg Hx    • Liver cancer Neg Hx    • Liver disease Neg Hx    • Esophageal cancer Neg Hx    • Stomach cancer Neg Hx      Social History     Socioeconomic History   • Marital status:    Tobacco Use   • Smoking status: Former     Packs/day: 1.00     Years: 15.00     Pack years: 15.00     Types: Cigarettes     Start date: 1983     Quit date: 2006     Years since quittin.9   • Smokeless tobacco: Never   • Tobacco comments:     quit x 10 years   Substance and Sexual Activity   • Alcohol use: Yes     Comment: VERY RARE    • Drug use: No   • Sexual activity: Defer      Current Outpatient Medications on File Prior to Visit   Medication Sig Dispense Refill   • apixaban (ELIQUIS) 5 MG tablet tablet Take 1 tablet by mouth Every 12 (Twelve) Hours. Indications: Atrial Fibrillation 180 tablet 3   • aspirin 81 MG tablet Take  by mouth daily.     • betamethasone dipropionate 0.05 % cream APPLY TOPICALLY TO THE AFFECTED AREA TWICE DAILY 15 g 0   • Diclofenac Sodium (VOLTAREN) 1 % gel gel Apply 4 g topically to the appropriate area as directed 4 (Four) Times a Day As Needed (pain). 100 g 1   • fenofibrate micronized (LOFIBRA) 200 MG capsule Take 200 mg by mouth Daily.     • glucose blood test strip 1 each by Other route Daily. E11.9 Use what is covered by insurance 100 each 3   • glucose monitor monitoring kit 1 each As Needed (bs). 1 each 1   • lisinopril-hydrochlorothiazide (PRINZIDE,ZESTORETIC) 20-12.5 MG per tablet TAKE 1 TABLET BY MOUTH TWICE DAILY 180 tablet 3   • metFORMIN (GLUCOPHAGE) 500 MG tablet TAKE 1 TABLET BY MOUTH TWICE DAILY WITH MEALS 180 tablet 3   • metoprolol succinate XL (TOPROL-XL) 25 MG 24 hr tablet Take 25 mg by mouth Daily.     • ONE TOUCH LANCETS [facility] E11.9 use what is covered by insurance. 100 each 12   • Rybelsus 14 MG tablet TAKE 1  "TABLET BY MOUTH DAILY 30 tablet 11     No current facility-administered medications on file prior to visit.      Allergies   Allergen Reactions   • Statins Unknown - Low Severity     Other reaction(s): Rhabdomyolysis          Review of Systems     I reviewed the patient's chief complaint, history of present illness, review of systems, past medical history, surgical history, family history, social history, medications and allergy list.        Objective      Physical Exam  /79   Ht 188 cm (74\")   Wt 90.3 kg (199 lb)   BMI 25.55 kg/m²     Body mass index is 25.55 kg/m².    General  Mental Status - alert  General Appearance - cooperative, well groomed, not in acute distress  Orientation - Oriented X3  Build & Nutrition - well developed and well nourished  Posture - normal posture  Gait - normal gait       Ortho Exam      Musculoskeletal:  Global Assessment:  Overall assessment of Lower Extremity Muscle Strength and Tone:  Right quadriceps--5/5   Right hamstrings--5/5       Right tibialis anterior--5/5  Right gastroc-soleus--5/5  Right EHL --5/5    Lower Extremity:    Inspection and Palpation:  Right knee:  Tenderness:  Over the medial joint line and moderate severity  Effusion:  1+  Crepitus:  Positive  Pulses:  2+  Ecchymosis:  None  Warmth:  None     ROM:  Right:  Extension: 5    Flexion:120    Instability:    Right:  Lachman Test:  Negative   Varus stress test negative   Valgus stress test negative    Deformities/Malalignments/Discrepancies:    Left:  No deformities   Right:  Genu Varum    Functional Testing:  Laura's test:  Negative  Patella grind test:  Positive  Q-angle:  normal          Imaging/Studies  Imaging Results (Last 24 Hours)     Procedure Component Value Units Date/Time    XR Knee 4+ View Right [[id number]] Resulted: 12/08/22 1240     Updated: 12/08/22 1241    Narrative:      Knee X-Ray    Indication: Pain    Study:  Upright AP, Skiers, Lateral, and Sunrise views of Right " knee(s)    Comparison: Right knee 12/4/2017    Findings:    Patient appears to have severe hypertrophic degenerative changes in the   medial compartment.    There are mild degenerative changes in the lateral compartment.    There are severe changes in the patellofemoral compartment.    Patient has overall varus alignment.    Kellgren-Apolinar thGthrthathdtheth:th th4th Impression:   Severe hypertrophic medial compartment and patellofemoral compartment   degnerative changes of the knee.  When compared to the prior study, there   has been interval significant development in the severity of   patellofemoral arthritis.          We reviewed hemoglobin A1c level from 7/11/2022.  8.3%.  2 years ago, his level was 6.7%.    Assessment    Assessment:  1. Chronic pain of right knee    2. Type 2 diabetes mellitus with other neurologic complication, without long-term current use of insulin (HCC)    3. Primary osteoarthritis of right knee        Plan:  1. Continue over-the-counter medication as needed for discomfort  2. Chronic pain right knee with severe osteoarthritis of the medial and patellofemoral compartments in a patient on Eliquis who also has diabetes and an elevated hemoglobin A1c --plan will be for a custom medial compartment offloading brace.  He has disproportionate size of his thigh and calf and I do not believe an over-the-counter brace is going to fit him appropriately.  We will also try to get him approved for a Visco supplement and see him back for injection #1.        Ramon Gillespie MD  12/08/22  13:48 EST      Dictated Utilizing Dragon Dictation.

## 2023-01-20 DIAGNOSIS — G47.33 OSA (OBSTRUCTIVE SLEEP APNEA): Primary | ICD-10-CM

## 2023-01-25 NOTE — PROGRESS NOTES
Procedure Note:    I discussed with the patient the potential benefits of performing a therapeutic injections as well as potential risks including but not limited to infection, swelling, pain, bleeding, bruising, nerve/vessel damage, skin color changes, transient elevation in blood glucose levels, and fat atrophy. After informed consent and after the areas were prepped with chlorhexadine soap, ethyl chloride was used to numb the skin. Via the superiorlateral approach, 3mL of 1% lidocaine followed by Orthovisc #1 were each injected into the right knee joint. The patient tolerated the procedure well. There were no complications. A sterile dressing was placed over the injection sites.

## 2023-01-26 ENCOUNTER — CLINICAL SUPPORT (OUTPATIENT)
Dept: ORTHOPEDIC SURGERY | Facility: CLINIC | Age: 58
End: 2023-01-26
Payer: COMMERCIAL

## 2023-01-26 DIAGNOSIS — M17.11 PRIMARY OSTEOARTHRITIS OF RIGHT KNEE: Primary | ICD-10-CM

## 2023-01-26 PROCEDURE — 20610 DRAIN/INJ JOINT/BURSA W/O US: CPT | Performed by: ORTHOPAEDIC SURGERY

## 2023-01-26 RX ORDER — LIDOCAINE HYDROCHLORIDE 10 MG/ML
3 INJECTION, SOLUTION EPIDURAL; INFILTRATION; INTRACAUDAL; PERINEURAL
Status: COMPLETED | OUTPATIENT
Start: 2023-01-26 | End: 2023-01-26

## 2023-01-26 RX ADMIN — LIDOCAINE HYDROCHLORIDE 3 ML: 10 INJECTION, SOLUTION EPIDURAL; INFILTRATION; INTRACAUDAL; PERINEURAL at 09:04

## 2023-01-26 NOTE — PROGRESS NOTES
Procedure   - Large Joint Arthrocentesis: R knee on 1/26/2023 9:04 AM  Indications: pain  Details: (23g) needle, anterolateral approach  Medications: 30 mg Hyaluronan 30 MG/2ML; 3 mL lidocaine PF 1% 1 %  Outcome: tolerated well, no immediate complications  Procedure, treatment alternatives, risks and benefits explained, specific risks discussed. Consent was given by the patient. Immediately prior to procedure a time out was called to verify the correct patient, procedure, equipment, support staff and site/side marked as required. Patient was prepped and draped in the usual sterile fashion.

## 2023-02-02 ENCOUNTER — CLINICAL SUPPORT (OUTPATIENT)
Dept: ORTHOPEDIC SURGERY | Facility: CLINIC | Age: 58
End: 2023-02-02
Payer: COMMERCIAL

## 2023-02-02 DIAGNOSIS — M17.11 PRIMARY OSTEOARTHRITIS OF RIGHT KNEE: Primary | ICD-10-CM

## 2023-02-02 PROCEDURE — 20610 DRAIN/INJ JOINT/BURSA W/O US: CPT | Performed by: ORTHOPAEDIC SURGERY

## 2023-02-02 RX ORDER — LIDOCAINE HYDROCHLORIDE 10 MG/ML
3 INJECTION, SOLUTION EPIDURAL; INFILTRATION; INTRACAUDAL; PERINEURAL
Status: COMPLETED | OUTPATIENT
Start: 2023-02-02 | End: 2023-02-02

## 2023-02-02 RX ADMIN — LIDOCAINE HYDROCHLORIDE 3 ML: 10 INJECTION, SOLUTION EPIDURAL; INFILTRATION; INTRACAUDAL; PERINEURAL at 09:30

## 2023-02-02 NOTE — PROGRESS NOTES
Procedure   Large Joint Arthrocentesis: R knee  Date/Time: 2/2/2023 9:30 AM  Consent given by: patient  Site marked: site marked  Timeout: Immediately prior to procedure a time out was called to verify the correct patient, procedure, equipment, support staff and site/side marked as required   Supporting Documentation  Indications: pain   Procedure Details  Location: knee - R knee  Preparation: Patient was prepped and draped in the usual sterile fashion  Needle size: 23 G  Approach: anterolateral  Medications administered: 30 mg Hyaluronan 30 MG/2ML; 3 mL lidocaine PF 1% 1 %  Patient tolerance: patient tolerated the procedure well with no immediate complications

## 2023-02-09 ENCOUNTER — CLINICAL SUPPORT (OUTPATIENT)
Dept: ORTHOPEDIC SURGERY | Facility: CLINIC | Age: 58
End: 2023-02-09
Payer: COMMERCIAL

## 2023-02-09 DIAGNOSIS — M17.11 PRIMARY OSTEOARTHRITIS OF RIGHT KNEE: Primary | ICD-10-CM

## 2023-02-09 PROCEDURE — 20610 DRAIN/INJ JOINT/BURSA W/O US: CPT | Performed by: ORTHOPAEDIC SURGERY

## 2023-02-09 RX ORDER — LIDOCAINE HYDROCHLORIDE 10 MG/ML
3 INJECTION, SOLUTION EPIDURAL; INFILTRATION; INTRACAUDAL; PERINEURAL
Status: COMPLETED | OUTPATIENT
Start: 2023-02-09 | End: 2023-02-09

## 2023-02-09 RX ADMIN — LIDOCAINE HYDROCHLORIDE 3 ML: 10 INJECTION, SOLUTION EPIDURAL; INFILTRATION; INTRACAUDAL; PERINEURAL at 09:21

## 2023-02-09 NOTE — PROGRESS NOTES
Procedure   - Large Joint Arthrocentesis: R knee on 2/9/2023 9:21 AM  Indications: pain  Details: (23g) needle, anterolateral approach  Medications: 30 mg Hyaluronan 30 MG/2ML; 3 mL lidocaine PF 1% 1 %  Outcome: tolerated well, no immediate complications  Procedure, treatment alternatives, risks and benefits explained, specific risks discussed. Consent was given by the patient. Immediately prior to procedure a time out was called to verify the correct patient, procedure, equipment, support staff and site/side marked as required. Patient was prepped and draped in the usual sterile fashion.

## 2023-02-14 ENCOUNTER — OFFICE VISIT (OUTPATIENT)
Dept: NEUROLOGY | Facility: CLINIC | Age: 58
End: 2023-02-14
Payer: COMMERCIAL

## 2023-02-14 VITALS
HEIGHT: 74 IN | TEMPERATURE: 97.7 F | OXYGEN SATURATION: 96 % | WEIGHT: 202.2 LBS | SYSTOLIC BLOOD PRESSURE: 122 MMHG | HEART RATE: 107 BPM | BODY MASS INDEX: 25.95 KG/M2 | DIASTOLIC BLOOD PRESSURE: 70 MMHG

## 2023-02-14 DIAGNOSIS — G47.33 OBSTRUCTIVE SLEEP APNEA: Primary | ICD-10-CM

## 2023-02-14 PROCEDURE — 99214 OFFICE O/P EST MOD 30 MIN: CPT | Performed by: NURSE PRACTITIONER

## 2023-02-14 NOTE — PROGRESS NOTES
New Sleep Patient Office Visit      Patient Name: Vaughn Huynh  : 1965   MRN: [id number]     Referring Physician: Sen Nichole MD    Chief Complaint:    Chief Complaint   Patient presents with   • Advice Only     New patient in office to establish care for SALVADOR        History of Present Illness: Vaughn Huynh is a 57 y.o. male who is here today to establish care with Sleep Medicine for SALVADOR.  He says he was on CPAP therapy 5-6 years ago but could not keep the machine due to poor compliance.  Sleep questionnaire reviewed- it takes him 20-60 minutes to fall asleep at night, he wakes up a couple of times during sleep and has some difficulty falling back to sleep, he sleeps 7 hours per night, he experiences restless or disturbed sleep, he normally feels well-rested upon awaking, he snores when sleeping on his back, he has been told he stops breathing during sleep, he wakes up with a dry mouth, he experiences decreased concentration and decreased libido, he has some leg kicking/leg movements while asleep and moving his legs relieves symptoms, he has had some vivid dreams, he has knee pain that interferes with sleep on some nights, he has heartburn that interferes with sleep on some nights.  Additional risk factors- BMI 25, HTN, diabetes, history of spine surgery.   *Home sleep study on 23 showed evidence of moderate SALVADOR with an AHI of 15/hour and lowest oxygen saturation of 77%.      Davilla Score: 4    Subjective      Review of Systems:   Review of Systems   Constitutional: Negative for fatigue, fever, unexpected weight gain and unexpected weight loss.   HENT: Negative for hearing loss, sore throat, swollen glands, tinnitus and trouble swallowing.    Eyes: Negative for blurred vision, double vision, photophobia and visual disturbance.   Respiratory: Positive for apnea. Negative for cough, chest tightness and shortness of breath.    Cardiovascular: Negative for chest pain, palpitations and leg  swelling.   Gastrointestinal: Negative for constipation, diarrhea and nausea.   Endocrine: Negative for cold intolerance and heat intolerance.   Musculoskeletal: Negative for gait problem, neck pain and neck stiffness.   Skin: Negative for color change and rash.   Allergic/Immunologic: Negative for environmental allergies and food allergies.   Neurological: Negative for dizziness, syncope, facial asymmetry, speech difficulty, weakness, headache, memory problem and confusion.   Psychiatric/Behavioral: Negative for agitation, behavioral problems and depressed mood. The patient is not nervous/anxious.        Past Medical History:   Past Medical History:   Diagnosis Date   • Adult BMI 27.0-27.9 kg/sq m    • Arthritis    • Cerebrovascular accident (CVA) ([facility]) 10/11/2019   • Diabetes mellitus ([facility])    • Elevated cholesterol    • Esophagitis    • GERD (gastroesophageal reflux disease)    • Heartburn    • High blood triglycerides    • Hyperlipidemia 07/18/2016   • Hypertension 07/18/2016   • Knee pain     right   • Myopathy 07/18/2016   • Osteoarthritis 07/18/2016    right knee       Past Surgical History:   Past Surgical History:   Procedure Laterality Date   • BACK SURGERY  2012       Family History:   Family History   Problem Relation Age of Onset   • Cancer Mother         kindney   • Dementia Father    • Hypertension Father    • Diabetes Father    • Arthritis Father    • Hyperlipidemia Father    • Parkinsonism Father    • Seizures Other    • Cancer Other    • Diabetes Other    • Hypertension Other    • Arthritis Other    • Hypertension Other    • Colon cancer Neg Hx    • Liver cancer Neg Hx    • Liver disease Neg Hx    • Esophageal cancer Neg Hx    • Stomach cancer Neg Hx        Social History:   Social History     Socioeconomic History   • Marital status:    Tobacco Use   • Smoking status: Former     Packs/day: 1.00     Years: 15.00     Pack years: 15.00     Types: Cigarettes     Start date: 1/8/1983     Quit  "date: 2006     Years since quittin.1   • Smokeless tobacco: Never   • Tobacco comments:     quit x 10 years   Vaping Use   • Vaping Use: Never used   Substance and Sexual Activity   • Alcohol use: Yes     Comment: VERY RARE    • Drug use: No   • Sexual activity: Defer       Medications:     Current Outpatient Medications:   •  apixaban (ELIQUIS) 5 MG tablet tablet, Take 1 tablet by mouth Every 12 (Twelve) Hours. Indications: Atrial Fibrillation, Disp: 180 tablet, Rfl: 3  •  aspirin 81 MG tablet, Take  by mouth daily., Disp: , Rfl:   •  betamethasone dipropionate 0.05 % cream, APPLY TOPICALLY TO THE AFFECTED AREA TWICE DAILY, Disp: 15 g, Rfl: 0  •  glucose blood test strip, 1 each by Other route Daily. E11.9 Use what is covered by insurance, Disp: 100 each, Rfl: 3  •  glucose monitor monitoring kit, 1 each As Needed (bs)., Disp: 1 each, Rfl: 1  •  lisinopril-hydrochlorothiazide (PRINZIDE,ZESTORETIC) 20-12.5 MG per tablet, TAKE 1 TABLET BY MOUTH TWICE DAILY, Disp: 180 tablet, Rfl: 3  •  metFORMIN (GLUCOPHAGE) 500 MG tablet, TAKE 1 TABLET BY MOUTH TWICE DAILY WITH MEALS, Disp: 180 tablet, Rfl: 3  •  metoprolol succinate XL (TOPROL-XL) 25 MG 24 hr tablet, Take 25 mg by mouth Daily., Disp: , Rfl:   •  ONE TOUCH LANCETS misc, E11.9 use what is covered by insurance., Disp: 100 each, Rfl: 12  •  Rybelsus 14 MG tablet, TAKE 1 TABLET BY MOUTH DAILY, Disp: 30 tablet, Rfl: 11    Allergies:   Allergies   Allergen Reactions   • Statins Unknown - Low Severity     Other reaction(s): Rhabdomyolysis       Objective     Physical Exam:  Vital Signs:   Vitals:    23 0949   BP: 122/70   BP Location: Right arm   Patient Position: Sitting   Cuff Size: Adult   Pulse: 107   Temp: 97.7 °F (36.5 °C)   TempSrc: Temporal   SpO2: 96%   Weight: 91.7 kg (202 lb 3.2 oz)   Height: 188 cm (74.02\")   PainSc: 0-No pain     BMI: Body mass index is 25.95 kg/m².  Neck Circumference: 15 1/2    Physical Exam  Vitals and nursing note reviewed. "   Constitutional:       General: He is not in acute distress.     Appearance: He is well-developed. He is not diaphoretic.   HENT:      Head: Normocephalic and atraumatic.      Comments: Mallampati 4  Eyes:      Extraocular Movements: Extraocular movements intact.      Conjunctiva/sclera: Conjunctivae normal.      Pupils: Pupils are equal, round, and reactive to light.   Neck:      Trachea: Trachea normal.   Pulmonary:      Effort: Pulmonary effort is normal. No respiratory distress.   Musculoskeletal:         General: Normal range of motion.   Skin:     General: Skin is warm and dry.      Findings: No rash.   Neurological:      Mental Status: He is alert and oriented to person, place, and time.   Psychiatric:         Mood and Affect: Mood normal.         Behavior: Behavior normal.         Thought Content: Thought content normal.         Judgment: Judgment normal.         Assessment / Plan      Assessment/Plan:   Diagnoses and all orders for this visit:    1. Obstructive sleep apnea (Primary)    2. BMI 25.0-25.9,adult    *Education on SALVADOR provided today.   *Advised patient to avoid driving if drowsy.  *Order for [facility]Pap 6-16cm [facility] to [facility].     Follow Up:   Return in about 3 months (around 5/14/2023) for F/U Obstructive Sleep Apnea.    I have advised the patient the need to continue the use of CPAP.  Gold standard for treatment of sleep apnea includes weight loss, use of cpap and avoidance of alcohol.  Untreated SALVADOR may increase the risk for development of hypertension, stroke, myocardial infarction, diabetes, cardiovascular disease, work-related issues and driving accidents. I have counseled and advised the patient to avoid driving or operating heavy/dangerous equipment if feeling drowsy.     MYRNA Machado, FNP-C  [facility] Neurology and Sleep Medicine       Please note that portions of this note may have been completed with a voice recognition program. Efforts were made to edit  the dictations, but occasionally words are mistranscribed.

## 2023-02-21 ENCOUNTER — PATIENT ROUNDING (BHMG ONLY) (OUTPATIENT)
Dept: NEUROLOGY | Facility: CLINIC | Age: 58
End: 2023-02-21
Payer: COMMERCIAL

## 2023-03-08 DIAGNOSIS — E11.9 TYPE 2 DIABETES MELLITUS WITHOUT COMPLICATION, WITHOUT LONG-TERM CURRENT USE OF INSULIN: ICD-10-CM

## 2023-04-10 RX ORDER — BLOOD SUGAR DIAGNOSTIC
STRIP MISCELLANEOUS
Qty: 200 EACH | Refills: 1 | Status: SHIPPED | OUTPATIENT
Start: 2023-04-10

## 2023-06-14 ENCOUNTER — TELEPHONE (OUTPATIENT)
Dept: INTERNAL MEDICINE | Facility: CLINIC | Age: 58
End: 2023-06-14
Payer: COMMERCIAL

## 2023-06-14 NOTE — TELEPHONE ENCOUNTER
Caller: Vaughn Huynh    Relationship: Self    Best call back number: [phone]     What is the medical concern/diagnosis: DIABETIC    What specialty or service is being requested: EYE DR APPOINTMENT FOR DIABETIC CONCERNS        Any additional details: PATIENT PREFERS TO GO SOMEWHERE IN [place], WHERE DR COTTER RECOMMENDS.

## 2023-08-21 DIAGNOSIS — E78.00 PURE HYPERCHOLESTEROLEMIA: ICD-10-CM

## 2023-08-21 DIAGNOSIS — Z12.5 PROSTATE CANCER SCREENING: ICD-10-CM

## 2023-08-21 DIAGNOSIS — I10 ESSENTIAL HYPERTENSION: ICD-10-CM

## 2023-08-21 DIAGNOSIS — E11.9 TYPE 2 DIABETES MELLITUS WITHOUT COMPLICATION, WITHOUT LONG-TERM CURRENT USE OF INSULIN: Primary | ICD-10-CM

## 2023-08-22 LAB
ALBUMIN SERPL-MCNC: 4.6 G/DL (ref 3.5–5.2)
ALBUMIN/GLOB SERPL: 2.2 G/DL
ALP SERPL-CCNC: 84 U/L (ref 39–117)
ALT SERPL-CCNC: 27 U/L (ref 1–41)
AST SERPL-CCNC: 15 U/L (ref 1–40)
BASOPHILS # BLD AUTO: 0.06 10*3/MM3 (ref 0–0.2)
BASOPHILS NFR BLD AUTO: 1.1 % (ref 0–1.5)
BILIRUB SERPL-MCNC: 0.4 MG/DL (ref 0–1.2)
BUN SERPL-MCNC: 26 MG/DL (ref 6–20)
BUN/CREAT SERPL: 25.7 (ref 7–25)
CALCIUM SERPL-MCNC: 9.7 MG/DL (ref 8.6–10.5)
CHLORIDE SERPL-SCNC: 94 MMOL/L (ref 98–107)
CHOLEST SERPL-MCNC: 186 MG/DL (ref 0–200)
CO2 SERPL-SCNC: 24.9 MMOL/L (ref 22–29)
CREAT SERPL-MCNC: 1.01 MG/DL (ref 0.76–1.27)
EGFRCR SERPLBLD CKD-EPI 2021: 86.7 ML/MIN/1.73
EOSINOPHIL # BLD AUTO: 0.2 10*3/MM3 (ref 0–0.4)
EOSINOPHIL NFR BLD AUTO: 3.8 % (ref 0.3–6.2)
ERYTHROCYTE [DISTWIDTH] IN BLOOD BY AUTOMATED COUNT: 12.6 % (ref 12.3–15.4)
GLOBULIN SER CALC-MCNC: 2.1 GM/DL
GLUCOSE SERPL-MCNC: 329 MG/DL (ref 65–99)
HBA1C MFR BLD: 10.8 % (ref 4.8–5.6)
HCT VFR BLD AUTO: 44.4 % (ref 37.5–51)
HDLC SERPL-MCNC: 26 MG/DL (ref 40–60)
HGB BLD-MCNC: 14.7 G/DL (ref 13–17.7)
IMM GRANULOCYTES # BLD AUTO: 0.04 10*3/MM3 (ref 0–0.05)
IMM GRANULOCYTES NFR BLD AUTO: 0.8 % (ref 0–0.5)
LDLC SERPL CALC-MCNC: 54 MG/DL (ref 0–100)
LYMPHOCYTES # BLD AUTO: 1.4 10*3/MM3 (ref 0.7–3.1)
LYMPHOCYTES NFR BLD AUTO: 26.8 % (ref 19.6–45.3)
MCH RBC QN AUTO: 28.2 PG (ref 26.6–33)
MCHC RBC AUTO-ENTMCNC: 33.1 G/DL (ref 31.5–35.7)
MCV RBC AUTO: 85.1 FL (ref 79–97)
MICROALBUMIN UR-MCNC: 16.9 UG/ML
MONOCYTES # BLD AUTO: 0.39 10*3/MM3 (ref 0.1–0.9)
MONOCYTES NFR BLD AUTO: 7.5 % (ref 5–12)
NEUTROPHILS # BLD AUTO: 3.13 10*3/MM3 (ref 1.7–7)
NEUTROPHILS NFR BLD AUTO: 60 % (ref 42.7–76)
NRBC BLD AUTO-RTO: 0 /100 WBC (ref 0–0.2)
PLATELET # BLD AUTO: 274 10*3/MM3 (ref 140–450)
POTASSIUM SERPL-SCNC: 4.5 MMOL/L (ref 3.5–5.2)
PROT SERPL-MCNC: 6.7 G/DL (ref 6–8.5)
PSA SERPL-MCNC: 1.13 NG/ML (ref 0–4)
RBC # BLD AUTO: 5.22 10*6/MM3 (ref 4.14–5.8)
SODIUM SERPL-SCNC: 132 MMOL/L (ref 136–145)
T4 FREE SERPL-MCNC: 1.02 NG/DL (ref 0.93–1.7)
TRIGL SERPL-MCNC: 720 MG/DL (ref 0–150)
TSH SERPL DL<=0.005 MIU/L-ACNC: 1.36 UIU/ML (ref 0.27–4.2)
VIT B12 SERPL-MCNC: 334 PG/ML (ref 211–946)
VLDLC SERPL CALC-MCNC: 106 MG/DL (ref 5–40)
WBC # BLD AUTO: 5.22 10*3/MM3 (ref 3.4–10.8)

## 2023-08-23 ENCOUNTER — OFFICE VISIT (OUTPATIENT)
Dept: INTERNAL MEDICINE | Facility: CLINIC | Age: 58
End: 2023-08-23
Payer: COMMERCIAL

## 2023-08-23 VITALS
RESPIRATION RATE: 16 BRPM | HEIGHT: 74 IN | TEMPERATURE: 97.6 F | DIASTOLIC BLOOD PRESSURE: 74 MMHG | SYSTOLIC BLOOD PRESSURE: 122 MMHG | BODY MASS INDEX: 25.15 KG/M2 | OXYGEN SATURATION: 98 % | WEIGHT: 196 LBS | HEART RATE: 97 BPM

## 2023-08-23 DIAGNOSIS — G89.29 CHRONIC PAIN OF RIGHT KNEE: ICD-10-CM

## 2023-08-23 DIAGNOSIS — E11.65 TYPE 2 DIABETES MELLITUS WITH HYPERGLYCEMIA, WITHOUT LONG-TERM CURRENT USE OF INSULIN: Primary | ICD-10-CM

## 2023-08-23 DIAGNOSIS — M25.561 CHRONIC PAIN OF RIGHT KNEE: ICD-10-CM

## 2023-08-23 PROCEDURE — 99214 OFFICE O/P EST MOD 30 MIN: CPT | Performed by: INTERNAL MEDICINE

## 2023-08-23 RX ORDER — FENOFIBRATE 200 MG/1
200 CAPSULE ORAL
COMMUNITY
Start: 2023-08-16

## 2023-08-23 RX ORDER — ROSUVASTATIN CALCIUM 20 MG/1
1 TABLET, COATED ORAL DAILY
COMMUNITY
Start: 2023-08-07

## 2023-08-23 NOTE — PROGRESS NOTES
Subjective     Patient ID: Vaughn Huynh is a 57 y.o. male. Patient is here for management of multiple medical problems.     Chief Complaint   Patient presents with    Diabetes     History of Present Illness   Dm      The following portions of the patient's history were reviewed and updated as appropriate: allergies, current medications, past family history, past medical history, past social history, past surgical history and problem list.    Review of Systems    Current Outpatient Medications:     apixaban (ELIQUIS) 5 MG tablet tablet, Take 1 tablet by mouth Every 12 (Twelve) Hours. Indications: Atrial Fibrillation, Disp: 180 tablet, Rfl: 3    aspirin 81 MG tablet, Take  by mouth daily., Disp: , Rfl:     betamethasone dipropionate 0.05 % cream, Apply  topically to the appropriate area as directed See Admin Instructions. Apply topically to the affected area twice daily, Disp: 15 g, Rfl: 0    fenofibrate micronized (LOFIBRA) 200 MG capsule, Take 1 capsule by mouth Every Morning Before Breakfast., Disp: , Rfl:     glucose monitor monitoring kit, 1 each As Needed (bs)., Disp: 1 each, Rfl: 1    lisinopril-hydrochlorothiazide (PRINZIDE,ZESTORETIC) 20-12.5 MG per tablet, TAKE 1 TABLET BY MOUTH TWICE DAILY, Disp: 180 tablet, Rfl: 3    lisinopril-hydrochlorothiazide (PRINZIDE,ZESTORETIC) 20-12.5 MG per tablet, Take 1 tablet by mouth 2 (Two) Times a Day., Disp: 180 tablet, Rfl: 3    metFORMIN (GLUCOPHAGE) 500 MG tablet, TAKE 1 TABLET BY MOUTH TWICE DAILY WITH MEALS, Disp: 180 tablet, Rfl: 3    metFORMIN (GLUCOPHAGE) 500 MG tablet, Take 1 tablet by mouth 2 (Two) Times a Day With Meals., Disp: 180 tablet, Rfl: 3    metoprolol succinate XL (TOPROL-XL) 25 MG 24 hr tablet, Take 1 tablet by mouth Daily., Disp: 90 tablet, Rfl: 3    ONE TOUCH LANCETS misc, E11.9 use what is covered by insurance., Disp: 100 each, Rfl: 12    OneTouch Ultra test strip, TEST DAILY AS DIRECTED, Disp: 200 each, Rfl: 1    rosuvastatin (CRESTOR) 20 MG  "tablet, Take 1 tablet by mouth Daily., Disp: , Rfl:     Rybelsus 14 MG tablet, TAKE 1 TABLET BY MOUTH DAILY (Patient taking differently: Take 0.5 tablets by mouth Daily.), Disp: 30 tablet, Rfl: 11    Objective      Blood pressure 122/74, pulse 97, temperature 97.6 øF (36.4 øC), resp. rate 16, height 188 cm (74.02\"), weight 88.9 kg (196 lb), SpO2 98 %.    Physical Exam     General Appearance:    Alert, cooperative, no distress, appears stated age   Head:    Normocephalic, without obvious abnormality, atraumatic   Eyes:    PERRL, conjunctiva/corneas clear, EOM's intact   Ears:    Normal TM's and external ear canals, both ears   Nose:   Nares normal, septum midline, mucosa normal, no drainage   or sinus tenderness   Throat:   Lips, mucosa, and tongue normal; teeth and gums normal   Neck:   Supple, symmetrical, trachea midline, no adenopathy;        thyroid:  No enlargement/tenderness/nodules; no carotid    bruit or JVD   Back:     Symmetric, no curvature, ROM normal, no CVA tenderness   Lungs:     Clear to auscultation bilaterally, respirations unlabored   Chest wall:    No tenderness or deformity   Heart:    Regular rate and rhythm, S1 and S2 normal, no murmur,        rub or gallop   Abdomen:     Soft, non-tender, bowel sounds active all four quadrants,     no masses, no organomegaly   Extremities:   Extremities normal, atraumatic, no cyanosis or edema   Pulses:   2+ and symmetric all extremities   Skin:   Skin color, texture, turgor normal, no rashes or lesions   Lymph nodes:   Cervical, supraclavicular, and axillary nodes normal   Neurologic:   CNII-XII intact. Normal strength, sensation and reflexes       throughout      Results for orders placed or performed in visit on 08/21/23   PSA Screen    Specimen: Blood   Result Value Ref Range    PSA 1.130 0.000 - 4.000 ng/mL   Lipid Panel    Specimen: Blood   Result Value Ref Range    Total Cholesterol 186 0 - 200 mg/dL    Triglycerides 720 (H) 0 - 150 mg/dL    HDL " Cholesterol 26 (L) 40 - 60 mg/dL    VLDL Cholesterol Palmer 106 (H) 5 - 40 mg/dL    LDL Chol Calc (NIH) 54 0 - 100 mg/dL   Comprehensive Metabolic Panel    Specimen: Blood   Result Value Ref Range    Glucose 329 (H) 65 - 99 mg/dL    BUN 26 (H) 6 - 20 mg/dL    Creatinine 1.01 0.76 - 1.27 mg/dL    EGFR Result 86.7 >60.0 mL/min/1.73    BUN/Creatinine Ratio 25.7 (H) 7.0 - 25.0    Sodium 132 (L) 136 - 145 mmol/L    Potassium 4.5 3.5 - 5.2 mmol/L    Chloride 94 (L) 98 - 107 mmol/L    Total CO2 24.9 22.0 - 29.0 mmol/L    Calcium 9.7 8.6 - 10.5 mg/dL    Total Protein 6.7 6.0 - 8.5 g/dL    Albumin 4.6 3.5 - 5.2 g/dL    Globulin 2.1 gm/dL    A/G Ratio 2.2 g/dL    Total Bilirubin 0.4 0.0 - 1.2 mg/dL    Alkaline Phosphatase 84 39 - 117 U/L    AST (SGOT) 15 1 - 40 U/L    ALT (SGPT) 27 1 - 41 U/L   Vitamin B12    Specimen: Blood   Result Value Ref Range    Vitamin B-12 334 211 - 946 pg/mL   TSH    Specimen: Blood   Result Value Ref Range    TSH 1.360 0.270 - 4.200 uIU/mL   T4, Free    Specimen: Blood   Result Value Ref Range    Free T4 1.02 0.93 - 1.70 ng/dL   Hemoglobin A1c    Specimen: Blood   Result Value Ref Range    Hemoglobin A1C 10.80 (H) 4.80 - 5.60 %   MicroAlbumin, Urine, Random - Urine, Clean Catch    Specimen: Urine, Clean Catch   Result Value Ref Range    Microalbumin, Urine 16.9 Not Estab. ug/mL   CBC & Differential    Specimen: Blood   Result Value Ref Range    WBC 5.22 3.40 - 10.80 10*3/mm3    RBC 5.22 4.14 - 5.80 10*6/mm3    Hemoglobin 14.7 13.0 - 17.7 g/dL    Hematocrit 44.4 37.5 - 51.0 %    MCV 85.1 79.0 - 97.0 fL    MCH 28.2 26.6 - 33.0 pg    MCHC 33.1 31.5 - 35.7 g/dL    RDW 12.6 12.3 - 15.4 %    Platelets 274 140 - 450 10*3/mm3    Neutrophil Rel % 60.0 42.7 - 76.0 %    Lymphocyte Rel % 26.8 19.6 - 45.3 %    Monocyte Rel % 7.5 5.0 - 12.0 %    Eosinophil Rel % 3.8 0.3 - 6.2 %    Basophil Rel % 1.1 0.0 - 1.5 %    Neutrophils Absolute 3.13 1.70 - 7.00 10*3/mm3    Lymphocytes Absolute 1.40 0.70 - 3.10 10*3/mm3     Monocytes Absolute 0.39 0.10 - 0.90 10*3/mm3    Eosinophils Absolute 0.20 0.00 - 0.40 10*3/mm3    Basophils Absolute 0.06 0.00 - 0.20 10*3/mm3    Immature Granulocyte Rel % 0.8 (H) 0.0 - 0.5 %    Immature Grans Absolute 0.04 0.00 - 0.05 10*3/mm3    nRBC 0.0 0.0 - 0.2 /100 WBC         Assessment & Plan     Diet and exercise discussed in detail with wife present. Pt is ready to work.      Diagnoses and all orders for this visit:    1. Type 2 diabetes mellitus with hyperglycemia, without long-term current use of insulin (Primary)  -     Ambulatory Referral to Optometry  -     Ambulatory Referral to Podiatry    2. Chronic pain of right knee  -     Ambulatory Referral to Physical Therapy Evaluate and treat; Cross Fiber; Stretching, ROM, Strengthening      Return in about 6 weeks (around 10/4/2023).          There are no Patient Instructions on file for this visit.     Sen Nichole MD    Assessment & Plan     Answers submitted by the patient for this visit:  Primary Reason for Visit (Submitted on 8/22/2023)  What is the primary reason for your visit?: Diabetes

## 2023-09-05 ENCOUNTER — OFFICE VISIT (OUTPATIENT)
Dept: NEUROLOGY | Facility: CLINIC | Age: 58
End: 2023-09-05
Payer: COMMERCIAL

## 2023-09-05 VITALS
OXYGEN SATURATION: 98 % | HEART RATE: 101 BPM | SYSTOLIC BLOOD PRESSURE: 100 MMHG | DIASTOLIC BLOOD PRESSURE: 78 MMHG | WEIGHT: 195 LBS | TEMPERATURE: 96.2 F | HEIGHT: 74 IN | BODY MASS INDEX: 25.03 KG/M2

## 2023-09-05 DIAGNOSIS — G47.33 OBSTRUCTIVE SLEEP APNEA: Primary | ICD-10-CM

## 2023-09-05 PROCEDURE — 99213 OFFICE O/P EST LOW 20 MIN: CPT | Performed by: NURSE PRACTITIONER

## 2023-09-05 NOTE — PROGRESS NOTES
Follow up Sleep Patient Office Visit      Patient Name: Vaughn Huynh  : 1965   MRN: [id number]     Referring Physician: No ref. provider found    Chief Complaint:    Chief Complaint   Patient presents with    Follow-up     Patient in office to follow up on antony.        History of Present Illness: Vaughn Huynh is a 57 y.o. male who is here today to follow up with ANTONY and was last seen 2023.  Currently on AutoPap 6-16cm, peak average pressure 8.0cm, compliance 90%, AHI 0.1/hour.  He is tolerating his pressures well.  He is using a nasal mask and [employer].  He is not sure he has had any symptomatic improvement since starting AutoPap therapy but then says he is waking up feeling more rested.  He is also no longer snoring.  He has no complaints or concerns today and feels he is doing well.      Pertinent Medical History:   Current compliance report reviewed and has been scanned into the patient's medical record.    Subjective      Review of Systems:   Review of Systems    Medications:     Current Outpatient Medications:     apixaban (ELIQUIS) 5 MG tablet tablet, Take 1 tablet by mouth Every 12 (Twelve) Hours. Indications: Atrial Fibrillation, Disp: 180 tablet, Rfl: 3    aspirin 81 MG tablet, Take  by mouth daily., Disp: , Rfl:     betamethasone dipropionate 0.05 % cream, Apply  topically to the appropriate area as directed See Admin Instructions. Apply topically to the affected area twice daily, Disp: 15 g, Rfl: 0    fenofibrate micronized (LOFIBRA) 200 MG capsule, Take 1 capsule by mouth Every Morning Before Breakfast., Disp: , Rfl:     glucose monitor monitoring kit, 1 each As Needed (bs)., Disp: 1 each, Rfl: 1    lisinopril-hydrochlorothiazide (PRINZIDE,ZESTORETIC) 20-12.5 MG per tablet, Take 1 tablet by mouth 2 (Two) Times a Day., Disp: 180 tablet, Rfl: 3    metFORMIN (GLUCOPHAGE) 500 MG tablet, TAKE 1 TABLET BY MOUTH TWICE DAILY WITH MEALS, Disp: 180 tablet, Rfl: 3    metoprolol succinate  "XL (TOPROL-XL) 25 MG 24 hr tablet, Take 1 tablet by mouth Daily., Disp: 90 tablet, Rfl: 3    ONE TOUCH LANCETS misc, E11.9 use what is covered by insurance., Disp: 100 each, Rfl: 12    OneTouch Ultra test strip, TEST DAILY AS DIRECTED, Disp: 200 each, Rfl: 1    rosuvastatin (CRESTOR) 20 MG tablet, Take 1 tablet by mouth Daily., Disp: , Rfl:     Rybelsus 14 MG tablet, TAKE 1 TABLET BY MOUTH DAILY (Patient taking differently: Take 0.5 tablets by mouth Daily.), Disp: 30 tablet, Rfl: 11    Allergies:   Allergies   Allergen Reactions    Statins Unknown - Low Severity     Other reaction(s): Rhabdomyolysis       Objective     Physical Exam:  Vital Signs:   Vitals:    09/05/23 1452   BP: 100/78   BP Location: Right arm   Patient Position: Sitting   Cuff Size: Adult   Pulse: 101   Temp: 96.2 °F (35.7 °C)   SpO2: 98%   Weight: 88.5 kg (195 lb)   Height: 188 cm (74.02\")   PainSc: 0-No pain     BMI: Body mass index is 25.02 kg/m².    Physical Exam  Vitals and nursing note reviewed.   Constitutional:       General: He is not in acute distress.     Appearance: Normal appearance. He is well-developed and normal weight. He is not diaphoretic.   HENT:      Head: Normocephalic and atraumatic.   Eyes:      Extraocular Movements: Extraocular movements intact.      Conjunctiva/sclera: Conjunctivae normal.   Pulmonary:      Effort: Pulmonary effort is normal. No respiratory distress.   Musculoskeletal:         General: Normal range of motion.   Skin:     General: Skin is warm and dry.   Neurological:      Mental Status: He is alert and oriented to person, place, and time.   Psychiatric:         Mood and Affect: Mood normal.         Behavior: Behavior normal.         Thought Content: Thought content normal.         Judgment: Judgment normal.       Assessment / Plan      Assessment/Plan:   Diagnoses and all orders for this visit:    1. Obstructive sleep apnea (Primary)    2. BMI 25.0-25.9,adult    *Advised patient to avoid driving if " drowsy.      Follow Up:   Return in about 6 months (around 3/5/2024) for F/U Obstructive Sleep Apnea.    I have advised the patient the need to continue the use of CPAP.  Gold standard for treatment of sleep apnea includes weight loss, use of cpap and avoidance of alcohol.  Untreated SALVADOR may increase the risk for development of hypertension, stroke, myocardial infarction, diabetes, cardiovascular disease, work-related issues and driving accidents. I have counseled and advised the patient to avoid driving or operating heavy/dangerous equipment if feeling drowsy.     MYRNA Machado, FNP-C  [facility] Neurology and Sleep Medicine

## 2023-10-23 ENCOUNTER — TELEPHONE (OUTPATIENT)
Dept: INTERNAL MEDICINE | Facility: CLINIC | Age: 58
End: 2023-10-23
Payer: COMMERCIAL

## 2023-10-23 DIAGNOSIS — E11.65 TYPE 2 DIABETES MELLITUS WITH HYPERGLYCEMIA, WITHOUT LONG-TERM CURRENT USE OF INSULIN: Primary | ICD-10-CM

## 2023-10-23 NOTE — TELEPHONE ENCOUNTER
PATIENT HAS CALLED REQUESTING A CALL BACK TO LET HIM KNOW IF HE IS TO SCHEDULE A FOLLOW UP APPOINTMENT TO CHECK HIS A1C. PATIENT IS ALSO WANTING TO KNOW IF A REFERRAL HAS BEEN PLACED FOR HIM TO GET AN EYE APPOINTMENT.     CALL BACK NUMBER [phone]

## 2023-10-23 NOTE — TELEPHONE ENCOUNTER
Patient should have already been in for follow up per  last note. Okay to work in? Do you want to order A1C?

## 2023-10-24 NOTE — TELEPHONE ENCOUNTER
Left VM advising patient of active labs and letter that was mailed of optometrists he could schedule with. Also advised to call the office to schedule appointment for 2-4 weeks.

## 2023-10-25 NOTE — TELEPHONE ENCOUNTER
Called patient no answer, and voicemail was full, sent patient a [employer] message and scheduled for 3:30PM tomorrow 10/26/2023, if patient calls back please see if that time and date will work for follow up and if not send message back so I can reschedule.  Hub can relay.

## 2023-10-27 LAB
ALBUMIN SERPL-MCNC: 4.7 G/DL (ref 3.5–5.2)
ALBUMIN/GLOB SERPL: 2.2 G/DL
ALP SERPL-CCNC: 74 U/L (ref 39–117)
ALT SERPL-CCNC: 36 U/L (ref 1–41)
AST SERPL-CCNC: 23 U/L (ref 1–40)
BILIRUB SERPL-MCNC: 0.5 MG/DL (ref 0–1.2)
BUN SERPL-MCNC: 17 MG/DL (ref 6–20)
BUN/CREAT SERPL: 18.9 (ref 7–25)
CALCIUM SERPL-MCNC: 9.7 MG/DL (ref 8.6–10.5)
CHLORIDE SERPL-SCNC: 98 MMOL/L (ref 98–107)
CO2 SERPL-SCNC: 29 MMOL/L (ref 22–29)
CREAT SERPL-MCNC: 0.9 MG/DL (ref 0.76–1.27)
EGFRCR SERPLBLD CKD-EPI 2021: 99 ML/MIN/1.73
GLOBULIN SER CALC-MCNC: 2.1 GM/DL
GLUCOSE SERPL-MCNC: 190 MG/DL (ref 65–99)
HBA1C MFR BLD: 9 % (ref 4.8–5.6)
POTASSIUM SERPL-SCNC: 4.6 MMOL/L (ref 3.5–5.2)
PROT SERPL-MCNC: 6.8 G/DL (ref 6–8.5)
SODIUM SERPL-SCNC: 136 MMOL/L (ref 136–145)

## 2023-11-02 ENCOUNTER — OFFICE VISIT (OUTPATIENT)
Dept: INTERNAL MEDICINE | Facility: CLINIC | Age: 58
End: 2023-11-02
Payer: COMMERCIAL

## 2023-11-02 VITALS
RESPIRATION RATE: 16 BRPM | SYSTOLIC BLOOD PRESSURE: 100 MMHG | OXYGEN SATURATION: 97 % | WEIGHT: 198 LBS | HEART RATE: 88 BPM | HEIGHT: 74 IN | BODY MASS INDEX: 25.41 KG/M2 | DIASTOLIC BLOOD PRESSURE: 64 MMHG | TEMPERATURE: 96.4 F

## 2023-11-02 DIAGNOSIS — I10 HYPERTENSION, UNSPECIFIED TYPE: ICD-10-CM

## 2023-11-02 DIAGNOSIS — E11.65 TYPE 2 DIABETES MELLITUS WITH HYPERGLYCEMIA, WITHOUT LONG-TERM CURRENT USE OF INSULIN: Primary | ICD-10-CM

## 2023-11-02 NOTE — PROGRESS NOTES
Subjective     Patient ID: Vaughn Huynh is a 58 y.o. male. Patient is here for management of multiple medical problems.     Chief Complaint   Patient presents with    Diabetes     History of Present Illness   DM  Some better.               The following portions of the patient's history were reviewed and updated as appropriate: allergies, current medications, past family history, past medical history, past social history, past surgical history and problem list.    Review of Systems    Current Outpatient Medications:     apixaban (ELIQUIS) 5 MG tablet tablet, Take 1 tablet by mouth Every 12 (Twelve) Hours. Indications: Atrial Fibrillation, Disp: 180 tablet, Rfl: 3    aspirin 81 MG tablet, Take  by mouth daily., Disp: , Rfl:     betamethasone dipropionate 0.05 % cream, Apply  topically to the appropriate area as directed See Admin Instructions. Apply topically to the affected area twice daily, Disp: 15 g, Rfl: 0    fenofibrate micronized (LOFIBRA) 200 MG capsule, Take 1 capsule by mouth Every Morning Before Breakfast., Disp: , Rfl:     glucose monitor monitoring kit, 1 each As Needed (bs)., Disp: 1 each, Rfl: 1    lisinopril-hydrochlorothiazide (PRINZIDE,ZESTORETIC) 20-12.5 MG per tablet, Take 1 tablet by mouth 2 (Two) Times a Day., Disp: 180 tablet, Rfl: 3    metFORMIN (GLUCOPHAGE) 500 MG tablet, TAKE 1 TABLET BY MOUTH TWICE DAILY WITH MEALS, Disp: 180 tablet, Rfl: 3    metoprolol succinate XL (TOPROL-XL) 25 MG 24 hr tablet, Take 1 tablet by mouth Daily., Disp: 90 tablet, Rfl: 3    ONE TOUCH LANCETS misc, E11.9 use what is covered by insurance., Disp: 100 each, Rfl: 12    OneTouch Ultra test strip, TEST DAILY AS DIRECTED, Disp: 200 each, Rfl: 1    rosuvastatin (CRESTOR) 20 MG tablet, Take 1 tablet by mouth Daily., Disp: , Rfl:     Rybelsus 14 MG tablet, TAKE 1 TABLET BY MOUTH DAILY (Patient taking differently: Take 0.5 tablets by mouth Daily.), Disp: 30 tablet, Rfl: 11    Objective      Blood pressure 100/64,  "pulse 88, temperature 96.4 °F (35.8 °C), resp. rate 16, height 188 cm (74.02\"), weight 89.8 kg (198 lb), SpO2 97%.    Physical Exam     General Appearance:    Alert, cooperative, no distress, appears stated age   Head:    Normocephalic, without obvious abnormality, atraumatic   Eyes:    PERRL, conjunctiva/corneas clear, EOM's intact   Ears:    Normal TM's and external ear canals, both ears   Nose:   Nares normal, septum midline, mucosa normal, no drainage   or sinus tenderness   Throat:   Lips, mucosa, and tongue normal; teeth and gums normal   Neck:   Supple, symmetrical, trachea midline, no adenopathy;        thyroid:  No enlargement/tenderness/nodules; no carotid    bruit or JVD   Back:     Symmetric, no curvature, ROM normal, no CVA tenderness   Lungs:     Clear to auscultation bilaterally, respirations unlabored   Chest wall:    No tenderness or deformity   Heart:    Regular rate and rhythm, S1 and S2 normal, no murmur,        rub or gallop   Abdomen:     Soft, non-tender, bowel sounds active all four quadrants,     no masses, no organomegaly   Extremities:   Extremities normal, atraumatic, no cyanosis or edema   Pulses:   2+ and symmetric all extremities   Skin:   Skin color, texture, turgor normal, no rashes or lesions   Lymph nodes:   Cervical, supraclavicular, and axillary nodes normal   Neurologic:   CNII-XII intact. Normal strength, sensation and reflexes       throughout      Results for orders placed or performed in visit on 10/23/23   Hemoglobin A1c    Specimen: Blood   Result Value Ref Range    Hemoglobin A1C 9.00 (H) 4.80 - 5.60 %   Comprehensive Metabolic Panel    Specimen: Blood   Result Value Ref Range    Glucose 190 (H) 65 - 99 mg/dL    BUN 17 6 - 20 mg/dL    Creatinine 0.90 0.76 - 1.27 mg/dL    EGFR Result 99.0 >60.0 mL/min/1.73    BUN/Creatinine Ratio 18.9 7.0 - 25.0    Sodium 136 136 - 145 mmol/L    Potassium 4.6 3.5 - 5.2 mmol/L    Chloride 98 98 - 107 mmol/L    Total CO2 29.0 22.0 - 29.0 " mmol/L    Calcium 9.7 8.6 - 10.5 mg/dL    Total Protein 6.8 6.0 - 8.5 g/dL    Albumin 4.7 3.5 - 5.2 g/dL    Globulin 2.1 gm/dL    A/G Ratio 2.2 g/dL    Total Bilirubin 0.5 0.0 - 1.2 mg/dL    Alkaline Phosphatase 74 39 - 117 U/L    AST (SGOT) 23 1 - 40 U/L    ALT (SGPT) 36 1 - 41 U/L         Assessment & Plan     Dm  Pt will increase excise.  Discussed cross fit.      Diagnoses and all orders for this visit:    1. Type 2 diabetes mellitus with hyperglycemia, without long-term current use of insulin (Primary)    2. Hypertension, unspecified type    Other orders  -     Fluzone >6 Months ([id number])      Return in about 6 months (around 5/2/2024).          There are no Patient Instructions on file for this visit.     Sen Nichole MD    Assessment & Plan

## 2023-11-24 DIAGNOSIS — E11.9 TYPE 2 DIABETES MELLITUS WITHOUT COMPLICATION, WITHOUT LONG-TERM CURRENT USE OF INSULIN: ICD-10-CM

## 2023-11-27 RX ORDER — ORAL SEMAGLUTIDE 14 MG/1
1 TABLET ORAL DAILY
Qty: 30 TABLET | Refills: 11 | Status: SHIPPED | OUTPATIENT
Start: 2023-11-27

## 2023-12-04 ENCOUNTER — OFFICE VISIT (OUTPATIENT)
Dept: INTERNAL MEDICINE | Facility: CLINIC | Age: 58
End: 2023-12-04
Payer: COMMERCIAL

## 2023-12-04 VITALS
OXYGEN SATURATION: 98 % | TEMPERATURE: 97.8 F | WEIGHT: 194 LBS | DIASTOLIC BLOOD PRESSURE: 76 MMHG | BODY MASS INDEX: 24.9 KG/M2 | SYSTOLIC BLOOD PRESSURE: 120 MMHG | HEART RATE: 100 BPM | RESPIRATION RATE: 16 BRPM | HEIGHT: 74 IN

## 2023-12-04 DIAGNOSIS — R31.9 HEMATURIA, UNSPECIFIED TYPE: ICD-10-CM

## 2023-12-04 DIAGNOSIS — E11.9 TYPE 2 DIABETES MELLITUS WITHOUT COMPLICATION, WITHOUT LONG-TERM CURRENT USE OF INSULIN: Primary | ICD-10-CM

## 2023-12-04 DIAGNOSIS — E78.00 PURE HYPERCHOLESTEROLEMIA: ICD-10-CM

## 2023-12-04 LAB
EXPIRATION DATE: ABNORMAL
HBA1C MFR BLD: 8.6 % (ref 4.5–5.7)
Lab: ABNORMAL

## 2023-12-04 RX ORDER — ACYCLOVIR 400 MG/1
1 TABLET ORAL
Qty: 1 EACH | Refills: 0 | COMMUNITY
Start: 2023-12-04

## 2023-12-04 RX ORDER — ORAL SEMAGLUTIDE 3 MG/1
3 TABLET ORAL DAILY
Qty: 120 TABLET | Refills: 0 | COMMUNITY
Start: 2023-12-04

## 2023-12-04 NOTE — PROGRESS NOTES
Subjective     Patient ID: Vaughn Huynh is a 58 y.o. male. Patient is here for management of multiple medical problems.     Chief Complaint   Patient presents with    Diabetes     Diabetes  Pertinent negatives for diabetes include no fatigue.      DM    Diet changes going better.    Starting to exercise better.          The following portions of the patient's history were reviewed and updated as appropriate: allergies, current medications, past family history, past medical history, past social history, past surgical history and problem list.    Review of Systems   Constitutional:  Negative for fatigue.       Current Outpatient Medications:     apixaban (ELIQUIS) 5 MG tablet tablet, Take 1 tablet by mouth Every 12 (Twelve) Hours. Indications: Atrial Fibrillation, Disp: 180 tablet, Rfl: 3    aspirin 81 MG tablet, Take  by mouth daily., Disp: , Rfl:     betamethasone dipropionate 0.05 % cream, Apply  topically to the appropriate area as directed See Admin Instructions. Apply topically to the affected area twice daily, Disp: 15 g, Rfl: 0    fenofibrate micronized (LOFIBRA) 200 MG capsule, Take 1 capsule by mouth Every Morning Before Breakfast., Disp: , Rfl:     glucose monitor monitoring kit, 1 each As Needed (bs)., Disp: 1 each, Rfl: 1    lisinopril-hydrochlorothiazide (PRINZIDE,ZESTORETIC) 20-12.5 MG per tablet, Take 1 tablet by mouth 2 (Two) Times a Day., Disp: 180 tablet, Rfl: 3    metFORMIN (GLUCOPHAGE) 500 MG tablet, TAKE 1 TABLET BY MOUTH TWICE DAILY WITH MEALS, Disp: 180 tablet, Rfl: 3    metoprolol succinate XL (TOPROL-XL) 25 MG 24 hr tablet, Take 1 tablet by mouth Daily., Disp: 90 tablet, Rfl: 3    ONE TOUCH LANCETS misc, E11.9 use what is covered by insurance., Disp: 100 each, Rfl: 12    OneTouch Ultra test strip, TEST DAILY AS DIRECTED, Disp: 200 each, Rfl: 1    rosuvastatin (CRESTOR) 20 MG tablet, Take 1 tablet by mouth Daily., Disp: , Rfl:     Rybelsus 14 MG tablet, TAKE 1 TABLET BY MOUTH DAILY, Disp:  "30 tablet, Rfl: 11    Continuous Blood Gluc Sensor (Dexcom G7 Sensor) misc, Use 1 each Every 10 (Ten) Days., Disp: 1 each, Rfl: 0    Objective      Blood pressure 120/76, pulse 100, temperature 97.8 °F (36.6 °C), resp. rate 16, height 188 cm (74.02\"), weight 88 kg (194 lb), SpO2 98%.            Physical Exam     General Appearance:    Alert, cooperative, no distress, appears stated age   Head:    Normocephalic, without obvious abnormality, atraumatic   Eyes:    PERRL, conjunctiva/corneas clear, EOM's intact   Ears:    Normal TM's and external ear canals, both ears   Nose:   Nares normal, septum midline, mucosa normal, no drainage   or sinus tenderness   Throat:   Lips, mucosa, and tongue normal; teeth and gums normal   Neck:   Supple, symmetrical, trachea midline, no adenopathy;        thyroid:  No enlargement/tenderness/nodules; no carotid    bruit or JVD   Back:     Symmetric, no curvature, ROM normal, no CVA tenderness   Lungs:     Clear to auscultation bilaterally, respirations unlabored   Chest wall:    No tenderness or deformity   Heart:    Regular rate and rhythm, S1 and S2 normal, no murmur,        rub or gallop   Abdomen:     Soft, non-tender, bowel sounds active all four quadrants,     no masses, no organomegaly   Extremities:   Extremities normal, atraumatic, no cyanosis or edema   Pulses:   2+ and symmetric all extremities   Skin:   Skin color, texture, turgor normal, no rashes or lesions   Lymph nodes:   Cervical, supraclavicular, and axillary nodes normal   Neurologic:   CNII-XII intact. Normal strength, sensation and reflexes       throughout      Results for orders placed or performed in visit on 10/23/23   Hemoglobin A1c    Specimen: Blood   Result Value Ref Range    Hemoglobin A1C 9.00 (H) 4.80 - 5.60 %   Comprehensive Metabolic Panel    Specimen: Blood   Result Value Ref Range    Glucose 190 (H) 65 - 99 mg/dL    BUN 17 6 - 20 mg/dL    Creatinine 0.90 0.76 - 1.27 mg/dL    EGFR Result 99.0 >60.0 " mL/min/1.73    BUN/Creatinine Ratio 18.9 7.0 - 25.0    Sodium 136 136 - 145 mmol/L    Potassium 4.6 3.5 - 5.2 mmol/L    Chloride 98 98 - 107 mmol/L    Total CO2 29.0 22.0 - 29.0 mmol/L    Calcium 9.7 8.6 - 10.5 mg/dL    Total Protein 6.8 6.0 - 8.5 g/dL    Albumin 4.7 3.5 - 5.2 g/dL    Globulin 2.1 gm/dL    A/G Ratio 2.2 g/dL    Total Bilirubin 0.5 0.0 - 1.2 mg/dL    Alkaline Phosphatase 74 39 - 117 U/L    AST (SGOT) 23 1 - 40 U/L    ALT (SGPT) 36 1 - 41 U/L         Assessment & Plan       Pt was out of rybelsus 7  x 1 week due to shortages with [facility].     Diet changes dicussed in detail.     Now exercising.       Hx of hematuria since age 8. Not seen urology in a long time.     Diagnoses and all orders for this visit:    1. Type 2 diabetes mellitus without complication, without long-term current use of insulin (Primary)  -     Continuous Blood Gluc Sensor (Dexcom G7 Sensor) misc; Use 1 each Every 10 (Ten) Days.  Dispense: 1 each; Refill: 0  -     POCT glycated hemoglobin, total  -     Lipid Panel  -     CBC & Differential  -     Vitamin B12  -     Comprehensive Metabolic Panel  -     T4, Free  -     TSH  -     Vitamin D,25-Hydroxy  -     Hemoglobin A1c  -     MicroAlbumin, Urine, Random - Urine, Clean Catch    2. Pure hypercholesterolemia  -     Continuous Blood Gluc Sensor (Dexcom G7 Sensor) misc; Use 1 each Every 10 (Ten) Days.  Dispense: 1 each; Refill: 0  -     Lipid Panel  -     CBC & Differential  -     Vitamin B12  -     Comprehensive Metabolic Panel  -     T4, Free  -     TSH  -     Vitamin D,25-Hydroxy  -     Hemoglobin A1c  -     MicroAlbumin, Urine, Random - Urine, Clean Catch    3. Hematuria, unspecified type  -     Urinalysis With Microscopic - Urine, Clean Catch      Return in about 6 months (around 6/4/2024).          There are no Patient Instructions on file for this visit.     Sen Nichole MD    Assessment & Plan

## 2023-12-23 DIAGNOSIS — E11.9 TYPE 2 DIABETES MELLITUS WITHOUT COMPLICATION, WITHOUT LONG-TERM CURRENT USE OF INSULIN: ICD-10-CM

## 2023-12-23 DIAGNOSIS — E78.00 PURE HYPERCHOLESTEROLEMIA: ICD-10-CM

## 2023-12-26 RX ORDER — ACYCLOVIR 400 MG/1
1 TABLET ORAL
Qty: 1 EACH | Refills: 0 | COMMUNITY
Start: 2023-12-26

## 2023-12-28 DIAGNOSIS — E11.9 TYPE 2 DIABETES MELLITUS WITHOUT COMPLICATION, WITHOUT LONG-TERM CURRENT USE OF INSULIN: ICD-10-CM

## 2023-12-28 DIAGNOSIS — E78.00 PURE HYPERCHOLESTEROLEMIA: ICD-10-CM

## 2023-12-28 NOTE — TELEPHONE ENCOUNTER
Caller: Vaughn Huynh    Relationship: Self    Best call back number: [phone]     Requested Prescriptions:   Requested Prescriptions     Pending Prescriptions Disp Refills    Continuous Blood Gluc Sensor (Dexcom G7 Sensor) misc 1 each 0     Sig: Use 1 each Every 10 (Ten) Days.        Pharmacy where request should be sent: [facility] #[id number] [address] AT [facility] & [place] - [phone] [facility] [phone] FX     Last office visit with prescribing clinician: 12/4/2023   Last telemedicine visit with prescribing clinician: Visit date not found   Next office visit with prescribing clinician: 6/4/2024     Additional details provided by patient: out of the sample the patient has nothing    Does the patient have less than a 3 day supply:  [x] Yes  [] No    Would you like a call back once the refill request has been completed: [x] Yes [] No    If the office needs to give you a call back, can they leave a voicemail: [x] Yes [] No    Abby Mendiola Rep   12/28/23 15:58 EST

## 2024-01-02 ENCOUNTER — PATIENT MESSAGE (OUTPATIENT)
Dept: INTERNAL MEDICINE | Facility: CLINIC | Age: 59
End: 2024-01-02
Payer: COMMERCIAL

## 2024-01-02 RX ORDER — ACYCLOVIR 400 MG/1
1 TABLET ORAL
Qty: 9 EACH | Refills: 1 | COMMUNITY
Start: 2024-01-02 | End: 2024-01-05 | Stop reason: SDUPTHER

## 2024-01-02 NOTE — TELEPHONE ENCOUNTER
From: Vaughn Huynh  To: Sen Nichole  Sent: 1/2/2024 12:38 PM EST  Subject: Ok. U will call and let me know when it is ready     You will notify me when pharmacy has the prescription? Thanks Emy.

## 2024-01-05 RX ORDER — ACYCLOVIR 400 MG/1
1 TABLET ORAL
Qty: 9 EACH | Refills: 1 | Status: SHIPPED | OUTPATIENT
Start: 2024-01-05

## 2024-03-05 ENCOUNTER — OFFICE VISIT (OUTPATIENT)
Dept: NEUROLOGY | Facility: CLINIC | Age: 59
End: 2024-03-05
Payer: COMMERCIAL

## 2024-03-05 VITALS
OXYGEN SATURATION: 97 % | HEIGHT: 74 IN | TEMPERATURE: 97.1 F | WEIGHT: 204 LBS | SYSTOLIC BLOOD PRESSURE: 120 MMHG | HEART RATE: 97 BPM | BODY MASS INDEX: 26.18 KG/M2 | DIASTOLIC BLOOD PRESSURE: 82 MMHG

## 2024-03-05 DIAGNOSIS — G47.33 OBSTRUCTIVE SLEEP APNEA: Primary | ICD-10-CM

## 2024-03-05 PROCEDURE — 99213 OFFICE O/P EST LOW 20 MIN: CPT | Performed by: NURSE PRACTITIONER

## 2024-03-05 NOTE — PROGRESS NOTES
"     Follow up Sleep Patient Office Visit      Patient Name: Vaughn Huynh  : 1965   MRN: [id number]     Referring Physician: No ref. provider found    Chief Complaint:    Chief Complaint   Patient presents with    Follow-up     Patient in office to follow up on antony.          History of Present Illness: Vaughn Huynh is a 58 y.o. male who is here today to follow up with ANTONY.  Currently on AutoPap 6-16cm, 95th percentile 8.5cm, AHI 0.3/hour, compliance 73%.  He is tolerating his pressures well.  He is using a nasal mask and [employer] [facility].  His compliance is down because his headgear broke and he had to get a new one and states, \"I got stuck out of town without it a couple of times\".  He has had symptomatic improvement with initiation of AutoPap therapy.  He asks about the Inspire device for ANTONY.     Pertinent Medical History:   Current compliance report reviewed and has been scanned into the patient's medical record.    Subjective      Review of Systems:   Review of Systems    Medications:     Current Outpatient Medications:     apixaban (ELIQUIS) 5 MG tablet tablet, Take 1 tablet by mouth Every 12 (Twelve) Hours. Indications: Atrial Fibrillation, Disp: 180 tablet, Rfl: 3    aspirin 81 MG tablet, Take  by mouth daily., Disp: , Rfl:     betamethasone dipropionate 0.05 % cream, Apply  topically to the appropriate area as directed See Admin Instructions. Apply topically to the affected area twice daily, Disp: 15 g, Rfl: 0    Continuous Blood Gluc Sensor (Dexcom G7 Sensor) misc, Use 1 each Every 10 (Ten) Days., Disp: 9 each, Rfl: 1    fenofibrate micronized (LOFIBRA) 200 MG capsule, Take 1 capsule by mouth Every Morning Before Breakfast., Disp: , Rfl:     glucose monitor monitoring kit, 1 each As Needed (bs)., Disp: 1 each, Rfl: 1    lisinopril-hydrochlorothiazide (PRINZIDE,ZESTORETIC) 20-12.5 MG per tablet, Take 1 tablet by mouth 2 (Two) Times a Day., Disp: 180 tablet, Rfl: 3    metFORMIN (GLUCOPHAGE) 500 MG " "tablet, TAKE 1 TABLET BY MOUTH TWICE DAILY WITH MEALS, Disp: 180 tablet, Rfl: 3    metoprolol succinate XL (TOPROL-XL) 25 MG 24 hr tablet, Take 1 tablet by mouth Daily., Disp: 90 tablet, Rfl: 3    ONE TOUCH LANCETS misc, E11.9 use what is covered by insurance., Disp: 100 each, Rfl: 12    OneTouch Ultra test strip, TEST DAILY AS DIRECTED, Disp: 200 each, Rfl: 1    rosuvastatin (CRESTOR) 20 MG tablet, Take 1 tablet by mouth Daily., Disp: , Rfl:     Rybelsus 14 MG tablet, TAKE 1 TABLET BY MOUTH DAILY, Disp: 30 tablet, Rfl: 11    Semaglutide (Rybelsus) 3 MG tablet, Take 1 tablet by mouth Daily., Disp: 120 tablet, Rfl: 0    Allergies:   Allergies   Allergen Reactions    Statins Unknown - Low Severity     Other reaction(s): Rhabdomyolysis       Objective     Physical Exam:  Vital Signs:   Vitals:    03/05/24 1505   BP: 120/82   BP Location: Right arm   Patient Position: Sitting   Cuff Size: Adult   Pulse: 97   Temp: 97.1 °F (36.2 °C)   SpO2: 97%   Weight: 92.5 kg (204 lb)   Height: 188 cm (74.02\")   PainSc: 0-No pain     BMI: Body mass index is 26.18 kg/m².    Physical Exam  Vitals and nursing note reviewed.   Constitutional:       General: He is not in acute distress.     Appearance: Normal appearance. He is well-developed. He is not diaphoretic.   HENT:      Head: Normocephalic and atraumatic.   Eyes:      Extraocular Movements: Extraocular movements intact.      Conjunctiva/sclera: Conjunctivae normal.   Pulmonary:      Effort: Pulmonary effort is normal. No respiratory distress.   Musculoskeletal:         General: Normal range of motion.   Skin:     General: Skin is warm and dry.   Neurological:      Mental Status: He is alert and oriented to person, place, and time.   Psychiatric:         Mood and Affect: Mood normal.         Behavior: Behavior normal.         Thought Content: Thought content normal.         Judgment: Judgment normal.         Assessment / Plan      Assessment/Plan:   Diagnoses and all orders for this " visit:    1. Obstructive sleep apnea (Primary)    2. BMI 26.0-26.9,adult    *Order for supplies sent to [facility].   *Advised patient to avoid driving if drowsy.    *I have provided patient with an Inspire information folder today.     Follow Up:   Return in about 1 year (around 3/5/2025) for F/U Obstructive Sleep Apnea.    I have advised the patient the need to continue the use of CPAP.  Gold standard for treatment of sleep apnea includes weight loss, use of cpap and avoidance of alcohol.  Untreated SALVADOR may increase the risk for development of hypertension, stroke, myocardial infarction, diabetes, cardiovascular disease, work-related issues and driving accidents. I have counseled and advised the patient to avoid driving or operating heavy/dangerous equipment if feeling drowsy.     MYRNA Machado, FNP-C  [facility]

## 2024-03-09 RX ORDER — BETAMETHASONE DIPROPIONATE 0.5 MG/G
CREAM TOPICAL 2 TIMES DAILY
Qty: 15 G | Refills: 0 | Status: SHIPPED | OUTPATIENT
Start: 2024-03-09

## 2024-03-15 ENCOUNTER — PRIOR AUTHORIZATION (OUTPATIENT)
Dept: INTERNAL MEDICINE | Facility: CLINIC | Age: 59
End: 2024-03-15
Payer: COMMERCIAL

## 2024-03-15 NOTE — TELEPHONE ENCOUNTER
Vaughn Huynh (Key: [id number])  Rx #: [id number]  Rybelsus 14MG tablets     Form  [employer] Electronic PA Form (2017 N[employer])  Created  6 days ago  Sent to Plan  Plan Response  Submit Clinical Questions  Determination  Wait for Determination  Please wait for [employer] 2017 to return a determination.

## 2024-03-18 NOTE — TELEPHONE ENCOUNTER
We denied your request because we did not see what we need to approve the drug you   asked for, (Rybelsus). We may be able to approve this drug when you have tried other   drugs first (two preferred glucagon-like peptide-1 [GLP-1] receptor agonists [Ozempic   (semaglutide), Trulicity (dulaglutide), Victoza (liraglutide)]). We do not see that you have   tried these other drugs first, they did not work for you, or they caused you harm. If you   have tried these other drugs, we may need more information (if you have a certain type   of other illness; or if you will use this drug with certain other drugs.

## 2024-03-19 NOTE — TELEPHONE ENCOUNTER
Relayed to patient. He said he splits them in half anyway. He said he will just keep Macarena appt. at this point.

## 2024-04-11 NOTE — TELEPHONE ENCOUNTER
Caller: Vaughn Huynh    Relationship: Self    Best call back number: [phone]    Requested Prescriptions:   Requested Prescriptions     Pending Prescriptions Disp Refills    apixaban (ELIQUIS) 5 MG tablet tablet 180 tablet 3     Sig: Take 1 tablet by mouth Every 12 (Twelve) Hours. Indications: Atrial Fibrillation        Pharmacy where request should be sent: [id number] [address] AT [facility] & [facility] [phone] [facility] [phone] FX     Last office visit with prescribing clinician: 12/4/2023   Last telemedicine visit with prescribing clinician: Visit date not found   Next office visit with prescribing clinician: 6/4/2024     Additional details provided by patient: PATIENT HAS 5 PILLS LEFT    Does the patient have less than a 3 day supply:  [] Yes  [x] No    Would you like a call back once the refill request has been completed: [x] Yes [] No    If the office needs to give you a call back, can they leave a voicemail: [x] Yes [] No    Abby Uriarte Rep   04/11/24 10:38 EDT

## 2024-04-11 NOTE — TELEPHONE ENCOUNTER
Rx Refill Note  Requested Prescriptions     Pending Prescriptions Disp Refills    apixaban (ELIQUIS) 5 MG tablet tablet 180 tablet 3     Sig: Take 1 tablet by mouth Every 12 (Twelve) Hours. Indications: Atrial Fibrillation      Last office visit with prescribing clinician: 12/4/2023   Last telemedicine visit with prescribing clinician: Visit date not found   Next office visit with prescribing clinician: 6/4/2024       Yaneth Almaguer MA  04/11/24, 10:50 EDT

## 2024-05-07 ENCOUNTER — TELEPHONE (OUTPATIENT)
Dept: INTERNAL MEDICINE | Facility: CLINIC | Age: 59
End: 2024-05-07
Payer: COMMERCIAL

## 2024-05-14 ENCOUNTER — OFFICE VISIT (OUTPATIENT)
Dept: INTERNAL MEDICINE | Facility: CLINIC | Age: 59
End: 2024-05-14
Payer: COMMERCIAL

## 2024-05-14 VITALS
BODY MASS INDEX: 26.18 KG/M2 | HEIGHT: 74 IN | SYSTOLIC BLOOD PRESSURE: 110 MMHG | TEMPERATURE: 97.9 F | DIASTOLIC BLOOD PRESSURE: 82 MMHG | WEIGHT: 204 LBS | RESPIRATION RATE: 16 BRPM | HEART RATE: 95 BPM | OXYGEN SATURATION: 95 %

## 2024-05-14 DIAGNOSIS — E11.65 TYPE 2 DIABETES MELLITUS WITH HYPERGLYCEMIA, WITHOUT LONG-TERM CURRENT USE OF INSULIN: ICD-10-CM

## 2024-05-14 DIAGNOSIS — J32.0 MAXILLARY SINUSITIS, UNSPECIFIED CHRONICITY: ICD-10-CM

## 2024-05-14 DIAGNOSIS — I49.9 CARDIAC ARRHYTHMIA, UNSPECIFIED CARDIAC ARRHYTHMIA TYPE: ICD-10-CM

## 2024-05-14 DIAGNOSIS — M25.561 ACUTE PAIN OF RIGHT KNEE: ICD-10-CM

## 2024-05-14 DIAGNOSIS — Z12.5 PROSTATE CANCER SCREENING: ICD-10-CM

## 2024-05-14 DIAGNOSIS — J00 ACUTE RHINITIS: Primary | ICD-10-CM

## 2024-05-14 PROCEDURE — 93000 ELECTROCARDIOGRAM COMPLETE: CPT | Performed by: INTERNAL MEDICINE

## 2024-05-14 PROCEDURE — 99214 OFFICE O/P EST MOD 30 MIN: CPT | Performed by: INTERNAL MEDICINE

## 2024-05-14 RX ORDER — AMOXICILLIN AND CLAVULANATE POTASSIUM 875; 125 MG/1; MG/1
1 TABLET, FILM COATED ORAL EVERY 12 HOURS SCHEDULED
Qty: 20 TABLET | Refills: 0 | Status: SHIPPED | OUTPATIENT
Start: 2024-05-14

## 2024-05-14 RX ORDER — LEVOCETIRIZINE DIHYDROCHLORIDE 5 MG/1
5 TABLET, FILM COATED ORAL EVERY EVENING
Qty: 90 TABLET | Refills: 0 | Status: SHIPPED | OUTPATIENT
Start: 2024-05-14

## 2024-05-14 NOTE — PROGRESS NOTES
Subjective     Patient ID: Vaughn Huynh is a 58 y.o. male. Patient is here for management of multiple medical problems.     Chief Complaint   Patient presents with    Knee Pain     Left knee pain started last week     Diabetes     History of Present Illness   Rhinitis. Took night quil last night.       Sinusitis pressure and pain.            Woke with Sunday morning with knee swelling   The following portions of the patient's history were reviewed and updated as appropriate: allergies, current medications, past family history, past medical history, past social history, past surgical history and problem list.    Review of Systems    Current Outpatient Medications:     apixaban (ELIQUIS) 5 MG tablet tablet, Take 1 tablet by mouth Every 12 (Twelve) Hours. Indications: Atrial Fibrillation, Disp: 180 tablet, Rfl: 3    aspirin 81 MG tablet, Take  by mouth daily., Disp: , Rfl:     betamethasone dipropionate 0.05 % cream, APPLY TOPICALLY TO THE AFFECTED AREA TWICE DAILY AS DIRECTED, Disp: 15 g, Rfl: 0    Continuous Blood Gluc Sensor (Dexcom G7 Sensor) misc, Use 1 each Every 10 (Ten) Days., Disp: 9 each, Rfl: 1    fenofibrate micronized (LOFIBRA) 200 MG capsule, Take 1 capsule by mouth Every Morning Before Breakfast., Disp: , Rfl:     glucose monitor monitoring kit, 1 each As Needed (bs)., Disp: 1 each, Rfl: 1    lisinopril-hydrochlorothiazide (PRINZIDE,ZESTORETIC) 20-12.5 MG per tablet, Take 1 tablet by mouth 2 (Two) Times a Day., Disp: 180 tablet, Rfl: 3    metFORMIN (GLUCOPHAGE) 500 MG tablet, TAKE 1 TABLET BY MOUTH TWICE DAILY WITH MEALS, Disp: 180 tablet, Rfl: 3    metoprolol succinate XL (TOPROL-XL) 25 MG 24 hr tablet, Take 1 tablet by mouth Daily., Disp: 90 tablet, Rfl: 3    ONE TOUCH LANCETS misc, E11.9 use what is covered by insurance., Disp: 100 each, Rfl: 12    OneTouch Ultra test strip, TEST DAILY AS DIRECTED, Disp: 200 each, Rfl: 1    rosuvastatin (CRESTOR) 20 MG tablet, Take 1 tablet by mouth Daily., Disp: ,  "Rfl:     amoxicillin-clavulanate (AUGMENTIN) 875-125 MG per tablet, Take 1 tablet by mouth Every 12 (Twelve) Hours., Disp: 20 tablet, Rfl: 0    levocetirizine (XYZAL) 5 MG tablet, Take 1 tablet by mouth Every Evening., Disp: 90 tablet, Rfl: 0    Objective      Blood pressure 110/82, pulse 95, temperature 97.9 °F (36.6 °C), resp. rate 16, height 188 cm (74.02\"), weight 92.5 kg (204 lb), SpO2 95%.            Physical Exam     General Appearance:    Alert, cooperative, no distress, appears stated age   Head:    Normocephalic, without obvious abnormality, atraumatic   Eyes:    PERRL, conjunctiva/corneas clear, EOM's intact   Ears:    Normal TM's and external ear canals, both ears   Nose:   Nares normal, septum midline, mucosa normal, no drainage   or sinus tenderness   Throat:   Lips, mucosa, and tongue normal; teeth and gums normal   Neck:   Supple, symmetrical, trachea midline, no adenopathy;        thyroid:  No enlargement/tenderness/nodules; no carotid    bruit or JVD   Back:     Symmetric, no curvature, ROM normal, no CVA tenderness   Lungs:     Clear to auscultation bilaterally, respirations unlabored   Chest wall:    No tenderness or deformity   Heart:  Fast  Regular rate and irregular rhythm, S1 and S2 normal, no murmur,        rub or gallop   Abdomen:     Soft, non-tender, bowel sounds active all four quadrants,     no masses, no organomegaly   Extremities:   Extremities normal, atraumatic, no cyanosis or edema   Pulses:   2+ and symmetric all extremities   Skin:   Skin color, texture, turgor normal, no rashes or lesions   Lymph nodes:   Cervical, supraclavicular, and axillary nodes normal   Neurologic:   CNII-XII intact. Normal strength, sensation and reflexes       throughout      Results for orders placed or performed in visit on 12/04/23   POCT glycated hemoglobin, total    Specimen: Urine   Result Value Ref Range    Hemoglobin A1C 8.6 (A) 4.5 - 5.7 %    Lot Number [id number]     Expiration Date 06/20/2025  "         Assessment & Plan       ECG 12 Lead    Date/Time: 5/14/2024 10:12 AM  Performed by: Sen Nichole MD    Authorized by: Sen Nichole MD  Comparison: compared with previous ECG from 10/10/2019  Similar to previous ECG  Rhythm: sinus rhythm  Ectopy: unifocal PVCs  Rate: normal  QRS axis: left    Clinical impression: abnormal EKG  Comments: No change               Diagnoses and all orders for this visit:    1. Acute rhinitis (Primary)  -     amoxicillin-clavulanate (AUGMENTIN) 875-125 MG per tablet; Take 1 tablet by mouth Every 12 (Twelve) Hours.  Dispense: 20 tablet; Refill: 0  -     levocetirizine (XYZAL) 5 MG tablet; Take 1 tablet by mouth Every Evening.  Dispense: 90 tablet; Refill: 0  -     Comprehensive Metabolic Panel  -     Vitamin B12  -     CBC & Differential  -     PSA Screen  -     Lipid Panel  -     TSH  -     T4, Free  -     Hemoglobin A1c    2. Maxillary sinusitis, unspecified chronicity  -     amoxicillin-clavulanate (AUGMENTIN) 875-125 MG per tablet; Take 1 tablet by mouth Every 12 (Twelve) Hours.  Dispense: 20 tablet; Refill: 0  -     levocetirizine (XYZAL) 5 MG tablet; Take 1 tablet by mouth Every Evening.  Dispense: 90 tablet; Refill: 0  -     Comprehensive Metabolic Panel  -     Vitamin B12  -     CBC & Differential  -     PSA Screen  -     Lipid Panel  -     TSH  -     T4, Free  -     Uric acid  -     Hemoglobin A1c    3. Type 2 diabetes mellitus with hyperglycemia, without long-term current use of insulin  -     amoxicillin-clavulanate (AUGMENTIN) 875-125 MG per tablet; Take 1 tablet by mouth Every 12 (Twelve) Hours.  Dispense: 20 tablet; Refill: 0  -     levocetirizine (XYZAL) 5 MG tablet; Take 1 tablet by mouth Every Evening.  Dispense: 90 tablet; Refill: 0  -     Comprehensive Metabolic Panel  -     Vitamin B12  -     CBC & Differential  -     PSA Screen  -     Lipid Panel  -     TSH  -     T4, Free  -     Hemoglobin A1c  -     Uric acid  -     Hemoglobin A1c  -      MicroAlbumin, Urine, Random - Urine, Clean Catch    4. Prostate cancer screening  -     amoxicillin-clavulanate (AUGMENTIN) 875-125 MG per tablet; Take 1 tablet by mouth Every 12 (Twelve) Hours.  Dispense: 20 tablet; Refill: 0  -     levocetirizine (XYZAL) 5 MG tablet; Take 1 tablet by mouth Every Evening.  Dispense: 90 tablet; Refill: 0  -     Comprehensive Metabolic Panel  -     Vitamin B12  -     CBC & Differential  -     PSA Screen  -     Lipid Panel  -     TSH  -     T4, Free    5. Acute pain of right knee  -     XR Knee 1 or 2 View Left; Future  -     Uric acid  -     Hemoglobin A1c  -     MicroAlbumin, Urine, Random - Urine, Clean Catch    Other orders  -     ECG 12 Lead      No follow-ups on file.          There are no Patient Instructions on file for this visit.     Sen Nichole MD    Assessment & Plan

## 2024-06-04 ENCOUNTER — OFFICE VISIT (OUTPATIENT)
Dept: INTERNAL MEDICINE | Facility: CLINIC | Age: 59
End: 2024-06-04
Payer: COMMERCIAL

## 2024-06-04 VITALS
SYSTOLIC BLOOD PRESSURE: 110 MMHG | HEIGHT: 74 IN | RESPIRATION RATE: 16 BRPM | BODY MASS INDEX: 25.28 KG/M2 | DIASTOLIC BLOOD PRESSURE: 78 MMHG | OXYGEN SATURATION: 98 % | HEART RATE: 82 BPM | WEIGHT: 197 LBS | TEMPERATURE: 97.6 F

## 2024-06-04 DIAGNOSIS — E78.00 PURE HYPERCHOLESTEROLEMIA: ICD-10-CM

## 2024-06-04 DIAGNOSIS — E11.9 TYPE 2 DIABETES MELLITUS WITHOUT COMPLICATION, WITHOUT LONG-TERM CURRENT USE OF INSULIN: ICD-10-CM

## 2024-06-04 LAB
ALBUMIN SERPL-MCNC: 4.4 G/DL (ref 3.5–5.2)
ALBUMIN/GLOB SERPL: 1.9 G/DL
ALP SERPL-CCNC: 74 U/L (ref 39–117)
ALT SERPL-CCNC: 25 U/L (ref 1–41)
AST SERPL-CCNC: 20 U/L (ref 1–40)
BASOPHILS # BLD AUTO: 0.04 10*3/MM3 (ref 0–0.2)
BASOPHILS NFR BLD AUTO: 0.8 % (ref 0–1.5)
BILIRUB SERPL-MCNC: 0.5 MG/DL (ref 0–1.2)
BUN SERPL-MCNC: 14 MG/DL (ref 6–20)
BUN/CREAT SERPL: 16.7 (ref 7–25)
CALCIUM SERPL-MCNC: 9.2 MG/DL (ref 8.6–10.5)
CHLORIDE SERPL-SCNC: 99 MMOL/L (ref 98–107)
CHOLEST SERPL-MCNC: 179 MG/DL (ref 0–200)
CO2 SERPL-SCNC: 24.8 MMOL/L (ref 22–29)
CREAT SERPL-MCNC: 0.84 MG/DL (ref 0.76–1.27)
EGFRCR SERPLBLD CKD-EPI 2021: 101.1 ML/MIN/1.73
EOSINOPHIL # BLD AUTO: 0.14 10*3/MM3 (ref 0–0.4)
EOSINOPHIL NFR BLD AUTO: 2.9 % (ref 0.3–6.2)
ERYTHROCYTE [DISTWIDTH] IN BLOOD BY AUTOMATED COUNT: 12.8 % (ref 12.3–15.4)
GLOBULIN SER CALC-MCNC: 2.3 GM/DL
GLUCOSE SERPL-MCNC: 250 MG/DL (ref 65–99)
HBA1C MFR BLD: 10.3 % (ref 4.8–5.6)
HCT VFR BLD AUTO: 45.3 % (ref 37.5–51)
HDLC SERPL-MCNC: 30 MG/DL (ref 40–60)
HGB BLD-MCNC: 14.8 G/DL (ref 13–17.7)
IMM GRANULOCYTES # BLD AUTO: 0.02 10*3/MM3 (ref 0–0.05)
IMM GRANULOCYTES NFR BLD AUTO: 0.4 % (ref 0–0.5)
LDLC SERPL CALC-MCNC: 70 MG/DL (ref 0–100)
LYMPHOCYTES # BLD AUTO: 1.36 10*3/MM3 (ref 0.7–3.1)
LYMPHOCYTES NFR BLD AUTO: 28 % (ref 19.6–45.3)
MCH RBC QN AUTO: 28 PG (ref 26.6–33)
MCHC RBC AUTO-ENTMCNC: 32.7 G/DL (ref 31.5–35.7)
MCV RBC AUTO: 85.8 FL (ref 79–97)
MICROALBUMIN UR-MCNC: 41.9 UG/ML
MONOCYTES # BLD AUTO: 0.35 10*3/MM3 (ref 0.1–0.9)
MONOCYTES NFR BLD AUTO: 7.2 % (ref 5–12)
NEUTROPHILS # BLD AUTO: 2.95 10*3/MM3 (ref 1.7–7)
NEUTROPHILS NFR BLD AUTO: 60.7 % (ref 42.7–76)
NRBC BLD AUTO-RTO: 0 /100 WBC (ref 0–0.2)
PLATELET # BLD AUTO: 266 10*3/MM3 (ref 140–450)
POTASSIUM SERPL-SCNC: 4.4 MMOL/L (ref 3.5–5.2)
PROT SERPL-MCNC: 6.7 G/DL (ref 6–8.5)
PSA SERPL-MCNC: 1.14 NG/ML (ref 0–4)
RBC # BLD AUTO: 5.28 10*6/MM3 (ref 4.14–5.8)
SODIUM SERPL-SCNC: 137 MMOL/L (ref 136–145)
T4 FREE SERPL-MCNC: 1.11 NG/DL (ref 0.92–1.68)
TRIGL SERPL-MCNC: 503 MG/DL (ref 0–150)
TSH SERPL DL<=0.005 MIU/L-ACNC: 1.26 UIU/ML (ref 0.27–4.2)
URATE SERPL-MCNC: 4.1 MG/DL (ref 3.4–7)
VIT B12 SERPL-MCNC: 882 PG/ML (ref 211–946)
VLDLC SERPL CALC-MCNC: 79 MG/DL (ref 5–40)
WBC # BLD AUTO: 4.86 10*3/MM3 (ref 3.4–10.8)

## 2024-06-04 PROCEDURE — 99214 OFFICE O/P EST MOD 30 MIN: CPT | Performed by: INTERNAL MEDICINE

## 2024-06-04 RX ORDER — GLIPIZIDE 5 MG/1
5 TABLET ORAL
Qty: 60 TABLET | Refills: 5 | Status: SHIPPED | OUTPATIENT
Start: 2024-06-04

## 2024-06-04 RX ORDER — ACYCLOVIR 400 MG/1
1 TABLET ORAL
Qty: 3 EACH | Refills: 0 | COMMUNITY
Start: 2024-06-04

## 2024-06-04 NOTE — PROGRESS NOTES
Subjective     Patient ID: Vaughn Huynh is a 58 y.o. male. Patient is here for management of multiple medical problems.     Chief Complaint   Patient presents with    Diabetes     History of Present Illness   Dm  Ha1c 10.3.     Insurance not covering rybelus.   Had good results with meds.     Joint pain.          The following portions of the patient's history were reviewed and updated as appropriate: allergies, current medications, past family history, past medical history, past social history, past surgical history and problem list.    Review of Systems    Current Outpatient Medications:     apixaban (ELIQUIS) 5 MG tablet tablet, Take 1 tablet by mouth Every 12 (Twelve) Hours. Indications: Atrial Fibrillation, Disp: 180 tablet, Rfl: 3    aspirin 81 MG tablet, Take  by mouth daily., Disp: , Rfl:     betamethasone dipropionate 0.05 % cream, APPLY TOPICALLY TO THE AFFECTED AREA TWICE DAILY AS DIRECTED, Disp: 15 g, Rfl: 0    Continuous Glucose Sensor (Dexcom G7 Sensor) misc, Use 1 each Every 10 (Ten) Days., Disp: 3 each, Rfl: 0    fenofibrate micronized (LOFIBRA) 200 MG capsule, Take 1 capsule by mouth Every Morning Before Breakfast., Disp: , Rfl:     glucose monitor monitoring kit, 1 each As Needed (bs)., Disp: 1 each, Rfl: 1    levocetirizine (XYZAL) 5 MG tablet, Take 1 tablet by mouth Every Evening., Disp: 90 tablet, Rfl: 0    lisinopril-hydrochlorothiazide (PRINZIDE,ZESTORETIC) 20-12.5 MG per tablet, Take 1 tablet by mouth 2 (Two) Times a Day., Disp: 180 tablet, Rfl: 3    metFORMIN (GLUCOPHAGE) 500 MG tablet, TAKE 1 TABLET BY MOUTH TWICE DAILY WITH MEALS, Disp: 180 tablet, Rfl: 3    metoprolol succinate XL (TOPROL-XL) 25 MG 24 hr tablet, Take 1 tablet by mouth Daily., Disp: 90 tablet, Rfl: 3    ONE TOUCH LANCETS misc, E11.9 use what is covered by insurance., Disp: 100 each, Rfl: 12    OneTouch Ultra test strip, TEST DAILY AS DIRECTED, Disp: 200 each, Rfl: 1    rosuvastatin (CRESTOR) 20 MG tablet, Take 1 tablet  "by mouth Daily., Disp: , Rfl:     glipizide (Glucotrol) 5 MG tablet, Take 1 tablet by mouth 2 (Two) Times a Day Before Meals., Disp: 60 tablet, Rfl: 5    Objective      Blood pressure 110/78, pulse 82, temperature 97.6 °F (36.4 °C), resp. rate 16, height 188 cm (74.02\"), weight 89.4 kg (197 lb), SpO2 98%.            Physical Exam     General Appearance:    Alert, cooperative, no distress, appears stated age   Head:    Normocephalic, without obvious abnormality, atraumatic   Eyes:    PERRL, conjunctiva/corneas clear, EOM's intact   Ears:    Normal TM's and external ear canals, both ears   Nose:   Nares normal, septum midline, mucosa normal, no drainage   or sinus tenderness   Throat:   Lips, mucosa, and tongue normal; teeth and gums normal   Neck:   Supple, symmetrical, trachea midline, no adenopathy;        thyroid:  No enlargement/tenderness/nodules; no carotid    bruit or JVD   Back:     Symmetric, no curvature, ROM normal, no CVA tenderness   Lungs:     Clear to auscultation bilaterally, respirations unlabored   Chest wall:    No tenderness or deformity   Heart:    Regular rate and rhythm, S1 and S2 normal, no murmur,        rub or gallop   Abdomen:     Soft, non-tender, bowel sounds active all four quadrants,     no masses, no organomegaly   Extremities:   Extremities normal, atraumatic, no cyanosis or edema   Pulses:   2+ and symmetric all extremities   Skin:   Skin color, texture, turgor normal, no rashes or lesions   Lymph nodes:   Cervical, supraclavicular, and axillary nodes normal   Neurologic:   CNII-XII intact. Normal strength, sensation and reflexes       throughout      Results for orders placed or performed in visit on 05/14/24   Comprehensive Metabolic Panel    Specimen: Blood   Result Value Ref Range    Glucose 250 (H) 65 - 99 mg/dL    BUN 14 6 - 20 mg/dL    Creatinine 0.84 0.76 - 1.27 mg/dL    EGFR Result 101.1 >60.0 mL/min/1.73    BUN/Creatinine Ratio 16.7 7.0 - 25.0    Sodium 137 136 - 145 mmol/L "    Potassium 4.4 3.5 - 5.2 mmol/L    Chloride 99 98 - 107 mmol/L    Total CO2 24.8 22.0 - 29.0 mmol/L    Calcium 9.2 8.6 - 10.5 mg/dL    Total Protein 6.7 6.0 - 8.5 g/dL    Albumin 4.4 3.5 - 5.2 g/dL    Globulin 2.3 gm/dL    A/G Ratio 1.9 g/dL    Total Bilirubin 0.5 0.0 - 1.2 mg/dL    Alkaline Phosphatase 74 39 - 117 U/L    AST (SGOT) 20 1 - 40 U/L    ALT (SGPT) 25 1 - 41 U/L   Vitamin B12    Specimen: Blood   Result Value Ref Range    Vitamin B-12 882 211 - 946 pg/mL   PSA Screen    Specimen: Blood   Result Value Ref Range    PSA 1.140 0.000 - 4.000 ng/mL   Lipid Panel    Specimen: Blood   Result Value Ref Range    Total Cholesterol 179 0 - 200 mg/dL    Triglycerides 503 (H) 0 - 150 mg/dL    HDL Cholesterol 30 (L) 40 - 60 mg/dL    VLDL Cholesterol Palmer 79 (H) 5 - 40 mg/dL    LDL Chol Calc (NIH) 70 0 - 100 mg/dL   TSH    Specimen: Blood   Result Value Ref Range    TSH 1.260 0.270 - 4.200 uIU/mL   T4, Free    Specimen: Blood   Result Value Ref Range    Free T4 1.11 0.92 - 1.68 ng/dL   Uric acid    Specimen: Blood   Result Value Ref Range    Uric Acid 4.1 3.4 - 7.0 mg/dL   Hemoglobin A1c   Result Value Ref Range    Hemoglobin A1C 10.30 (H) 4.80 - 5.60 %   MicroAlbumin, Urine, Random -   Result Value Ref Range    Microalbumin, Urine     CBC & Differential    Specimen: Blood   Result Value Ref Range    WBC 4.86 3.40 - 10.80 10*3/mm3    RBC 5.28 4.14 - 5.80 10*6/mm3    Hemoglobin 14.8 13.0 - 17.7 g/dL    Hematocrit 45.3 37.5 - 51.0 %    MCV 85.8 79.0 - 97.0 fL    MCH 28.0 26.6 - 33.0 pg    MCHC 32.7 31.5 - 35.7 g/dL    RDW 12.8 12.3 - 15.4 %    Platelets 266 140 - 450 10*3/mm3    Neutrophil Rel % 60.7 42.7 - 76.0 %    Lymphocyte Rel % 28.0 19.6 - 45.3 %    Monocyte Rel % 7.2 5.0 - 12.0 %    Eosinophil Rel % 2.9 0.3 - 6.2 %    Basophil Rel % 0.8 0.0 - 1.5 %    Neutrophils Absolute 2.95 1.70 - 7.00 10*3/mm3    Lymphocytes Absolute 1.36 0.70 - 3.10 10*3/mm3    Monocytes Absolute 0.35 0.10 - 0.90 10*3/mm3    Eosinophils  Absolute 0.14 0.00 - 0.40 10*3/mm3    Basophils Absolute 0.04 0.00 - 0.20 10*3/mm3    Immature Granulocyte Rel % 0.4 0.0 - 0.5 %    Immature Grans Absolute 0.02 0.00 - 0.05 10*3/mm3    nRBC 0.0 0.0 - 0.2 /100 WBC         Assessment & Plan     Hyper trig, dm and arthritis. Increase water. Will need to do formal exercise.       Right knee is in need for TKR.   Pt would like to increase exercising.     Will start glipizide. Insu unwilling to cover better meds.      Discussed exercise options.                Diagnoses and all orders for this visit:    1. Type 2 diabetes mellitus without complication, without long-term current use of insulin  -     Continuous Glucose Sensor (Dexcom G7 Sensor) misc; Use 1 each Every 10 (Ten) Days.  Dispense: 3 each; Refill: 0  -     Comprehensive Metabolic Panel  -     Hemoglobin A1c    2. Pure hypercholesterolemia  -     Continuous Glucose Sensor (Dexcom G7 Sensor) misc; Use 1 each Every 10 (Ten) Days.  Dispense: 3 each; Refill: 0  -     Comprehensive Metabolic Panel  -     Hemoglobin A1c    Other orders  -     glipizide (Glucotrol) 5 MG tablet; Take 1 tablet by mouth 2 (Two) Times a Day Before Meals.  Dispense: 60 tablet; Refill: 5      Return in about 6 weeks (around 7/16/2024).          There are no Patient Instructions on file for this visit.     Sen Nichole MD    Assessment & Plan       Answers submitted by the patient for this visit:  Primary Reason for Visit (Submitted on 6/2/2024)  What is the primary reason for your visit?: Other  Other (Submitted on 6/2/2024)  Please describe your symptoms.: 6 month  check  Have you had these symptoms before?: Yes  How long have you been having these symptoms?: Greater than 2 weeks

## 2024-06-28 ENCOUNTER — TRANSCRIBE ORDERS (OUTPATIENT)
Dept: DIABETES SERVICES | Facility: HOSPITAL | Age: 59
End: 2024-06-28
Payer: COMMERCIAL

## 2024-06-28 DIAGNOSIS — E11.9 DIABETES MELLITUS WITHOUT COMPLICATION: Primary | ICD-10-CM

## 2024-07-10 RX ORDER — BLOOD SUGAR DIAGNOSTIC
STRIP MISCELLANEOUS
Qty: 300 EACH | Refills: 1 | Status: SHIPPED | OUTPATIENT
Start: 2024-07-10

## 2024-07-11 DIAGNOSIS — I10 ESSENTIAL HYPERTENSION: ICD-10-CM

## 2024-07-11 RX ORDER — LISINOPRIL AND HYDROCHLOROTHIAZIDE 20; 12.5 MG/1; MG/1
1 TABLET ORAL 2 TIMES DAILY
Qty: 180 TABLET | Refills: 3 | Status: SHIPPED | OUTPATIENT
Start: 2024-07-11

## 2024-07-15 ENCOUNTER — OFFICE VISIT (OUTPATIENT)
Dept: INTERNAL MEDICINE | Facility: CLINIC | Age: 59
End: 2024-07-15
Payer: COMMERCIAL

## 2024-07-15 VITALS
OXYGEN SATURATION: 97 % | RESPIRATION RATE: 16 BRPM | SYSTOLIC BLOOD PRESSURE: 118 MMHG | DIASTOLIC BLOOD PRESSURE: 68 MMHG | WEIGHT: 204 LBS | HEIGHT: 74 IN | BODY MASS INDEX: 26.18 KG/M2 | TEMPERATURE: 97.5 F | HEART RATE: 79 BPM

## 2024-07-15 DIAGNOSIS — E11.9 TYPE 2 DIABETES MELLITUS WITHOUT COMPLICATION, WITHOUT LONG-TERM CURRENT USE OF INSULIN: ICD-10-CM

## 2024-07-15 DIAGNOSIS — I10 HYPERTENSION, UNSPECIFIED TYPE: Primary | ICD-10-CM

## 2024-07-15 LAB
EXPIRATION DATE: ABNORMAL
HBA1C MFR BLD: 9.1 % (ref 4.5–5.7)
Lab: ABNORMAL

## 2024-07-15 PROCEDURE — 83036 HEMOGLOBIN GLYCOSYLATED A1C: CPT | Performed by: INTERNAL MEDICINE

## 2024-07-15 PROCEDURE — 99214 OFFICE O/P EST MOD 30 MIN: CPT | Performed by: INTERNAL MEDICINE

## 2024-07-15 RX ORDER — METOPROLOL SUCCINATE 25 MG/1
25 TABLET, EXTENDED RELEASE ORAL DAILY
Qty: 90 TABLET | Refills: 3 | Status: SHIPPED | OUTPATIENT
Start: 2024-07-15

## 2024-07-15 RX ORDER — ROSUVASTATIN CALCIUM 20 MG/1
20 TABLET, COATED ORAL DAILY
Qty: 90 TABLET | Refills: 3 | Status: SHIPPED | OUTPATIENT
Start: 2024-07-15

## 2024-07-15 NOTE — PROGRESS NOTES
Subjective     Patient ID: Vaughn Huynh is a 58 y.o. male. Patient is here for management of multiple medical problems.     Chief Complaint   Patient presents with    Diabetes     History of Present Illness   DM          The following portions of the patient's history were reviewed and updated as appropriate: allergies, current medications, past family history, past medical history, past social history, past surgical history and problem list.    Review of Systems    Current Outpatient Medications:     apixaban (ELIQUIS) 5 MG tablet tablet, Take 1 tablet by mouth Every 12 (Twelve) Hours. Indications: Atrial Fibrillation, Disp: 180 tablet, Rfl: 3    aspirin 81 MG tablet, Take  by mouth daily., Disp: , Rfl:     betamethasone dipropionate 0.05 % cream, APPLY TOPICALLY TO THE AFFECTED AREA TWICE DAILY AS DIRECTED, Disp: 15 g, Rfl: 0    Continuous Glucose Sensor (Dexcom G7 Sensor) misc, Use 1 each Every 10 (Ten) Days., Disp: 3 each, Rfl: 0    fenofibrate micronized (LOFIBRA) 200 MG capsule, Take 1 capsule by mouth Every Morning Before Breakfast., Disp: , Rfl:     glipizide (Glucotrol) 5 MG tablet, Take 1 tablet by mouth 2 (Two) Times a Day Before Meals., Disp: 60 tablet, Rfl: 5    glucose blood (OneTouch Ultra) test strip, USE TO TEST EVERY DAY AS DIRECTED, Disp: 300 each, Rfl: 1    glucose monitor monitoring kit, 1 each As Needed (bs)., Disp: 1 each, Rfl: 1    levocetirizine (XYZAL) 5 MG tablet, Take 1 tablet by mouth Every Evening., Disp: 90 tablet, Rfl: 0    lisinopril-hydrochlorothiazide (PRINZIDE,ZESTORETIC) 20-12.5 MG per tablet, TAKE 1 TABLET BY MOUTH TWICE DAILY, Disp: 180 tablet, Rfl: 3    metFORMIN (GLUCOPHAGE) 500 MG tablet, Take 1 tablet by mouth 2 (Two) Times a Day With Meals., Disp: 180 tablet, Rfl: 3    metoprolol succinate XL (TOPROL-XL) 25 MG 24 hr tablet, Take 1 tablet by mouth Daily., Disp: 90 tablet, Rfl: 3    ONE TOUCH LANCETS misc, E11.9 use what is covered by insurance., Disp: 100 each, Rfl: 12    " rosuvastatin (CRESTOR) 20 MG tablet, Take 1 tablet by mouth Daily., Disp: 90 tablet, Rfl: 3    Objective      Blood pressure 118/68, pulse 79, temperature 97.5 °F (36.4 °C), resp. rate 16, height 188 cm (74.02\"), weight 92.5 kg (204 lb), SpO2 97%.            Physical Exam     General Appearance:    Alert, cooperative, no distress, appears stated age   Head:    Normocephalic, without obvious abnormality, atraumatic   Eyes:    PERRL, conjunctiva/corneas clear, EOM's intact   Ears:    Normal TM's and external ear canals, both ears   Nose:   Nares normal, septum midline, mucosa normal, no drainage   or sinus tenderness   Throat:   Lips, mucosa, and tongue normal; teeth and gums normal   Neck:   Supple, symmetrical, trachea midline, no adenopathy;        thyroid:  No enlargement/tenderness/nodules; no carotid    bruit or JVD   Back:     Symmetric, no curvature, ROM normal, no CVA tenderness   Lungs:     Clear to auscultation bilaterally, respirations unlabored   Chest wall:    No tenderness or deformity   Heart:    Regular rate and rhythm, S1 and S2 normal, no murmur,        rub or gallop   Abdomen:     Soft, non-tender, bowel sounds active all four quadrants,     no masses, no organomegaly   Extremities:   Extremities normal, atraumatic, no cyanosis or edema   Pulses:   2+ and symmetric all extremities   Skin:   Skin color, texture, turgor normal, no rashes or lesions   Lymph nodes:   Cervical, supraclavicular, and axillary nodes normal   Neurologic:   CNII-XII intact. Normal strength, sensation and reflexes       throughout      Results for orders placed or performed in visit on 05/14/24   Comprehensive Metabolic Panel    Specimen: Blood   Result Value Ref Range    Glucose 250 (H) 65 - 99 mg/dL    BUN 14 6 - 20 mg/dL    Creatinine 0.84 0.76 - 1.27 mg/dL    EGFR Result 101.1 >60.0 mL/min/1.73    BUN/Creatinine Ratio 16.7 7.0 - 25.0    Sodium 137 136 - 145 mmol/L    Potassium 4.4 3.5 - 5.2 mmol/L    Chloride 99 98 - 107 " mmol/L    Total CO2 24.8 22.0 - 29.0 mmol/L    Calcium 9.2 8.6 - 10.5 mg/dL    Total Protein 6.7 6.0 - 8.5 g/dL    Albumin 4.4 3.5 - 5.2 g/dL    Globulin 2.3 gm/dL    A/G Ratio 1.9 g/dL    Total Bilirubin 0.5 0.0 - 1.2 mg/dL    Alkaline Phosphatase 74 39 - 117 U/L    AST (SGOT) 20 1 - 40 U/L    ALT (SGPT) 25 1 - 41 U/L   Vitamin B12    Specimen: Blood   Result Value Ref Range    Vitamin B-12 882 211 - 946 pg/mL   PSA Screen    Specimen: Blood   Result Value Ref Range    PSA 1.140 0.000 - 4.000 ng/mL   Lipid Panel    Specimen: Blood   Result Value Ref Range    Total Cholesterol 179 0 - 200 mg/dL    Triglycerides 503 (H) 0 - 150 mg/dL    HDL Cholesterol 30 (L) 40 - 60 mg/dL    VLDL Cholesterol Palmer 79 (H) 5 - 40 mg/dL    LDL Chol Calc (NIH) 70 0 - 100 mg/dL   TSH    Specimen: Blood   Result Value Ref Range    TSH 1.260 0.270 - 4.200 uIU/mL   T4, Free    Specimen: Blood   Result Value Ref Range    Free T4 1.11 0.92 - 1.68 ng/dL   Uric acid    Specimen: Blood   Result Value Ref Range    Uric Acid 4.1 3.4 - 7.0 mg/dL   Hemoglobin A1c   Result Value Ref Range    Hemoglobin A1C 10.30 (H) 4.80 - 5.60 %   MicroAlbumin, Urine, Random -   Result Value Ref Range    Microalbumin, Urine 41.9 Not Estab. ug/mL   CBC & Differential    Specimen: Blood   Result Value Ref Range    WBC 4.86 3.40 - 10.80 10*3/mm3    RBC 5.28 4.14 - 5.80 10*6/mm3    Hemoglobin 14.8 13.0 - 17.7 g/dL    Hematocrit 45.3 37.5 - 51.0 %    MCV 85.8 79.0 - 97.0 fL    MCH 28.0 26.6 - 33.0 pg    MCHC 32.7 31.5 - 35.7 g/dL    RDW 12.8 12.3 - 15.4 %    Platelets 266 140 - 450 10*3/mm3    Neutrophil Rel % 60.7 42.7 - 76.0 %    Lymphocyte Rel % 28.0 19.6 - 45.3 %    Monocyte Rel % 7.2 5.0 - 12.0 %    Eosinophil Rel % 2.9 0.3 - 6.2 %    Basophil Rel % 0.8 0.0 - 1.5 %    Neutrophils Absolute 2.95 1.70 - 7.00 10*3/mm3    Lymphocytes Absolute 1.36 0.70 - 3.10 10*3/mm3    Monocytes Absolute 0.35 0.10 - 0.90 10*3/mm3    Eosinophils Absolute 0.14 0.00 - 0.40 10*3/mm3     Basophils Absolute 0.04 0.00 - 0.20 10*3/mm3    Immature Granulocyte Rel % 0.4 0.0 - 0.5 %    Immature Grans Absolute 0.02 0.00 - 0.05 10*3/mm3    nRBC 0.0 0.0 - 0.2 /100 WBC         Assessment & Plan       Dm lab not done  Diet changes discussed.    Not eating as much per his report. Off Rybelsus due to ins not covering. HA1c was 10  Will get repeat today.   Wt up on glipizid. Is exercising.           Diagnoses and all orders for this visit:    1. Hypertension, unspecified type (Primary)  -     metoprolol succinate XL (TOPROL-XL) 25 MG 24 hr tablet; Take 1 tablet by mouth Daily.  Dispense: 90 tablet; Refill: 3  -     rosuvastatin (CRESTOR) 20 MG tablet; Take 1 tablet by mouth Daily.  Dispense: 90 tablet; Refill: 3  -     POCT glycated hemoglobin, total    2. Type 2 diabetes mellitus without complication, without long-term current use of insulin  -     metFORMIN (GLUCOPHAGE) 500 MG tablet; Take 1 tablet by mouth 2 (Two) Times a Day With Meals.  Dispense: 180 tablet; Refill: 3  -     POCT glycated hemoglobin, total      Return in about 3 months (around 10/15/2024).          There are no Patient Instructions on file for this visit.     Sen Nichole MD    Assessment & Plan

## 2024-08-06 ENCOUNTER — HOSPITAL ENCOUNTER (OUTPATIENT)
Dept: DIABETES SERVICES | Facility: HOSPITAL | Age: 59
Setting detail: RECURRING SERIES
Discharge: HOME | End: 2024-08-06
Payer: COMMERCIAL

## 2024-08-06 PROCEDURE — G0109 DIAB MANAGE TRN IND/GROUP: HCPCS

## 2024-08-06 NOTE — PROGRESS NOTES
Patient attended an in person initial diabetes education class for 90 minutes. [facility] users please see media tab for assessments and notes. Non-[employer] users, assessments and notes will be sent per protocol.

## 2024-08-10 DIAGNOSIS — E11.9 TYPE 2 DIABETES MELLITUS WITHOUT COMPLICATION, WITHOUT LONG-TERM CURRENT USE OF INSULIN: ICD-10-CM

## 2024-09-20 ENCOUNTER — HOSPITAL ENCOUNTER (OUTPATIENT)
Dept: DIABETES SERVICES | Facility: HOSPITAL | Age: 59
Discharge: HOME | End: 2024-09-20
Payer: COMMERCIAL

## 2024-10-25 ENCOUNTER — OFFICE VISIT (OUTPATIENT)
Dept: INTERNAL MEDICINE | Facility: CLINIC | Age: 59
End: 2024-10-25
Payer: COMMERCIAL

## 2024-10-25 VITALS
HEART RATE: 78 BPM | HEIGHT: 74 IN | SYSTOLIC BLOOD PRESSURE: 120 MMHG | WEIGHT: 206 LBS | BODY MASS INDEX: 26.44 KG/M2 | DIASTOLIC BLOOD PRESSURE: 78 MMHG | OXYGEN SATURATION: 94 % | RESPIRATION RATE: 16 BRPM | TEMPERATURE: 97.4 F

## 2024-10-25 DIAGNOSIS — I10 ESSENTIAL HYPERTENSION: ICD-10-CM

## 2024-10-25 DIAGNOSIS — E11.9 TYPE 2 DIABETES MELLITUS WITHOUT COMPLICATION, WITHOUT LONG-TERM CURRENT USE OF INSULIN: ICD-10-CM

## 2024-10-25 DIAGNOSIS — G89.29 CHRONIC PAIN OF RIGHT KNEE: Primary | ICD-10-CM

## 2024-10-25 DIAGNOSIS — M25.561 CHRONIC PAIN OF RIGHT KNEE: Primary | ICD-10-CM

## 2024-10-25 DIAGNOSIS — Z12.5 PROSTATE CANCER SCREENING: ICD-10-CM

## 2024-10-25 DIAGNOSIS — E78.00 PURE HYPERCHOLESTEROLEMIA: ICD-10-CM

## 2024-10-25 LAB
ALBUMIN SERPL-MCNC: 4.3 G/DL (ref 3.5–5.2)
ALBUMIN/GLOB SERPL: 1.7 G/DL
ALP SERPL-CCNC: 62 U/L (ref 39–117)
ALT SERPL-CCNC: 31 U/L (ref 1–41)
AST SERPL-CCNC: 27 U/L (ref 1–40)
BILIRUB SERPL-MCNC: 0.3 MG/DL (ref 0–1.2)
BUN SERPL-MCNC: 23 MG/DL (ref 6–20)
BUN/CREAT SERPL: 24.7 (ref 7–25)
CALCIUM SERPL-MCNC: 9.6 MG/DL (ref 8.6–10.5)
CHLORIDE SERPL-SCNC: 104 MMOL/L (ref 98–107)
CO2 SERPL-SCNC: 26.3 MMOL/L (ref 22–29)
CREAT SERPL-MCNC: 0.93 MG/DL (ref 0.76–1.27)
EGFRCR SERPLBLD CKD-EPI 2021: 94.6 ML/MIN/1.73
GLOBULIN SER CALC-MCNC: 2.6 GM/DL
GLUCOSE SERPL-MCNC: 166 MG/DL (ref 65–99)
HBA1C MFR BLD: 8.2 % (ref 4.8–5.6)
POTASSIUM SERPL-SCNC: 4.5 MMOL/L (ref 3.5–5.2)
PROT SERPL-MCNC: 6.9 G/DL (ref 6–8.5)
SODIUM SERPL-SCNC: 141 MMOL/L (ref 136–145)

## 2024-10-25 PROCEDURE — 99214 OFFICE O/P EST MOD 30 MIN: CPT | Performed by: INTERNAL MEDICINE

## 2024-10-25 RX ORDER — GLIPIZIDE 5 MG/1
5 TABLET ORAL
Qty: 180 TABLET | Refills: 3 | Status: SHIPPED | OUTPATIENT
Start: 2024-10-25

## 2024-10-25 RX ORDER — ACYCLOVIR 400 MG/1
1 TABLET ORAL
Qty: 2 EACH | Refills: 0 | COMMUNITY
Start: 2024-10-25

## 2024-10-25 NOTE — PROGRESS NOTES
Subjective     Patient ID: Vaughn Huynh is a 59 y.o. male. Patient is here for management of multiple medical problems.     Chief Complaint   Patient presents with    Diabetes    Hypertension     History of Present Illness   DM    Working out now.     Hypertension      Pain now in left knee. And down shin.       The following portions of the patient's history were reviewed and updated as appropriate: allergies, current medications, past family history, past medical history, past social history, past surgical history and problem list.    Review of Systems    Current Outpatient Medications:     apixaban (ELIQUIS) 5 MG tablet tablet, Take 1 tablet by mouth Every 12 (Twelve) Hours. Indications: Atrial Fibrillation, Disp: 180 tablet, Rfl: 3    aspirin 81 MG tablet, Take  by mouth daily., Disp: , Rfl:     betamethasone dipropionate 0.05 % cream, APPLY TOPICALLY TO THE AFFECTED AREA TWICE DAILY AS DIRECTED, Disp: 15 g, Rfl: 0    Continuous Glucose Sensor (Dexcom G7 Sensor) misc, Use 1 each Every 10 (Ten) Days., Disp: 3 each, Rfl: 0    fenofibrate micronized (LOFIBRA) 200 MG capsule, Take 1 capsule by mouth Every Morning Before Breakfast., Disp: , Rfl:     glipizide (Glucotrol) 5 MG tablet, Take 1 tablet by mouth 2 (Two) Times a Day Before Meals., Disp: 180 tablet, Rfl: 3    glucose blood (OneTouch Ultra) test strip, USE TO TEST EVERY DAY AS DIRECTED, Disp: 300 each, Rfl: 1    glucose monitor monitoring kit, 1 each As Needed (bs)., Disp: 1 each, Rfl: 1    levocetirizine (XYZAL) 5 MG tablet, Take 1 tablet by mouth Every Evening., Disp: 90 tablet, Rfl: 0    lisinopril-hydrochlorothiazide (PRINZIDE,ZESTORETIC) 20-12.5 MG per tablet, TAKE 1 TABLET BY MOUTH TWICE DAILY, Disp: 180 tablet, Rfl: 3    metFORMIN (GLUCOPHAGE) 500 MG tablet, Take 1 tablet by mouth 2 (Two) Times a Day With Meals., Disp: 180 tablet, Rfl: 3    metoprolol succinate XL (TOPROL-XL) 25 MG 24 hr tablet, Take 1 tablet by mouth Daily., Disp: 90 tablet, Rfl:  "3    ONE TOUCH LANCETS misc, E11.9 use what is covered by insurance., Disp: 100 each, Rfl: 12    rosuvastatin (CRESTOR) 20 MG tablet, Take 1 tablet by mouth Daily., Disp: 90 tablet, Rfl: 3    Diclofenac Sodium (VOLTAREN) 1 % gel gel, Apply 4 g topically to the appropriate area as directed 4 (Four) Times a Day As Needed (pain)., Disp: 100 g, Rfl: 1    Objective      Blood pressure 120/78, pulse 78, temperature 97.4 °F (36.3 °C), resp. rate 16, height 188 cm (74.02\"), weight 93.4 kg (206 lb), SpO2 94%.            Physical Exam     General Appearance:    Alert, cooperative, no distress, appears stated age   Head:    Normocephalic, without obvious abnormality, atraumatic   Eyes:    PERRL, conjunctiva/corneas clear, EOM's intact   Ears:    Normal TM's and external ear canals, both ears   Nose:   Nares normal, septum midline, mucosa normal, no drainage   or sinus tenderness   Throat:   Lips, mucosa, and tongue normal; teeth and gums normal   Neck:   Supple, symmetrical, trachea midline, no adenopathy;        thyroid:  No enlargement/tenderness/nodules; no carotid    bruit or JVD   Back:     Symmetric, no curvature, ROM normal, no CVA tenderness   Lungs:     Clear to auscultation bilaterally, respirations unlabored   Chest wall:    No tenderness or deformity   Heart:    Regular rate and rhythm, S1 and S2 normal, no murmur,        rub or gallop   Abdomen:     Soft, non-tender, bowel sounds active all four quadrants,     no masses, no organomegaly   Extremities:   Extremities normal, atraumatic, no cyanosis or edema   Pulses:   2+ and symmetric all extremities   Skin:   Skin color, texture, turgor normal, no rashes or lesions   Lymph nodes:   Cervical, supraclavicular, and axillary nodes normal   Neurologic:   CNII-XII intact. Normal strength, sensation and reflexes       throughout      Results for orders placed or performed in visit on 07/15/24   POCT glycated hemoglobin, total    Collection Time: 07/15/24  9:08 AM    " Specimen: Urine   Result Value Ref Range    Hemoglobin A1C 9.1 (A) 4.5 - 5.7 %    Lot Number [id number]     Expiration Date 03/18/2026          Assessment & Plan     Start tumeric and osteo bi flex for knee pain. Modify work out. Discussed     Pt ha1c trending down.     Diagnoses and all orders for this visit:    1. Chronic pain of right knee (Primary)  -     XR Knee 3 View Right; Future    2. Essential hypertension  -     XR Knee 3 View Right; Future    3. Type 2 diabetes mellitus without complication, without long-term current use of insulin    Other orders  -     glipizide (Glucotrol) 5 MG tablet; Take 1 tablet by mouth 2 (Two) Times a Day Before Meals.  Dispense: 180 tablet; Refill: 3  -     Diclofenac Sodium (VOLTAREN) 1 % gel gel; Apply 4 g topically to the appropriate area as directed 4 (Four) Times a Day As Needed (pain).  Dispense: 100 g; Refill: 1      Return in about 4 months (around 2/25/2025).          There are no Patient Instructions on file for this visit.     Sen Nichole MD    Assessment & Plan

## 2024-12-16 ENCOUNTER — OFFICE VISIT (OUTPATIENT)
Dept: INTERNAL MEDICINE | Facility: CLINIC | Age: 59
End: 2024-12-16
Payer: COMMERCIAL

## 2024-12-16 VITALS
RESPIRATION RATE: 22 BRPM | SYSTOLIC BLOOD PRESSURE: 112 MMHG | BODY MASS INDEX: 26.69 KG/M2 | OXYGEN SATURATION: 94 % | HEART RATE: 91 BPM | HEIGHT: 74 IN | DIASTOLIC BLOOD PRESSURE: 78 MMHG | TEMPERATURE: 96.4 F | WEIGHT: 208 LBS

## 2024-12-16 DIAGNOSIS — J00 ACUTE RHINITIS: ICD-10-CM

## 2024-12-16 DIAGNOSIS — H65.413 CHRONIC ALLERGIC OTITIS MEDIA OF BOTH EARS: Primary | ICD-10-CM

## 2024-12-16 PROCEDURE — 99213 OFFICE O/P EST LOW 20 MIN: CPT | Performed by: STUDENT IN AN ORGANIZED HEALTH CARE EDUCATION/TRAINING PROGRAM

## 2024-12-16 RX ORDER — AZELASTINE 1 MG/ML
2 SPRAY, METERED NASAL 2 TIMES DAILY
Qty: 30 ML | Refills: 12 | Status: SHIPPED | OUTPATIENT
Start: 2024-12-16

## 2024-12-16 RX ORDER — BETAMETHASONE DIPROPIONATE 0.5 MG/G
CREAM TOPICAL 2 TIMES DAILY
Qty: 15 G | Refills: 3 | Status: SHIPPED | OUTPATIENT
Start: 2024-12-16

## 2024-12-16 RX ORDER — LEVOCETIRIZINE DIHYDROCHLORIDE 5 MG/1
5 TABLET, FILM COATED ORAL EVERY EVENING
Qty: 90 TABLET | Refills: 0 | Status: SHIPPED | OUTPATIENT
Start: 2024-12-16

## 2024-12-16 RX ORDER — FLUTICASONE PROPIONATE 50 MCG
2 SPRAY, SUSPENSION (ML) NASAL DAILY
Qty: 16 G | Refills: 0 | Status: SHIPPED | OUTPATIENT
Start: 2024-12-16

## 2024-12-16 NOTE — PROGRESS NOTES
Subjective   Vaughn Huynh is a 59 y.o. male.     History of Present Illness  Pt presents with 1 month of ear pressure and congestion. Worsening tinnitus. No pain or drainage. No fever      The following portions of the patient's history were reviewed and updated as appropriate: allergies, current medications, past family history, past medical history, past social history, past surgical history, and problem list.    Review of Systems   All other systems reviewed and are negative.      Objective   Physical Exam  Vitals and nursing note reviewed.   Constitutional:       Appearance: Normal appearance. He is normal weight.   HENT:      Head: Normocephalic and atraumatic.      Right Ear: External ear normal.      Left Ear: External ear normal.      Nose: Nose normal.      Mouth/Throat:      Mouth: Mucous membranes are moist.      Pharynx: Oropharynx is clear.   Eyes:      Extraocular Movements: Extraocular movements intact.      Conjunctiva/sclera: Conjunctivae normal.      Pupils: Pupils are equal, round, and reactive to light.   Cardiovascular:      Rate and Rhythm: Normal rate and regular rhythm.      Pulses: Normal pulses.      Heart sounds: Normal heart sounds. No murmur heard.     No gallop.   Pulmonary:      Effort: Pulmonary effort is normal. No respiratory distress.      Breath sounds: Normal breath sounds. No wheezing, rhonchi or rales.   Abdominal:      General: Abdomen is flat. Bowel sounds are normal.      Palpations: Abdomen is soft.   Musculoskeletal:         General: Normal range of motion.      Cervical back: Normal range of motion and neck supple. No rigidity or tenderness.   Lymphadenopathy:      Cervical: No cervical adenopathy.   Skin:     General: Skin is warm.      Capillary Refill: Capillary refill takes less than 2 seconds.      Coloration: Skin is not jaundiced or pale.      Findings: No bruising, erythema, lesion or rash.   Neurological:      General: No focal deficit present.      Mental  Status: He is alert and oriented to person, place, and time. Mental status is at baseline.   Psychiatric:         Mood and Affect: Mood normal.         Behavior: Behavior normal.         Thought Content: Thought content normal.         Judgment: Judgment normal.         Assessment & Plan   Diagnoses and all orders for this visit:    1. Chronic allergic otitis media of both ears (Primary)  -     fluticasone (FLONASE) 50 MCG/ACT nasal spray; Administer 2 sprays into the nostril(s) as directed by provider Daily.  Dispense: 16 g; Refill: 0  -     azelastine (ASTELIN) 0.1 % nasal spray; Administer 2 sprays into the nostril(s) as directed by provider 2 (Two) Times a Day. Use in each nostril as directed  Dispense: 30 mL; Refill: 12    2. Acute rhinitis  -     levocetirizine (XYZAL) 5 MG tablet; Take 1 tablet by mouth Every Evening.  Dispense: 90 tablet; Refill: 0    Other orders  -     betamethasone dipropionate (DIPROSONE) 0.05 % cream; Apply  topically to the appropriate area as directed 2 (Two) Times a Day.  Dispense: 15 g; Refill: 3

## 2024-12-18 ENCOUNTER — PRIOR AUTHORIZATION (OUTPATIENT)
Dept: INTERNAL MEDICINE | Facility: CLINIC | Age: 59
End: 2024-12-18
Payer: COMMERCIAL

## 2025-02-12 ENCOUNTER — OFFICE VISIT (OUTPATIENT)
Dept: INTERNAL MEDICINE | Facility: CLINIC | Age: 60
End: 2025-02-12
Payer: COMMERCIAL

## 2025-02-12 VITALS
RESPIRATION RATE: 16 BRPM | BODY MASS INDEX: 26.69 KG/M2 | DIASTOLIC BLOOD PRESSURE: 82 MMHG | HEART RATE: 88 BPM | TEMPERATURE: 97.2 F | SYSTOLIC BLOOD PRESSURE: 140 MMHG | WEIGHT: 208 LBS | HEIGHT: 74 IN | OXYGEN SATURATION: 97 %

## 2025-02-12 DIAGNOSIS — M79.604 LOW BACK PAIN RADIATING TO RIGHT LOWER EXTREMITY: ICD-10-CM

## 2025-02-12 DIAGNOSIS — E11.9 TYPE 2 DIABETES MELLITUS WITHOUT COMPLICATION, WITHOUT LONG-TERM CURRENT USE OF INSULIN: Primary | ICD-10-CM

## 2025-02-12 DIAGNOSIS — E78.00 PURE HYPERCHOLESTEROLEMIA: ICD-10-CM

## 2025-02-12 DIAGNOSIS — M54.50 LOW BACK PAIN RADIATING TO RIGHT LOWER EXTREMITY: ICD-10-CM

## 2025-02-12 PROCEDURE — 99214 OFFICE O/P EST MOD 30 MIN: CPT | Performed by: INTERNAL MEDICINE

## 2025-02-12 RX ORDER — ACYCLOVIR 400 MG/1
1 TABLET ORAL
Qty: 2 EACH | Refills: 0 | COMMUNITY
Start: 2025-02-12

## 2025-02-12 NOTE — PROGRESS NOTES
Subjective     Patient ID: Vaughn Huynh is a 59 y.o. male. Patient is here for management of multiple medical problems.     Chief Complaint   Patient presents with    Back Pain     Lower right back pain, right hip    Numbness     And tingling down the right leg     History of Present Illness   Lbp  Right.      Right hip paind .    Right leg radiculopathy      DM    .     The following portions of the patient's history were reviewed and updated as appropriate: allergies, current medications, past family history, past medical history, past social history, past surgical history and problem list.    Review of Systems    Current Outpatient Medications:     apixaban (ELIQUIS) 5 MG tablet tablet, Take 1 tablet by mouth Every 12 (Twelve) Hours. Indications: Atrial Fibrillation, Disp: 48 tablet, Rfl: 0    aspirin 81 MG tablet, Take  by mouth daily., Disp: , Rfl:     azelastine (ASTELIN) 0.1 % nasal spray, Administer 2 sprays into the nostril(s) as directed by provider 2 (Two) Times a Day. Use in each nostril as directed, Disp: 30 mL, Rfl: 12    betamethasone dipropionate (DIPROSONE) 0.05 % cream, Apply  topically to the appropriate area as directed 2 (Two) Times a Day., Disp: 15 g, Rfl: 3    Continuous Glucose Sensor (Dexcom G7 Sensor) misc, Use 1 each Every 10 (Ten) Days., Disp: 2 each, Rfl: 0    Diclofenac Sodium (VOLTAREN) 1 % gel gel, Apply 4 g topically to the appropriate area as directed 4 (Four) Times a Day As Needed (pain)., Disp: 100 g, Rfl: 1    fenofibrate micronized (LOFIBRA) 200 MG capsule, Take 1 capsule by mouth Every Morning Before Breakfast., Disp: , Rfl:     fluticasone (FLONASE) 50 MCG/ACT nasal spray, Administer 2 sprays into the nostril(s) as directed by provider Daily., Disp: 16 g, Rfl: 0    glipizide (Glucotrol) 5 MG tablet, Take 1 tablet by mouth 2 (Two) Times a Day Before Meals., Disp: 180 tablet, Rfl: 3    glucose blood (OneTouch Ultra) test strip, USE TO TEST EVERY DAY AS DIRECTED, Disp: 300  "each, Rfl: 1    glucose monitor monitoring kit, 1 each As Needed (bs)., Disp: 1 each, Rfl: 1    levocetirizine (XYZAL) 5 MG tablet, Take 1 tablet by mouth Every Evening., Disp: 90 tablet, Rfl: 0    lisinopril-hydrochlorothiazide (PRINZIDE,ZESTORETIC) 20-12.5 MG per tablet, TAKE 1 TABLET BY MOUTH TWICE DAILY, Disp: 180 tablet, Rfl: 3    metFORMIN (GLUCOPHAGE) 500 MG tablet, Take 1 tablet by mouth 2 (Two) Times a Day With Meals., Disp: 180 tablet, Rfl: 3    metoprolol succinate XL (TOPROL-XL) 25 MG 24 hr tablet, Take 1 tablet by mouth Daily., Disp: 90 tablet, Rfl: 3    ONE TOUCH LANCETS misc, E11.9 use what is covered by insurance., Disp: 100 each, Rfl: 12    rosuvastatin (CRESTOR) 20 MG tablet, Take 1 tablet by mouth Daily., Disp: 90 tablet, Rfl: 3    Objective      Blood pressure 140/82, pulse 88, temperature 97.2 °F (36.2 °C), resp. rate 16, height 188 cm (74.02\"), weight 94.3 kg (208 lb), SpO2 97%.            Physical Exam     General Appearance:    Alert, cooperative, no distress, appears stated age   Head:    Normocephalic, without obvious abnormality, atraumatic   Eyes:    PERRL, conjunctiva/corneas clear, EOM's intact   Ears:    Normal TM's and external ear canals, both ears   Nose:   Nares normal, septum midline, mucosa normal, no drainage   or sinus tenderness   Throat:   Lips, mucosa, and tongue normal; teeth and gums normal   Neck:   Supple, symmetrical, trachea midline, no adenopathy;        thyroid:  No enlargement/tenderness/nodules; no carotid    bruit or JVD   Back:     Symmetric, no curvature, ROM normal, no CVA tenderness   Lungs:     Clear to auscultation bilaterally, respirations unlabored   Chest wall:    No tenderness or deformity   Heart:    Regular rate and rhythm, S1 and S2 normal, no murmur,        rub or gallop   Abdomen:     Soft, non-tender, bowel sounds active all four quadrants,     no masses, no organomegaly   Extremities:   Extremities normal, atraumatic, no cyanosis or edema "   Pulses:   2+ and symmetric all extremities   Skin:   Skin color, texture, turgor normal, no rashes or lesions   Lymph nodes:   Cervical, supraclavicular, and axillary nodes normal   Neurologic:   CNII-XII intact. Normal strength, sensation and reflexes       throughout      Results for orders placed or performed in visit on 07/15/24   POCT glycated hemoglobin, total    Collection Time: 07/15/24  9:08 AM    Specimen: Urine   Result Value Ref Range    Hemoglobin A1C 9.1 (A) 4.5 - 5.7 %    Lot Number [id number]     Expiration Date 03/18/2026          Assessment & Plan     Prirformis syndrom. Doing better.   Will start streching exerxise.s.    Eustation tube dysfuntion.   Start warm steam humider.  Clean pap supplies.    Get labs done prior to next visit.              Diagnoses and all orders for this visit:    1. Type 2 diabetes mellitus without complication, without long-term current use of insulin (Primary)  -     Continuous Glucose Sensor (Dexcom G7 Sensor) misc; Use 1 each Every 10 (Ten) Days.  Dispense: 2 each; Refill: 0    2. Pure hypercholesterolemia  -     Continuous Glucose Sensor (Dexcom G7 Sensor) misc; Use 1 each Every 10 (Ten) Days.  Dispense: 2 each; Refill: 0    3. Low back pain radiating to right lower extremity    Other orders  -     apixaban (ELIQUIS) 5 MG tablet tablet; Take 1 tablet by mouth Every 12 (Twelve) Hours. Indications: Atrial Fibrillation  Dispense: 48 tablet; Refill: 0      No follow-ups on file.          There are no Patient Instructions on file for this visit.     Sen Nichole MD    Assessment & Plan

## 2025-02-13 LAB
ALBUMIN SERPL-MCNC: 4.8 G/DL (ref 3.5–5.2)
ALBUMIN/GLOB SERPL: 1.7 G/DL
ALP SERPL-CCNC: 70 U/L (ref 39–117)
ALT SERPL-CCNC: 47 U/L (ref 1–41)
AST SERPL-CCNC: 37 U/L (ref 1–40)
BASOPHILS # BLD AUTO: 0.04 10*3/MM3 (ref 0–0.2)
BASOPHILS NFR BLD AUTO: 0.7 % (ref 0–1.5)
BILIRUB SERPL-MCNC: 0.4 MG/DL (ref 0–1.2)
BUN SERPL-MCNC: 12 MG/DL (ref 6–20)
BUN/CREAT SERPL: 14 (ref 7–25)
CALCIUM SERPL-MCNC: 10.2 MG/DL (ref 8.6–10.5)
CHLORIDE SERPL-SCNC: 100 MMOL/L (ref 98–107)
CHOLEST SERPL-MCNC: 176 MG/DL (ref 0–200)
CO2 SERPL-SCNC: 27.1 MMOL/L (ref 22–29)
CREAT SERPL-MCNC: 0.86 MG/DL (ref 0.76–1.27)
EGFRCR SERPLBLD CKD-EPI 2021: 99.7 ML/MIN/1.73
EOSINOPHIL # BLD AUTO: 0.18 10*3/MM3 (ref 0–0.4)
EOSINOPHIL NFR BLD AUTO: 3.2 % (ref 0.3–6.2)
ERYTHROCYTE [DISTWIDTH] IN BLOOD BY AUTOMATED COUNT: 12.9 % (ref 12.3–15.4)
GLOBULIN SER CALC-MCNC: 2.8 GM/DL
GLUCOSE SERPL-MCNC: 171 MG/DL (ref 65–99)
HCT VFR BLD AUTO: 47.1 % (ref 37.5–51)
HDLC SERPL-MCNC: 32 MG/DL (ref 40–60)
HGB BLD-MCNC: 15.9 G/DL (ref 13–17.7)
IMM GRANULOCYTES # BLD AUTO: 0.03 10*3/MM3 (ref 0–0.05)
IMM GRANULOCYTES NFR BLD AUTO: 0.5 % (ref 0–0.5)
LDLC SERPL CALC-MCNC: 84 MG/DL (ref 0–100)
LYMPHOCYTES # BLD AUTO: 1.34 10*3/MM3 (ref 0.7–3.1)
LYMPHOCYTES NFR BLD AUTO: 23.6 % (ref 19.6–45.3)
MCH RBC QN AUTO: 28.5 PG (ref 26.6–33)
MCHC RBC AUTO-ENTMCNC: 33.8 G/DL (ref 31.5–35.7)
MCV RBC AUTO: 84.4 FL (ref 79–97)
MONOCYTES # BLD AUTO: 0.41 10*3/MM3 (ref 0.1–0.9)
MONOCYTES NFR BLD AUTO: 7.2 % (ref 5–12)
NEUTROPHILS # BLD AUTO: 3.68 10*3/MM3 (ref 1.7–7)
NEUTROPHILS NFR BLD AUTO: 64.8 % (ref 42.7–76)
NRBC BLD AUTO-RTO: 0 /100 WBC (ref 0–0.2)
PLATELET # BLD AUTO: 273 10*3/MM3 (ref 140–450)
POTASSIUM SERPL-SCNC: 4.3 MMOL/L (ref 3.5–5.2)
PROT SERPL-MCNC: 7.6 G/DL (ref 6–8.5)
PSA SERPL-MCNC: 1.38 NG/ML (ref 0–4)
RBC # BLD AUTO: 5.58 10*6/MM3 (ref 4.14–5.8)
SODIUM SERPL-SCNC: 140 MMOL/L (ref 136–145)
TRIGL SERPL-MCNC: 368 MG/DL (ref 0–150)
TSH SERPL DL<=0.005 MIU/L-ACNC: 1.52 UIU/ML (ref 0.27–4.2)
VLDLC SERPL CALC-MCNC: 60 MG/DL (ref 5–40)
WBC # BLD AUTO: 5.68 10*3/MM3 (ref 3.4–10.8)

## 2025-03-05 ENCOUNTER — OFFICE VISIT (OUTPATIENT)
Dept: INTERNAL MEDICINE | Facility: CLINIC | Age: 60
End: 2025-03-05
Payer: COMMERCIAL

## 2025-03-05 VITALS
OXYGEN SATURATION: 97 % | SYSTOLIC BLOOD PRESSURE: 120 MMHG | HEART RATE: 93 BPM | RESPIRATION RATE: 16 BRPM | TEMPERATURE: 97.4 F | WEIGHT: 204 LBS | BODY MASS INDEX: 26.18 KG/M2 | HEIGHT: 74 IN | DIASTOLIC BLOOD PRESSURE: 76 MMHG

## 2025-03-05 DIAGNOSIS — M75.101 NONTRAUMATIC TEAR OF RIGHT ROTATOR CUFF, UNSPECIFIED TEAR EXTENT: ICD-10-CM

## 2025-03-05 DIAGNOSIS — H91.93 BILATERAL HEARING LOSS, UNSPECIFIED HEARING LOSS TYPE: ICD-10-CM

## 2025-03-05 DIAGNOSIS — H69.93 DYSFUNCTION OF BOTH EUSTACHIAN TUBES: ICD-10-CM

## 2025-03-05 DIAGNOSIS — E11.9 TYPE 2 DIABETES MELLITUS WITHOUT COMPLICATION, WITHOUT LONG-TERM CURRENT USE OF INSULIN: Primary | ICD-10-CM

## 2025-03-05 LAB
EXPIRATION DATE: ABNORMAL
HBA1C MFR BLD: 9 % (ref 4.5–5.7)
Lab: ABNORMAL

## 2025-03-05 NOTE — PROGRESS NOTES
Subjective     Patient ID: Vaughn Huynh is a 59 y.o. male. Patient is here for management of multiple medical problems.     Chief Complaint   Patient presents with    Diabetes    Hip Pain     Right hip pain, with sciatica    Tinnitus     Bilateral ear     History of Present Illness   Right shoulder. Pop with reaching and lifting.     Dm. 9.0        [place].    Pirformis syntorms stretrches are helping. Not resolved.          The following portions of the patient's history were reviewed and updated as appropriate: allergies, current medications, past family history, past medical history, past social history, past surgical history and problem list.    Review of Systems    Current Outpatient Medications:     apixaban (ELIQUIS) 5 MG tablet tablet, Take 1 tablet by mouth Every 12 (Twelve) Hours. Indications: Atrial Fibrillation, Disp: 48 tablet, Rfl: 0    aspirin 81 MG tablet, Take  by mouth daily., Disp: , Rfl:     azelastine (ASTELIN) 0.1 % nasal spray, Administer 2 sprays into the nostril(s) as directed by provider 2 (Two) Times a Day. Use in each nostril as directed, Disp: 30 mL, Rfl: 12    betamethasone dipropionate (DIPROSONE) 0.05 % cream, Apply  topically to the appropriate area as directed 2 (Two) Times a Day., Disp: 15 g, Rfl: 3    Continuous Glucose Sensor (Dexcom G7 Sensor) misc, Use 1 each Every 10 (Ten) Days., Disp: 2 each, Rfl: 0    Diclofenac Sodium (VOLTAREN) 1 % gel gel, Apply 4 g topically to the appropriate area as directed 4 (Four) Times a Day As Needed (pain)., Disp: 100 g, Rfl: 1    fenofibrate micronized (LOFIBRA) 200 MG capsule, Take 1 capsule by mouth Every Morning Before Breakfast., Disp: , Rfl:     fluticasone (FLONASE) 50 MCG/ACT nasal spray, Administer 2 sprays into the nostril(s) as directed by provider Daily., Disp: 16 g, Rfl: 0    glipizide (Glucotrol) 5 MG tablet, Take 1 tablet by mouth 2 (Two) Times a Day Before Meals., Disp: 180 tablet, Rfl: 3    glucose blood (OneTouch Ultra)  "test strip, USE TO TEST EVERY DAY AS DIRECTED, Disp: 300 each, Rfl: 1    glucose monitor monitoring kit, 1 each As Needed (bs)., Disp: 1 each, Rfl: 1    levocetirizine (XYZAL) 5 MG tablet, Take 1 tablet by mouth Every Evening., Disp: 90 tablet, Rfl: 0    lisinopril-hydrochlorothiazide (PRINZIDE,ZESTORETIC) 20-12.5 MG per tablet, TAKE 1 TABLET BY MOUTH TWICE DAILY, Disp: 180 tablet, Rfl: 3    metFORMIN (GLUCOPHAGE) 500 MG tablet, Take 1 tablet by mouth 2 (Two) Times a Day With Meals., Disp: 180 tablet, Rfl: 3    metoprolol succinate XL (TOPROL-XL) 25 MG 24 hr tablet, Take 1 tablet by mouth Daily., Disp: 90 tablet, Rfl: 3    ONE TOUCH LANCETS misc, E11.9 use what is covered by insurance., Disp: 100 each, Rfl: 12    rosuvastatin (CRESTOR) 20 MG tablet, Take 1 tablet by mouth Daily., Disp: 90 tablet, Rfl: 3    Objective      Blood pressure 120/76, pulse 93, temperature 97.4 °F (36.3 °C), resp. rate 16, height 188 cm (74.02\"), weight 92.5 kg (204 lb), SpO2 97%.            Physical Exam     General Appearance:    Alert, cooperative, no distress, appears stated age   Head:    Normocephalic, without obvious abnormality, atraumatic   Eyes:    PERRL, conjunctiva/corneas clear, EOM's intact   Ears:    Normal TM's and external ear canals, both ears   Nose:   Nares normal, septum midline, mucosa normal, no drainage   or sinus tenderness   Throat:   Lips, mucosa, and tongue normal; teeth and gums normal   Neck:   Supple, symmetrical, trachea midline, no adenopathy;        thyroid:  No enlargement/tenderness/nodules; no carotid    bruit or JVD   Back:     Symmetric, no curvature, ROM normal, no CVA tenderness   Lungs:     Clear to auscultation bilaterally, respirations unlabored   Chest wall:    No tenderness or deformity   Heart:    Regular rate and rhythm, S1 and S2 normal, no murmur,        rub or gallop   Abdomen:     Soft, non-tender, bowel sounds active all four quadrants,     no masses, no organomegaly   Extremities:   " Unable to abduct right shoulder.   Extremities normal, atraumatic, no cyanosis or edema   Pulses:   2+ and symmetric all extremities   Skin:   Skin color, texture, turgor normal, no rashes or lesions   Lymph nodes:   Cervical, supraclavicular, and axillary nodes normal   Neurologic:   CNII-XII intact. Normal strength, sensation and reflexes       throughout      Results for orders placed or performed in visit on 03/05/25   POC Glycosylated Hemoglobin (Hb A1C)    Collection Time: 03/05/25  9:18 AM    Specimen: Blood   Result Value Ref Range    Hemoglobin A1C 9.0 (A) 4.5 - 5.7 %    Lot Number [id number]     Expiration Date 11/17/2026          Assessment & Plan     Trig up and hdl low.   Ha1c up.   Will need to det on strict low carb diet.      Diagnoses and all orders for this visit:    1. Type 2 diabetes mellitus without complication, without long-term current use of insulin (Primary)  -     POC Glycosylated Hemoglobin (Hb A1C)    2. Nontraumatic tear of right rotator cuff, unspecified tear extent  -     Ambulatory Referral to Orthopedic Surgery    3. Bilateral hearing loss, unspecified hearing loss type    4. Dysfunction of both eustachian tubes  -     Ambulatory Referral to ENT (Otolaryngology)          Return in about 6 weeks (around 4/16/2025).          There are no Patient Instructions on file for this visit.     Sen Nichole MD    Assessment & Plan

## 2025-04-17 ENCOUNTER — OFFICE VISIT (OUTPATIENT)
Dept: ORTHOPEDIC SURGERY | Facility: CLINIC | Age: 60
End: 2025-04-17
Payer: COMMERCIAL

## 2025-04-17 VITALS
HEIGHT: 74 IN | BODY MASS INDEX: 26.18 KG/M2 | WEIGHT: 204 LBS | DIASTOLIC BLOOD PRESSURE: 70 MMHG | SYSTOLIC BLOOD PRESSURE: 128 MMHG

## 2025-04-17 DIAGNOSIS — S49.91XA INJURY OF RIGHT SHOULDER, INITIAL ENCOUNTER: ICD-10-CM

## 2025-04-17 DIAGNOSIS — M75.101 ROTATOR CUFF SYNDROME OF RIGHT SHOULDER: ICD-10-CM

## 2025-04-17 DIAGNOSIS — M25.511 RIGHT SHOULDER PAIN, UNSPECIFIED CHRONICITY: Primary | ICD-10-CM

## 2025-04-17 RX ORDER — TRAMADOL HYDROCHLORIDE 50 MG/1
50 TABLET ORAL EVERY 6 HOURS PRN
Qty: 30 TABLET | Refills: 0 | Status: SHIPPED | OUTPATIENT
Start: 2025-04-17

## 2025-04-17 RX ORDER — CYCLOBENZAPRINE HCL 10 MG
10 TABLET ORAL 2 TIMES DAILY PRN
Qty: 40 TABLET | Refills: 1 | Status: SHIPPED | OUTPATIENT
Start: 2025-04-17

## 2025-04-17 NOTE — PROGRESS NOTES
[facility]        Subjective     Pain of the Right Shoulder      HPI    Vaugnh Huynh is a 59 y.o. male.  Patient is here today for new problem regarding his right shoulder.  He says that about 6 to 8 weeks ago, he was reaching to grab something in the back seat of his car and felt a pop and tearing sensation in the anterior aspect of his shoulder.  He initially could not move the shoulder but this is gotten a little bit better.  He is weak.  He has trouble sleeping.  He is here for further evaluation treatment.  He is left-hand-dominant.        Past Medical History:   Diagnosis Date    Adult BMI 27.0-27.9 kg/sq m     Arthritis     Arthritis of back     Cerebrovascular accident (CVA) 10/11/2019    Diabetes mellitus     Difficulty walking     Elevated cholesterol     Esophagitis     GERD (gastroesophageal reflux disease)     Heartburn     High blood triglycerides     Hyperlipidemia 07/18/2016    Hypertension 07/18/2016    Knee pain     right    Knee swelling     Myopathy 07/18/2016    Osteoarthritis 07/18/2016    right knee    Rotator cuff syndrome     Sleep apnea       Past Surgical History:   Procedure Laterality Date    BACK SURGERY  2012      Family History   Problem Relation Age of Onset    Cancer Mother         kindney    Dementia Father     Hypertension Father     Diabetes Father     Arthritis Father     Hyperlipidemia Father     Parkinsonism Father     Seizures Other     Cancer Other     Diabetes Other     Hypertension Other     Arthritis Other     Hypertension Other     Colon cancer Neg Hx     Liver cancer Neg Hx     Liver disease Neg Hx     Esophageal cancer Neg Hx     Stomach cancer Neg Hx      Social History     Socioeconomic History    Marital status:    Tobacco Use    Smoking status: Former     Current packs/day: 0.00     Average packs/day: 1 pack/day for 23.0 years (23.0 ttl pk-yrs)     Types: Cigarettes     Start date: 1/8/1983     Quit date: 1/8/2006     Years  since quittin.2    Smokeless tobacco: Never    Tobacco comments:     quit x 10 years   Vaping Use    Vaping status: Never Used   Substance and Sexual Activity    Alcohol use: Yes     Comment: VERY RARE     Drug use: No    Sexual activity: Defer      Current Outpatient Medications on File Prior to Visit   Medication Sig Dispense Refill    apixaban (ELIQUIS) 5 MG tablet tablet Take 1 tablet by mouth Every 12 (Twelve) Hours. Indications: Atrial Fibrillation 48 tablet 0    aspirin 81 MG tablet Take  by mouth daily.      azelastine (ASTELIN) 0.1 % nasal spray Administer 2 sprays into the nostril(s) as directed by provider 2 (Two) Times a Day. Use in each nostril as directed 30 mL 12    betamethasone dipropionate (DIPROSONE) 0.05 % cream Apply  topically to the appropriate area as directed 2 (Two) Times a Day. 15 g 3    Continuous Glucose Sensor (Dexcom G7 Sensor) misc Use 1 each Every 10 (Ten) Days. 2 each 0    Diclofenac Sodium (VOLTAREN) 1 % gel gel Apply 4 g topically to the appropriate area as directed 4 (Four) Times a Day As Needed (pain). 100 g 1    fenofibrate micronized (LOFIBRA) 200 MG capsule Take 1 capsule by mouth Every Morning Before Breakfast.      fluticasone (FLONASE) 50 MCG/ACT nasal spray Administer 2 sprays into the nostril(s) as directed by provider Daily. 16 g 0    glipizide (Glucotrol) 5 MG tablet Take 1 tablet by mouth 2 (Two) Times a Day Before Meals. 180 tablet 3    glucose blood (OneTouch Ultra) test strip USE TO TEST EVERY DAY AS DIRECTED 300 each 1    glucose monitor monitoring kit 1 each As Needed (bs). 1 each 1    levocetirizine (XYZAL) 5 MG tablet Take 1 tablet by mouth Every Evening. 90 tablet 0    lisinopril-hydrochlorothiazide (PRINZIDE,ZESTORETIC) 20-12.5 MG per tablet TAKE 1 TABLET BY MOUTH TWICE DAILY 180 tablet 3    metFORMIN (GLUCOPHAGE) 500 MG tablet Take 1 tablet by mouth 2 (Two) Times a Day With Meals. 180 tablet 3    metoprolol succinate XL (TOPROL-XL) 25 MG 24 hr tablet  "Take 1 tablet by mouth Daily. 90 tablet 3    ONE TOUCH LANCETS misc E11.9 use what is covered by insurance. 100 each 12    rosuvastatin (CRESTOR) 20 MG tablet Take 1 tablet by mouth Daily. 90 tablet 3     No current facility-administered medications on file prior to visit.      Allergies   Allergen Reactions    Statins Unknown - Low Severity     Other reaction(s): Rhabdomyolysis          Review of Systems   Constitutional: Negative.    HENT: Negative.     Eyes: Negative.    Respiratory: Negative.     Cardiovascular: Negative.    Gastrointestinal: Negative.    Endocrine: Negative.    Genitourinary: Negative.    Musculoskeletal: Negative.    Skin: Negative.    Allergic/Immunologic: Negative.    Neurological: Negative.    Hematological: Negative.    Psychiatric/Behavioral: Negative.          I reviewed the patient's chief complaint, history of present illness, review of systems, past medical history, surgical history, family history, social history, medications and allergy list.        Objective      Physical Exam  /70   Ht 188 cm (74\")   Wt 92.5 kg (204 lb)   BMI 26.19 kg/m²     Body mass index is 26.19 kg/m².    General  Mental Status - alert  General Appearance - cooperative, well groomed, not in acute distress  Orientation - Oriented X3  Build & Nutrition - well developed and well nourished  Posture - normal posture  Gait - normal gait       Ortho Exam  Musculoskeletal   Upper Extremity   Right Shoulder     Inspection and Palpation:     Medial border scapular tenderness-none    AC Joint Tenderness -none    Sensation is normal    Examination reveals no ecchymosis.        Strength and Tone:    Supraspinatus -4-5 with pain    External Twjqeumv-4-3 with pain    Infraspinatus - 5/5    Subscapularis - 5/5 without pain    Deltoid - 5/5     Range of Motion      RightShoulder:    Internal Rotation: ROM - L4    External Rotation: AROM - 60 degrees    Elevation through flexion: AROM - 140 degrees "        Impingement   Right shoulder    Barker-Jay impingement test negative    Neer impingement test negative     Functional Testing   Right shoulder    AC crossover adduction test negative    Speeds test negative    Uppercut test negative    O'Briens test negative    Drop arm sign positive    Apprehension relocation negative      Imaging/Studies Reviewed and Interpreted:  Imaging Results (Last 24 Hours)       Procedure Component Value Units Date/Time    XR Shoulder 2+ View Right [[id number]] Resulted: 04/17/25 1034     Updated: 04/17/25 1034    Narrative:      Right Shoulder X-Ray    Indication: Pain    Study:  Grashey AP, axillary lateral, and scapular Y views    Comparison: None    Findings:  No acute fractures are visualized  No bony lesions are visualized.  Normal soft tissue appearance  AC joint: Severe joint space narrowing  Glenohumeral joint: Mild joint space narrowing  Acromion type: 2      Impression:    No acute bony abnormalities noted  Type II acromion  Severe AC joint arthritis              Assessment    Assessment:  1. Right shoulder pain, unspecified chronicity    2. Injury of right shoulder, initial encounter    3. Rotator cuff syndrome of right shoulder        Plan:  Continue over-the-counter medication as needed for discomfort  Right shoulder injury with rotator cuff syndrome and likely tear--given the mechanism and history, concern is for significant rotator cuff tearing.  Plan will be for an MRI of the patient's right shoulder.  This is potentially a preop study.  Patient has a fairly profound loss of strength in the right upper extremity.  Positive empty can test as well.  MRI will be requested and I will try to get this done next immediately.  We will also order some tramadol and Flexeril for him to help with nighttime difficulties.        Ramon Gillespie MD  04/17/25  10:58 EDT      Dictated Utilizing Dragon Dictation.

## 2025-04-28 ENCOUNTER — OFFICE VISIT (OUTPATIENT)
Dept: INTERNAL MEDICINE | Facility: CLINIC | Age: 60
End: 2025-04-28
Payer: COMMERCIAL

## 2025-04-28 VITALS
SYSTOLIC BLOOD PRESSURE: 128 MMHG | WEIGHT: 203 LBS | HEART RATE: 74 BPM | TEMPERATURE: 97.4 F | DIASTOLIC BLOOD PRESSURE: 76 MMHG | OXYGEN SATURATION: 97 % | BODY MASS INDEX: 26.05 KG/M2 | HEIGHT: 74 IN

## 2025-04-28 DIAGNOSIS — E78.00 PURE HYPERCHOLESTEROLEMIA: ICD-10-CM

## 2025-04-28 DIAGNOSIS — E11.49 TYPE 2 DIABETES MELLITUS WITH OTHER NEUROLOGIC COMPLICATION, WITHOUT LONG-TERM CURRENT USE OF INSULIN: Primary | ICD-10-CM

## 2025-04-28 DIAGNOSIS — I10 ESSENTIAL HYPERTENSION: ICD-10-CM

## 2025-04-28 DIAGNOSIS — Z12.5 PROSTATE CANCER SCREENING: ICD-10-CM

## 2025-04-28 LAB
EXPIRATION DATE: ABNORMAL
HBA1C MFR BLD: 8.2 % (ref 4.5–5.7)
Lab: ABNORMAL

## 2025-04-28 NOTE — PROGRESS NOTES
Subjective     Patient ID: Vaughn Huynh is a 59 y.o. male. Patient is here for management of multiple medical problems.     Chief Complaint   Patient presents with    Diabetes     History of Present Illness     Dm much better.        The following portions of the patient's history were reviewed and updated as appropriate: allergies, current medications, past family history, past medical history, past social history, past surgical history and problem list.    Review of Systems    Current Outpatient Medications:     apixaban (ELIQUIS) 5 MG tablet tablet, Take 1 tablet by mouth Every 12 (Twelve) Hours. Indications: Atrial Fibrillation, Disp: 48 tablet, Rfl: 0    aspirin 81 MG tablet, Take  by mouth daily., Disp: , Rfl:     azelastine (ASTELIN) 0.1 % nasal spray, Administer 2 sprays into the nostril(s) as directed by provider 2 (Two) Times a Day. Use in each nostril as directed, Disp: 30 mL, Rfl: 12    betamethasone dipropionate (DIPROSONE) 0.05 % cream, Apply  topically to the appropriate area as directed 2 (Two) Times a Day., Disp: 15 g, Rfl: 3    Continuous Glucose Sensor (Dexcom G7 Sensor) misc, Use 1 each Every 10 (Ten) Days., Disp: 2 each, Rfl: 0    cyclobenzaprine (FLEXERIL) 10 MG tablet, Take 1 tablet by mouth 2 (Two) Times a Day As Needed for Muscle Spasms., Disp: 40 tablet, Rfl: 1    Diclofenac Sodium (VOLTAREN) 1 % gel gel, Apply 4 g topically to the appropriate area as directed 4 (Four) Times a Day As Needed (pain)., Disp: 100 g, Rfl: 1    fenofibrate micronized (LOFIBRA) 200 MG capsule, Take 1 capsule by mouth Every Morning Before Breakfast., Disp: , Rfl:     fluticasone (FLONASE) 50 MCG/ACT nasal spray, Administer 2 sprays into the nostril(s) as directed by provider Daily., Disp: 16 g, Rfl: 0    glipizide (Glucotrol) 5 MG tablet, Take 1 tablet by mouth 2 (Two) Times a Day Before Meals., Disp: 180 tablet, Rfl: 3    glucose blood (OneTouch Ultra) test strip, USE TO TEST EVERY DAY AS DIRECTED, Disp: 300  "each, Rfl: 1    glucose monitor monitoring kit, 1 each As Needed (bs)., Disp: 1 each, Rfl: 1    levocetirizine (XYZAL) 5 MG tablet, Take 1 tablet by mouth Every Evening., Disp: 90 tablet, Rfl: 0    lisinopril-hydrochlorothiazide (PRINZIDE,ZESTORETIC) 20-12.5 MG per tablet, TAKE 1 TABLET BY MOUTH TWICE DAILY, Disp: 180 tablet, Rfl: 3    metFORMIN (GLUCOPHAGE) 500 MG tablet, Take 1 tablet by mouth 2 (Two) Times a Day With Meals., Disp: 180 tablet, Rfl: 3    metoprolol succinate XL (TOPROL-XL) 25 MG 24 hr tablet, Take 1 tablet by mouth Daily., Disp: 90 tablet, Rfl: 3    ONE TOUCH LANCETS misc, E11.9 use what is covered by insurance., Disp: 100 each, Rfl: 12    rosuvastatin (CRESTOR) 20 MG tablet, Take 1 tablet by mouth Daily., Disp: 90 tablet, Rfl: 3    traMADol (ULTRAM) 50 MG tablet, Take 1 tablet by mouth Every 6 (Six) Hours As Needed for Severe Pain., Disp: 30 tablet, Rfl: 0    Objective      Blood pressure 128/76, pulse 74, temperature 97.4 °F (36.3 °C), height 188 cm (74\"), weight 92.1 kg (203 lb), SpO2 97%.            Physical Exam     General Appearance:    Alert, cooperative, no distress, appears stated age   Head:    Normocephalic, without obvious abnormality, atraumatic   Eyes:    PERRL, conjunctiva/corneas clear, EOM's intact   Ears:    Normal TM's and external ear canals, both ears   Nose:   Nares normal, septum midline, mucosa normal, no drainage   or sinus tenderness   Throat:   Lips, mucosa, and tongue normal; teeth and gums normal   Neck:   Supple, symmetrical, trachea midline, no adenopathy;        thyroid:  No enlargement/tenderness/nodules; no carotid    bruit or JVD   Back:     Symmetric, no curvature, ROM normal, no CVA tenderness   Lungs:     Clear to auscultation bilaterally, respirations unlabored   Chest wall:    No tenderness or deformity   Heart:    Regular rate and rhythm, S1 and S2 normal, no murmur,        rub or gallop   Abdomen:     Soft, non-tender, bowel sounds active all four " quadrants,     no masses, no organomegaly   Extremities:   Extremities normal, atraumatic, no cyanosis or edema   Pulses:   2+ and symmetric all extremities   Skin:   Skin color, texture, turgor normal, no rashes or lesions   Lymph nodes:   Cervical, supraclavicular, and axillary nodes normal   Neurologic:   CNII-XII intact. Normal strength, sensation and reflexes       throughout      Results for orders placed or performed in visit on 04/28/25   POC Glycosylated Hemoglobin (Hb A1C)    Collection Time: 04/28/25  8:47 AM    Specimen: Blood   Result Value Ref Range    Hemoglobin A1C 8.2 (A) 4.5 - 5.7 %    Lot Number [id number]     Expiration Date 12/05/2026          Assessment & Plan       Diet changes gong well.   Ha1c rapidly decreasing.              Diagnoses and all orders for this visit:    1. Type 2 diabetes mellitus with other neurologic complication, without long-term current use of insulin (Primary)  -     POC Glycosylated Hemoglobin (Hb A1C)  -     CBC & Differential  -     Comprehensive Metabolic Panel  -     Lipid Panel  -     CBC & Differential  -     Vitamin B12  -     Comprehensive Metabolic Panel  -     TSH  -     Hemoglobin A1c  -     Microalbumin / Creatinine Urine Ratio - Urine, Clean Catch    2. Essential hypertension  -     Lipid Panel  -     CBC & Differential  -     Vitamin B12  -     Comprehensive Metabolic Panel  -     TSH  -     Hemoglobin A1c  -     Microalbumin / Creatinine Urine Ratio - Urine, Clean Catch    3. Pure hypercholesterolemia  -     Lipid Panel  -     CBC & Differential  -     Vitamin B12  -     Comprehensive Metabolic Panel  -     TSH  -     Hemoglobin A1c  -     Microalbumin / Creatinine Urine Ratio - Urine, Clean Catch    4. Prostate cancer screening  -     Lipid Panel  -     CBC & Differential  -     Vitamin B12  -     Comprehensive Metabolic Panel  -     TSH  -     Hemoglobin A1c  -     Microalbumin / Creatinine Urine Ratio - Urine, Clean Catch      Return in about 6 months  (around 10/28/2025).          There are no Patient Instructions on file for this visit.     Sen Nichole MD    Assessment & Plan

## 2025-05-09 ENCOUNTER — OFFICE VISIT (OUTPATIENT)
Dept: NEUROLOGY | Facility: CLINIC | Age: 60
End: 2025-05-09
Payer: COMMERCIAL

## 2025-05-09 VITALS
WEIGHT: 202 LBS | OXYGEN SATURATION: 98 % | BODY MASS INDEX: 25.93 KG/M2 | SYSTOLIC BLOOD PRESSURE: 140 MMHG | HEART RATE: 82 BPM | HEIGHT: 74 IN | TEMPERATURE: 97.3 F | DIASTOLIC BLOOD PRESSURE: 100 MMHG

## 2025-05-09 DIAGNOSIS — H93.19 TINNITUS, UNSPECIFIED LATERALITY: ICD-10-CM

## 2025-05-09 DIAGNOSIS — G47.33 OBSTRUCTIVE SLEEP APNEA: Primary | ICD-10-CM

## 2025-05-09 PROCEDURE — 99214 OFFICE O/P EST MOD 30 MIN: CPT | Performed by: NURSE PRACTITIONER

## 2025-05-09 NOTE — PROGRESS NOTES
"     Follow up Sleep Patient Office Visit      Patient Name: Vaughn Huynh  : 1965   MRN: [id number]     Referring Physician: No ref. provider found    Chief Complaint:    Chief Complaint   Patient presents with   • Follow-up     1Y F/U    • Sleep Apnea     Pt states the only thing he has trouble with is getting supplies. DME co [employer]        History of Present Illness: Vaughn Huynh is a 59 y.o. male who is here today to follow up with SALVADOR and was last seen on 3/5/2024.  He is currently on AutoPap 6-16cm, overall compliance 30%, compliance >4 hours 30%, AHI 0/hour.  He is tolerating his pressures fairly well but can't use his machine every night due to sinus congestion.  He is interested in a referral to ENT to discuss the Inspire device, \"clogged ears\" and long standing tinnitus.  He has had tinnitus since he was a teenager.  He has had a problem with stopped up ears for quite some time.   *DME company- [employer]   *Mask- Nasal   *Tubing- Regular   *Current compliance report reviewed and has been scanned into the patient's medical record.     Following taken from previous visit note:  Vaughn Huynh is a 58 y.o. male who is here today to follow up with [facility].  Currently on AutoPap 6-16cm, 95th percentile 8.5cm, AHI 0.3/hour, compliance 73%.  He is tolerating his pressures well.  He is using a nasal mask and [employer] DME.  His compliance is down because his headgear broke and he had to get a new one and states, \"I got stuck out of town without it a couple of times\".  He has had symptomatic improvement with initiation of AutoPap therapy.  He asks about the Inspire device for SALVADOR.     Subjective      Review of Systems:   Review of Systems   HENT:  Positive for tinnitus.         \"Stopped up ears\"       Medications:     Current Outpatient Medications:   •  apixaban (ELIQUIS) 5 MG tablet tablet, Take 1 tablet by mouth Every 12 (Twelve) Hours. Indications: Atrial Fibrillation, Disp: 48 tablet, Rfl: 0  •  " aspirin 81 MG tablet, Take  by mouth daily., Disp: , Rfl:   •  azelastine (ASTELIN) 0.1 % nasal spray, Administer 2 sprays into the nostril(s) as directed by provider 2 (Two) Times a Day. Use in each nostril as directed, Disp: 30 mL, Rfl: 12  •  betamethasone dipropionate (DIPROSONE) 0.05 % cream, Apply  topically to the appropriate area as directed 2 (Two) Times a Day., Disp: 15 g, Rfl: 3  •  Continuous Glucose Sensor (Dexcom G7 Sensor) misc, Use 1 each Every 10 (Ten) Days., Disp: 2 each, Rfl: 0  •  cyclobenzaprine (FLEXERIL) 10 MG tablet, Take 1 tablet by mouth 2 (Two) Times a Day As Needed for Muscle Spasms., Disp: 40 tablet, Rfl: 1  •  Diclofenac Sodium (VOLTAREN) 1 % gel gel, Apply 4 g topically to the appropriate area as directed 4 (Four) Times a Day As Needed (pain)., Disp: 100 g, Rfl: 1  •  fenofibrate micronized (LOFIBRA) 200 MG capsule, Take 1 capsule by mouth Every Morning Before Breakfast., Disp: , Rfl:   •  fluticasone (FLONASE) 50 MCG/ACT nasal spray, Administer 2 sprays into the nostril(s) as directed by provider Daily., Disp: 16 g, Rfl: 0  •  glipizide (Glucotrol) 5 MG tablet, Take 1 tablet by mouth 2 (Two) Times a Day Before Meals., Disp: 180 tablet, Rfl: 3  •  glucose blood (OneTouch Ultra) test strip, USE TO TEST EVERY DAY AS DIRECTED, Disp: 300 each, Rfl: 1  •  glucose monitor monitoring kit, 1 each As Needed (bs)., Disp: 1 each, Rfl: 1  •  levocetirizine (XYZAL) 5 MG tablet, Take 1 tablet by mouth Every Evening., Disp: 90 tablet, Rfl: 0  •  lisinopril-hydrochlorothiazide (PRINZIDE,ZESTORETIC) 20-12.5 MG per tablet, TAKE 1 TABLET BY MOUTH TWICE DAILY, Disp: 180 tablet, Rfl: 3  •  metFORMIN (GLUCOPHAGE) 500 MG tablet, Take 1 tablet by mouth 2 (Two) Times a Day With Meals., Disp: 180 tablet, Rfl: 3  •  metoprolol succinate XL (TOPROL-XL) 25 MG 24 hr tablet, Take 1 tablet by mouth Daily., Disp: 90 tablet, Rfl: 3  •  ONE TOUCH LANCETS misc, E11.9 use what is covered by insurance., Disp: 100 each,  "Rfl: 12  •  rosuvastatin (CRESTOR) 20 MG tablet, Take 1 tablet by mouth Daily., Disp: 90 tablet, Rfl: 3  •  traMADol (ULTRAM) 50 MG tablet, Take 1 tablet by mouth Every 6 (Six) Hours As Needed for Severe Pain., Disp: 30 tablet, Rfl: 0    Allergies:   Allergies   Allergen Reactions   • Statins Unknown - Low Severity     Other reaction(s): Rhabdomyolysis       Objective     Physical Exam:  Vital Signs:   Vitals:    05/09/25 0807   BP: 140/100   BP Location: Right arm   Patient Position: Sitting   Cuff Size: Adult   Pulse: 82   Temp: 97.3 °F (36.3 °C)   TempSrc: Infrared   SpO2: 98%   Weight: 91.6 kg (202 lb)   Height: 188 cm (74.02\")   PainSc: 0-No pain     BMI: Body mass index is 25.92 kg/m².    Physical Exam  Vitals and nursing note reviewed.   Constitutional:       General: He is not in acute distress.     Appearance: Normal appearance. He is well-developed and normal weight. He is not diaphoretic.   HENT:      Head: Normocephalic and atraumatic.      Right Ear: Ear canal and external ear normal. Tympanic membrane is scarred. Tympanic membrane is not erythematous or bulging.      Left Ear: Ear canal and external ear normal. Tympanic membrane is scarred. Tympanic membrane is not erythematous or bulging.   Eyes:      Extraocular Movements: Extraocular movements intact.      Conjunctiva/sclera: Conjunctivae normal.   Pulmonary:      Effort: Pulmonary effort is normal. No respiratory distress.   Musculoskeletal:         General: Normal range of motion.   Skin:     General: Skin is warm and dry.   Neurological:      Mental Status: He is alert and oriented to person, place, and time.   Psychiatric:         Mood and Affect: Mood normal.         Behavior: Behavior normal.         Thought Content: Thought content normal.         Judgment: Judgment normal.         Assessment / Plan      Assessment/Plan:   Diagnoses and all orders for this visit:    1. Obstructive sleep apnea (Primary)  -     Ambulatory Referral to ENT " (Otolaryngology)    2. Tinnitus, unspecified laterality  -     Ambulatory Referral to ENT (Otolaryngology)    *Referral to ENT to discuss Inspire device, tinnitus and ear issues.   *I have discussed the importance of adequate AutoPap compliance in regards to cardiovascular health and risk of stroke.   *Order for supplies sent to [facility].   *Printed patient education on the Inspire device provided today.   *Will fax copy of AutoPap compliance to [facility]er at [employer] [facility] (beside [employer]).     Follow Up:   Return in about 1 year (around 5/9/2026) for F/U Obstructive Sleep Apnea.    I have advised the patient the need to continue the use of CPAP.  Gold standard for treatment of sleep apnea includes weight loss, use of cpap and avoidance of alcohol.  Encouraged weight loss (if applicable) with a BMI goal of 24.  Untreated SALVADOR may increase the risk for development of hypertension, stroke, myocardial infarction, diabetes, cardiovascular disease, work-related issues and driving accidents. I have counseled and advised the patient to avoid driving or operating heavy/dangerous equipment if feeling drowsy.     MYRNA Machado, FNP-C  [facility]

## 2025-05-12 ENCOUNTER — TELEPHONE (OUTPATIENT)
Dept: INTERNAL MEDICINE | Facility: CLINIC | Age: 60
End: 2025-05-12
Payer: COMMERCIAL

## 2025-05-12 ENCOUNTER — TELEPHONE (OUTPATIENT)
Dept: INTERNAL MEDICINE | Facility: CLINIC | Age: 60
End: 2025-05-12

## 2025-05-12 NOTE — TELEPHONE ENCOUNTER
Caller: Vaughn Huynh    Relationship: Self    Best call back number: [phone]    What is the medical concern/diagnosis:     BACK/SCIATIC PAIN    What specialty or service is being requested:     ORTHOPEDICS    What is the provider, practice or medical service name:     [facility]    Any additional details:     PLEASE CALL WITH APPOINTMENT DETAILS

## 2025-05-12 NOTE — TELEPHONE ENCOUNTER
"Relay     \"  Called patient back with no answer, left a voicemail for patient informing what he was needing, patient has already seen Dr. Colon in 04/2025 so he can call their office to schedule an appointment, that there is no other appointment on file for him. Informed him to call their office to schedule.\"               "

## 2025-05-12 NOTE — TELEPHONE ENCOUNTER
Caller: Vaughn Huynh    Relationship: Self    Best call back number: [phone]    Which medication are you concerned about:     DEXCOM G7 SAMPLES    What are your concerns:     WHEN THE PATIENT WAS IN THE OFFICE ON 4/28/25 WE HAD SOME SAMPLES FOR HIM AND HE WALKED OFF AND LEFT THEM.      PLEASE CALL WHEN HE CAN COME BACK IN AND PICK THOSE UP

## 2025-05-12 NOTE — TELEPHONE ENCOUNTER
Left message on patient's voicemail to let him know his Dexcom 7 samples were left at the . He can come by and .

## 2025-06-30 ENCOUNTER — TELEPHONE (OUTPATIENT)
Dept: ORTHOPEDIC SURGERY | Facility: CLINIC | Age: 60
End: 2025-06-30

## 2025-06-30 NOTE — TELEPHONE ENCOUNTER
Caller: Vaughn Huynh    Relationship: Self    Best call back number: [phone]    What orders are you requesting (i.e. lab or imaging): REFERRAL FOR RIGHT SIDED LOW BACK PAIN THAT TRAVEL DOWN THE RIGHT LEG    In what timeframe would the patient need to come in: ASAP     Where will you receive your lab/imaging services[facility] [place]      Additional notes: PATIENT STATES IT HAS BEEN ONGOING FOR 30 DAYS-PLEASE CALL WHEN THE ORDER IS PLACED

## 2025-06-30 NOTE — TELEPHONE ENCOUNTER
[facility] does not treat back/spine issues. Routing this message to the referring PCP office so they can place the referral. Thank you.

## 2025-07-03 ENCOUNTER — OFFICE VISIT (OUTPATIENT)
Dept: INTERNAL MEDICINE | Facility: CLINIC | Age: 60
End: 2025-07-03
Payer: COMMERCIAL

## 2025-07-03 VITALS
HEART RATE: 97 BPM | OXYGEN SATURATION: 97 % | DIASTOLIC BLOOD PRESSURE: 84 MMHG | TEMPERATURE: 97.6 F | BODY MASS INDEX: 25.41 KG/M2 | WEIGHT: 198 LBS | HEIGHT: 74 IN | SYSTOLIC BLOOD PRESSURE: 132 MMHG | RESPIRATION RATE: 16 BRPM

## 2025-07-03 DIAGNOSIS — M54.41 ACUTE RIGHT-SIDED LOW BACK PAIN WITH RIGHT-SIDED SCIATICA: Primary | ICD-10-CM

## 2025-07-03 PROCEDURE — 99213 OFFICE O/P EST LOW 20 MIN: CPT | Performed by: NURSE PRACTITIONER

## 2025-07-03 RX ORDER — KETOROLAC TROMETHAMINE 30 MG/ML
60 INJECTION, SOLUTION INTRAMUSCULAR; INTRAVENOUS ONCE
Status: DISCONTINUED | OUTPATIENT
Start: 2025-07-03 | End: 2025-07-03

## 2025-07-03 RX ORDER — ACYCLOVIR 400 MG/1
1 TABLET ORAL
Qty: 2 EACH | Refills: 0 | COMMUNITY
Start: 2025-07-03

## 2025-07-03 NOTE — PATIENT INSTRUCTIONS
Low Back Strain With Rehab  A strain is an injury in which a tendon or muscle is torn. The muscles and tendons of the lower back are vulnerable to strains. However, these muscles and tendons are very strong and require a great force to be injured. Strains are classified into three categories. Grade 1 strains cause pain, but the tendon is not lengthened. Grade 2 strains include a lengthened ligament, due to the ligament being stretched or partially ruptured. With grade 2 strains there is still function, although the function may be decreased. Grade 3 strains involve a complete tear of the tendon or muscle, and function is usually impaired.  SYMPTOMS   Pain in the lower back.  Pain that affects one side more than the other.  Pain that gets worse with movement and may be felt in the hip, buttocks, or back of the thigh.  Muscle spasms of the muscles in the back.  Swelling along the muscles of the back.  Loss of strength of the back muscles.  Crackling sound (crepitation) when the muscles are touched.  CAUSES   Lower back strains occur when a force is placed on the muscles or tendons that is greater than they can handle. Common causes of injury include:  Prolonged overuse of the muscle-tendon units in the lower back, usually from incorrect posture.  A single violent injury or force applied to the back.  RISK INCREASES WITH:  Sports that involve twisting forces on the spine or a lot of bending at the waist (football, rugby, weightlifting, bowling, golf, tennis, speed skating, racquetball, swimming, running, gymnastics, diving).  Poor strength and flexibility.  Failure to warm up properly before activity.  Family history of lower back pain or disk disorders.  Previous back injury or surgery (especially fusion).  Poor posture with lifting, especially heavy objects.  Prolonged sitting, especially with poor posture.  PREVENTION   Learn and use proper posture when sitting or lifting (maintain proper posture when sitting, lift  using the knees and legs, not at the waist).  Warm up and stretch properly before activity.  Allow for adequate recovery between workouts.  Maintain physical fitness:  Strength, flexibility, and endurance.  Cardiovascular fitness.  PROGNOSIS   If treated properly, lower back strains usually heal within 6 weeks.  RELATED COMPLICATIONS   Recurring symptoms, resulting in a chronic problem.  Chronic inflammation, scarring, and partial muscle-tendon tear.  Delayed healing or resolution of symptoms.  Prolonged disability.  TREATMENT   Treatment first involves the use of ice and medicine, to reduce pain and inflammation. The use of strengthening and stretching exercises may help reduce pain with activity. These exercises may be performed at home or with a therapist. Severe injuries may require referral to a therapist for further evaluation and treatment, such as ultrasound. Your caregiver may advise that you wear a back brace or corset, to help reduce pain and discomfort. Often, prolonged bed rest results in greater harm then benefit. Corticosteroid injections may be recommended. However, these should be reserved for the most serious cases. It is important to avoid using your back when lifting objects. At night, sleep on your back on a firm mattress with a pillow placed under your knees. If non-surgical treatment is unsuccessful, surgery may be needed.   MEDICATION   If pain medicine is needed, nonsteroidal anti-inflammatory medicines (aspirin and ibuprofen), or other minor pain relievers (acetaminophen), are often advised.  Do not take pain medicine for 7 days before surgery.  Prescription pain relievers may be given, if your caregiver thinks they are needed. Use only as directed and only as much as you need.  Ointments applied to the skin may be helpful.  Corticosteroid injections may be given by your caregiver. These injections should be reserved for the most serious cases, because they may only be given a certain  number of times.  HEAT AND COLD  Cold treatment (icing) should be applied for 10 to 15 minutes every 2 to 3 hours for inflammation and pain, and immediately after activity that aggravates your symptoms. Use ice packs or an ice massage.  Heat treatment may be used before performing stretching and strengthening activities prescribed by your caregiver, physical therapist, or . Use a heat pack or a warm water soak.  SEEK MEDICAL CARE IF:   Symptoms get worse or do not improve in 2 to 4 weeks, despite treatment.  You develop numbness, weakness, or loss of bowel or bladder function.  New, unexplained symptoms develop. (Drugs used in treatment may produce side effects.)      EXERCISES   RANGE OF MOTION (ROM) AND STRETCHING EXERCISES - Low Back Strain  Most people with lower back pain will find that their symptoms get worse with excessive bending forward (flexion) or arching at the lower back (extension). The exercises which will help resolve your symptoms will focus on the opposite motion.   Your physician, physical therapist or  will help you determine which exercises will be most helpful to resolve your lower back pain. Do not complete any exercises without first consulting with your caregiver. Discontinue any exercises which make your symptoms worse until you speak to your caregiver.   If you have pain, numbness or tingling which travels down into your buttocks, leg or foot, the goal of the therapy is for these symptoms to move closer to your back and eventually resolve. Sometimes, these leg symptoms will get better, but your lower back pain may worsen. This is typically an indication of progress in your rehabilitation. Be very alert to any changes in your symptoms and the activities in which you participated in the 24 hours prior to the change. Sharing this information with your caregiver will allow him/her to most efficiently treat your condition.  These exercises may help you when  beginning to rehabilitate your injury. Your symptoms may resolve with or without further involvement from your physician, physical therapist or . While completing these exercises, remember:  Restoring tissue flexibility helps normal motion to return to the joints. This allows healthier, less painful movement and activity.  An effective stretch should be held for at least 30 seconds.  A stretch should never be painful. You should only feel a gentle lengthening or release in the stretched tissue.  FLEXION RANGE OF MOTION AND STRETCHING EXERCISES:  STRETCH - Flexion, Single Knee to Chest   Lie on a firm bed or floor with both legs extended in front of you.  Keeping one leg in contact with the floor, bring your opposite knee to your chest. Hold your leg in place by either grabbing behind your thigh or at your knee.  Pull until you feel a gentle stretch in your lower back. Hold 5-10 seconds.  Slowly release your grasp and repeat the exercise with the opposite side.  Repeat 3-5 times. Complete this exercise 2 times per day.   STRETCH - Flexion, Double Knee to Chest   Lie on a firm bed or floor with both legs extended in front of you.  Keeping one leg in contact with the floor, bring your opposite knee to your chest.  Tense your stomach muscles to support your back and then lift your other knee to your chest. Hold your legs in place by either grabbing behind your thighs or at your knees.  Pull both knees toward your chest until you feel a gentle stretch in your lower back. Hold 5-10 seconds.  Tense your stomach muscles and slowly return one leg at a time to the floor.  Repeat 3-5 times. Complete this exercise 2 times per day.   STRETCH - Low Trunk Rotation  Lie on a firm bed or floor. Keeping your legs in front of you, bend your knees so they are both pointed toward the ceiling and your feet are flat on the floor.  Extend your arms out to the side. This will stabilize your upper body by keeping your  shoulders in contact with the floor.  Gently and slowly drop both knees together to one side until you feel a gentle stretch in your lower back. Hold for 5-10 seconds.  Tense your stomach muscles to support your lower back as you bring your knees back to the starting position. Repeat the exercise to the other side.  Repeat 3-5 times. Complete this exercise 2 times per day   EXTENSION RANGE OF MOTION AND FLEXIBILITY EXERCISES:  STRETCH - Extension, Prone on Elbows   Lie on your stomach on the floor, a bed will be too soft. Place your palms about shoulder width apart and at the height of your head.  Place your elbows under your shoulders. If this is too painful, stack pillows under your chest.  Allow your body to relax so that your hips drop lower and make contact more completely with the floor.  Hold this position for 5-10 seconds.  Slowly return to lying flat on the floor.  Repeat 3-5 times. Complete this exercise 2 times per day.   RANGE OF MOTION - Extension, Prone Press Ups  Lie on your stomach on the floor, a bed will be too soft. Place your palms about shoulder width apart and at the height of your head.  Keeping your back as relaxed as possible, slowly straighten your elbows while keeping your hips on the floor. You may adjust the placement of your hands to maximize your comfort. As you gain motion, your hands will come more underneath your shoulders.  Hold this position 5-10 seconds.  Slowly return to lying flat on the floor.  Repeat 3-5 times. Complete this exercise 2 times per day.   RANGE OF MOTION- Quadruped, Neutral Spine   Assume a hands and knees position on a firm surface. Keep your hands under your shoulders and your knees under your hips. You may place padding under your knees for comfort.  Drop your head and point your tail bone toward the ground below you. This will round out your lower back like an angry cat. Hold this position for 5-10 seconds.  Slowly lift your head and release your tail bone  "so that your back sags into a large arch, like an old horse.  Hold this position for 5-10 seconds.  Repeat this until you feel limber in your lower back.  Now, find your \"sweet spot.\" This will be the most comfortable position somewhere between the two previous positions. This is your neutral spine. Once you have found this position, tense your stomach muscles to support your lower back.  Hold this position for 5-10 seconds.  Repeat 3-5 times. Complete this exercise 2 times per day.       STRENGTHENING EXERCISES - Low Back Strain  These exercises may help you when beginning to rehabilitate your injury. These exercises should be done near your \"sweet spot.\" This is the neutral, low-back arch, somewhere between fully rounded and fully arched, that is your least painful position. When performed in this safe range of motion, these exercises can be used for people who have either a flexion or extension based injury. These exercises may resolve your symptoms with or without further involvement from your physician, physical therapist or . While completing these exercises, remember:   Muscles can gain both the endurance and the strength needed for everyday activities through controlled exercises.  Complete these exercises as instructed by your physician, physical therapist or . Increase the resistance and repetitions only as guided.  You may experience muscle soreness or fatigue, but the pain or discomfort you are trying to eliminate should never worsen during these exercises. If this pain does worsen, stop and make certain you are following the directions exactly. If the pain is still present after adjustments, discontinue the exercise until you can discuss the trouble with your caregiver.  STRENGTHENING - Deep Abdominals, Pelvic Tilt  Lie on a firm bed or floor. Keeping your legs in front of you, bend your knees so they are both pointed toward the ceiling and your feet are flat on the " floor.  Tense your lower abdominal muscles to press your lower back into the floor. This motion will rotate your pelvis so that your tail bone is scooping upwards rather than pointing at your feet or into the floor.  With a gentle tension and even breathing, hold this position for 5 seconds.  Repeat 5 times. Complete this exercise 1 times per day.   STRENGTHENING - Abdominals, Crunches   Lie on a firm bed or floor. Keeping your legs in front of you, bend your knees so they are both pointed toward the ceiling and your feet are flat on the floor. Cross your arms over your chest.  Slightly tip your chin down without bending your neck.  Tense your abdominals and slowly lift your trunk high enough to just clear your shoulder blades. Lifting higher can put excessive stress on the lower back and does not further strengthen your abdominal muscles.  Control your return to the starting position.  Repeat 5-10 times. Complete this exercise 1 times per day.   STRENGTHENING - Quadruped, Opposite UE/LE Lift   Assume a hands and knees position on a firm surface. Keep your hands under your shoulders and your knees under your hips. You may place padding under your knees for comfort.  Find your neutral spine and gently tense your abdominal muscles so that you can maintain this position. Your shoulders and hips should form a rectangle that is parallel with the floor and is not twisted.  Keeping your trunk steady, lift your right hand no higher than your shoulder and then your left leg no higher than your hip. Make sure you are not holding your breath. Hold this position 5-10 seconds.  Continuing to keep your abdominal muscles tense and your back steady, slowly return to your starting position. Repeat with the opposite arm and leg.  Repeat 3-5 times. Complete this exercise 2 times per day.   STRENGTHENING - Lower Abdominals, Double Knee Lift  Lie on a firm bed or floor. Keeping your legs in front of you, bend your knees so they are both  pointed toward the ceiling and your feet are flat on the floor.  Tense your abdominal muscles to brace your lower back and slowly lift both of your knees until they come over your hips. Be certain not to hold your breath.  Hold 5-10 seconds. Using your abdominal muscles, return to the starting position in a slow and controlled manner.  Repeat 3-5 times. Complete this exercise 2 times per day.   POSTURE AND BODY MECHANICS CONSIDERATIONS - Low Back Strain  Keeping correct posture when sitting, standing or completing your activities will reduce the stress put on different body tissues, allowing injured tissues a chance to heal and limiting painful experiences. The following are general guidelines for improved posture. Your physician or physical therapist will provide you with any instructions specific to your needs. While reading these guidelines, remember:  The exercises prescribed by your provider will help you have the flexibility and strength to maintain correct postures.  The correct posture provides the best environment for your joints to work. All of your joints have less wear and tear when properly supported by a spine with good posture. This means you will experience a healthier, less painful body.  Correct posture must be practiced with all of your activities, especially prolonged sitting and standing. Correct posture is as important when doing repetitive low-stress activities (typing) as it is when doing a single heavy-load activity (lifting).  RESTING POSITIONS  Consider which positions are most painful for you when choosing a resting position. If you have pain with flexion-based activities (sitting, bending, stooping, squatting), choose a position that allows you to rest in a less flexed posture. You would want to avoid curling into a fetal position on your side. If your pain worsens with extension-based activities (prolonged standing, working overhead), avoid resting in an extended position such as  sleeping on your stomach. Most people will find more comfort when they rest with their spine in a more neutral position, neither too rounded nor too arched. Lying on a non-sagging bed on your side with a pillow between your knees, or on your back with a pillow under your knees will often provide some relief. Keep in mind, being in any one position for a prolonged period of time, no matter how correct your posture, can still lead to stiffness.  PROPER SITTING POSTURE  In order to minimize stress and discomfort on your spine, you must sit with correct posture. Sitting with good posture should be effortless for a healthy body. Returning to good posture is a gradual process. Many people can work toward this most comfortably by using various supports until they have the flexibility and strength to maintain this posture on their own.  When sitting with proper posture, your ears will fall over your shoulders and your shoulders will fall over your hips. You should use the back of the chair to support your upper back. Your lower back will be in a neutral position, just slightly arched. You may place a small pillow or folded towel at the base of your lower back for support.   When working at a desk, create an environment that supports good, upright posture. Without extra support, muscles tire, which leads to excessive strain on joints and other tissues. Keep these recommendations in mind:  CHAIR:  A chair should be able to slide under your desk when your back makes contact with the back of the chair. This allows you to work closely.  The chair's height should allow your eyes to be level with the upper part of your monitor and your hands to be slightly lower than your elbows.  BODY POSITION  Your feet should make contact with the floor. If this is not possible, use a foot rest.  Keep your ears over your shoulders. This will reduce stress on your neck and lower back.  INCORRECT SITTING POSTURES   If you are feeling tired and  unable to assume a healthy sitting posture, do not slouch or slump. This puts excessive strain on your back tissues, causing more damage and pain. Healthier options include:  Using more support, like a lumbar pillow.  Switching tasks to something that requires you to be upright or walking.  Talking a brief walk.  Lying down to rest in a neutral-spine position.  PROLONGED STANDING WHILE SLIGHTLY LEANING FORWARD   When completing a task that requires you to lean forward while standing in one place for a long time, place either foot up on a stationary 2-4 inch high object to help maintain the best posture. When both feet are on the ground, the lower back tends to lose its slight inward curve. If this curve flattens (or becomes too large), then the back and your other joints will experience too much stress, tire more quickly, and can cause pain.  CORRECT STANDING POSTURES  Proper standing posture should be assumed with all daily activities, even if they only take a few moments, like when brushing your teeth. As in sitting, your ears should fall over your shoulders and your shoulders should fall over your hips. You should keep a slight tension in your abdominal muscles to brace your spine. Your tailbone should point down to the ground, not behind your body, resulting in an over-extended swayback posture.   INCORRECT STANDING POSTURES   Common incorrect standing postures include a forward head, locked knees and/or an excessive swayback.  WALKING  Walk with an upright posture. Your ears, shoulders and hips should all line-up.  PROLONGED ACTIVITY IN A FLEXED POSITION  When completing a task that requires you to bend forward at your waist or lean over a low surface, try to find a way to stabilize 3 out of 4 of your limbs. You can place a hand or elbow on your thigh or rest a knee on the surface you are reaching across. This will provide you more stability so that your muscles do not fatigue as quickly. By keeping your knees  relaxed, or slightly bent, you will also reduce stress across your lower back.  CORRECT LIFTING TECHNIQUES  DO :   Assume a wide stance. This will provide you more stability and the opportunity to get as close as possible to the object which you are lifting.  Tense your abdominals to brace your spine. Bend at the knees and hips. Keeping your back locked in a neutral-spine position, lift using your leg muscles. Lift with your legs, keeping your back straight.  Test the weight of unknown objects before attempting to lift them.  Try to keep your elbows locked down at your sides in order get the best strength from your shoulders when carrying an object.  Always ask for help when lifting heavy or awkward objects.  INCORRECT LIFTING TECHNIQUES  DO NOT:   Lock your knees when lifting, even if it is a small object.  Bend and twist. Pivot at your feet or move your feet when needing to change directions.  Assume that you can safely  even a paper clip without proper posture.     This information is not intended to replace advice given to you by your health care provider. Make sure you discuss any questions you have with your health care provider.     Document Released: 12/18/2006 Document Revised: 01/08/2016 Document Reviewed: 04/01/2010  [employer]® Patient Information ©2016 [employer].

## 2025-07-03 NOTE — PROGRESS NOTES
"  Office Visit      Patient Name: Vaughn Huynh  : 1965   MRN: [id number]   Care Team: Patient Care Team:  Sen Nichole MD as PCP - General (Internal Medicine)    Chief Complaint  Sciatica (Right leg pain, for the last month. Has gotten worse the last few days.)    Subjective     Subjective      Vaughn Huynh presents to [facility] for back pain.   Symptoms present for about 6 months and waxes and wanes.   Worsened in the last week.   Complains of right lower back pain, radiating down the right leg,   Denies paresthesia, weakness, fall/trauma/injury to the low back, saddle anesthesia, and bowel bladder incontinence.  He has tried ibuprofen, tramadol, Lortab and gabapentin and has helped some.   He did have L5-S1 surgery in . No recent physical therapy.       Objective     Objective   Vital Signs:   /84   Pulse 97   Temp 97.6 °F (36.4 °C)   Resp 16   Ht 188 cm (74.02\")   Wt 89.8 kg (198 lb)   SpO2 97%   BMI 25.41 kg/m²     Physical Exam  Vitals and nursing note reviewed.   Constitutional:       General: He is not in acute distress.     Appearance: Normal appearance.   Cardiovascular:      Rate and Rhythm: Normal rate and regular rhythm.      Heart sounds: Normal heart sounds. No murmur heard.  Pulmonary:      Effort: Pulmonary effort is normal.      Breath sounds: Normal breath sounds. No wheezing.   Abdominal:      General: Bowel sounds are normal.      Palpations: Abdomen is soft.      Tenderness: There is no right CVA tenderness or left CVA tenderness.   Musculoskeletal:         General: Deformity present. Normal range of motion.      Cervical back: Normal and normal range of motion. No muscular tenderness.      Thoracic back: Normal.      Lumbar back: Tenderness (Mild lower lumbar) present. Negative right straight leg raise test and negative left straight leg raise test.      Comments:      Skin:     General: Skin is warm and dry.      Findings: No " erythema or rash.   Neurological:      Mental Status: He is alert.      Motor: No weakness.      Deep Tendon Reflexes: Reflexes normal.   Psychiatric:         Mood and Affect: Mood normal.         Behavior: Behavior normal.          Assessment / Plan      Assessment & Plan   Problem List Items Addressed This Visit    None  Visit Diagnoses         Acute right-sided low back pain with right-sided sciatica    -  Primary    Relevant Orders    Ambulatory Referral to Physical Therapy for Evaluation & Treatment    No red flags on history, Home medications are maximized at this time I did encourage him to use his cyclobenzaprine he has as needed.  Do not take tramadol and Lortab together.  Recommend low back stretching, formal physical therapy, heat and ice as needed.  Considered ketorolac however he has been taking ibuprofen and is also on apixaban and aspirin.  Will defer            Follow Up   Return if symptoms worsen or fail to improve.  Patient was given instructions and counseling regarding his condition or for health maintenance advice. Please see specific information pulled into the AVS if appropriate.     MYRNA Rodríguez  [facility]

## 2025-07-13 DIAGNOSIS — I10 ESSENTIAL HYPERTENSION: ICD-10-CM

## 2025-07-14 RX ORDER — LISINOPRIL AND HYDROCHLOROTHIAZIDE 12.5; 2 MG/1; MG/1
1 TABLET ORAL 2 TIMES DAILY
Qty: 180 TABLET | Refills: 3 | Status: SHIPPED | OUTPATIENT
Start: 2025-07-14

## 2025-07-15 ENCOUNTER — TELEPHONE (OUTPATIENT)
Dept: INTERNAL MEDICINE | Facility: CLINIC | Age: 60
End: 2025-07-15
Payer: COMMERCIAL

## 2025-07-15 NOTE — TELEPHONE ENCOUNTER
Caller: Vaughn Huynh    Relationship: Self    Best call back number: [phone]     What is the best time to reach you: ANYTIME, OK TO LEAVE VOICEMAIL.    Who are you requesting to speak with (clinical staff, provider,  specific staff member): RENE    Do you know the name of the person who called: PATIENT    What was the call regarding: PATIENT WOULD LIKE A CALL BACK TO DISCUSS HIS BACK PAIN AN DA REFERRAL, PATIENT ADVISES HE ALSO HAS A QUESTION ABOUT HIS MEDICATION. PATIENT WOULD LIKE RENE TO CALL HIM IF SHE IS IN TODAY.    Is it okay if the provider responds through MyChart: NO CALL ONLY

## 2025-07-16 ENCOUNTER — OFFICE VISIT (OUTPATIENT)
Dept: INTERNAL MEDICINE | Facility: CLINIC | Age: 60
End: 2025-07-16
Payer: COMMERCIAL

## 2025-07-16 ENCOUNTER — HOSPITAL ENCOUNTER (OUTPATIENT)
Dept: GENERAL RADIOLOGY | Facility: HOSPITAL | Age: 60
Discharge: HOME | End: 2025-07-16
Admitting: INTERNAL MEDICINE
Payer: COMMERCIAL

## 2025-07-16 VITALS
WEIGHT: 198 LBS | OXYGEN SATURATION: 95 % | RESPIRATION RATE: 16 BRPM | DIASTOLIC BLOOD PRESSURE: 88 MMHG | TEMPERATURE: 97.8 F | HEART RATE: 101 BPM | BODY MASS INDEX: 25.41 KG/M2 | SYSTOLIC BLOOD PRESSURE: 128 MMHG | HEIGHT: 74 IN

## 2025-07-16 DIAGNOSIS — M54.16 LUMBAR BACK PAIN WITH RADICULOPATHY AFFECTING RIGHT LOWER EXTREMITY: Primary | ICD-10-CM

## 2025-07-16 DIAGNOSIS — M54.16 LUMBAR BACK PAIN WITH RADICULOPATHY AFFECTING RIGHT LOWER EXTREMITY: ICD-10-CM

## 2025-07-16 DIAGNOSIS — E11.9 TYPE 2 DIABETES MELLITUS WITHOUT COMPLICATION, WITHOUT LONG-TERM CURRENT USE OF INSULIN: ICD-10-CM

## 2025-07-16 PROCEDURE — 72100 X-RAY EXAM L-S SPINE 2/3 VWS: CPT

## 2025-07-16 PROCEDURE — 99213 OFFICE O/P EST LOW 20 MIN: CPT | Performed by: INTERNAL MEDICINE

## 2025-07-16 NOTE — PROGRESS NOTES
Subjective     Patient ID: Vaughn Huynh is a 59 y.o. male. Patient is here for management of multiple medical problems.     Chief Complaint   Patient presents with    Back Pain    Sciatica     Right leg     History of Present Illness     Back pain. Sciatic right leg x 3-4 months. Getting worse.          The following portions of the patient's history were reviewed and updated as appropriate: allergies, current medications, past family history, past medical history, past social history, past surgical history and problem list.    Review of Systems    Current Outpatient Medications:     apixaban (ELIQUIS) 5 MG tablet tablet, Take 1 tablet by mouth Every 12 (Twelve) Hours. Indications: Atrial Fibrillation, Disp: 48 tablet, Rfl: 0    aspirin 81 MG tablet, Take  by mouth daily., Disp: , Rfl:     azelastine (ASTELIN) 0.1 % nasal spray, Administer 2 sprays into the nostril(s) as directed by provider 2 (Two) Times a Day. Use in each nostril as directed, Disp: 30 mL, Rfl: 12    betamethasone dipropionate (DIPROSONE) 0.05 % cream, Apply  topically to the appropriate area as directed 2 (Two) Times a Day., Disp: 15 g, Rfl: 3    Continuous Glucose Sensor (Dexcom G7 Sensor) misc, Use 1 each Every 10 (Ten) Days., Disp: 2 each, Rfl: 0    Continuous Glucose Sensor (Dexcom G7 Sensor) misc, Use 1 each Every 10 (Ten) Days., Disp: 2 each, Rfl: 0    cyclobenzaprine (FLEXERIL) 10 MG tablet, Take 1 tablet by mouth 2 (Two) Times a Day As Needed for Muscle Spasms., Disp: 40 tablet, Rfl: 1    Diclofenac Sodium (VOLTAREN) 1 % gel gel, Apply 4 g topically to the appropriate area as directed 4 (Four) Times a Day As Needed (pain)., Disp: 100 g, Rfl: 1    fenofibrate micronized (LOFIBRA) 200 MG capsule, Take 1 capsule by mouth Every Morning Before Breakfast., Disp: , Rfl:     fluticasone (FLONASE) 50 MCG/ACT nasal spray, Administer 2 sprays into the nostril(s) as directed by provider Daily., Disp: 16 g, Rfl: 0    glipizide (Glucotrol) 5 MG  "tablet, Take 1 tablet by mouth 2 (Two) Times a Day Before Meals., Disp: 180 tablet, Rfl: 3    glucose blood (OneTouch Ultra) test strip, USE TO TEST EVERY DAY AS DIRECTED, Disp: 300 each, Rfl: 1    glucose monitor monitoring kit, 1 each As Needed (bs)., Disp: 1 each, Rfl: 1    levocetirizine (XYZAL) 5 MG tablet, Take 1 tablet by mouth Every Evening., Disp: 90 tablet, Rfl: 0    lisinopril-hydrochlorothiazide (PRINZIDE,ZESTORETIC) 20-12.5 MG per tablet, TAKE 1 TABLET BY MOUTH TWICE DAILY, Disp: 180 tablet, Rfl: 3    metFORMIN (GLUCOPHAGE) 500 MG tablet, Take 1 tablet by mouth 2 (Two) Times a Day With Meals., Disp: 180 tablet, Rfl: 3    metoprolol succinate XL (TOPROL-XL) 25 MG 24 hr tablet, Take 1 tablet by mouth Daily., Disp: 90 tablet, Rfl: 3    ONE TOUCH LANCETS misc, E11.9 use what is covered by insurance., Disp: 100 each, Rfl: 12    rosuvastatin (CRESTOR) 20 MG tablet, Take 1 tablet by mouth Daily., Disp: 90 tablet, Rfl: 3    traMADol (ULTRAM) 50 MG tablet, Take 1 tablet by mouth Every 6 (Six) Hours As Needed for Severe Pain. (Patient not taking: Reported on 7/16/2025), Disp: 30 tablet, Rfl: 0    Objective      Blood pressure 128/88, pulse 101, temperature 97.8 °F (36.6 °C), resp. rate 16, height 188 cm (74.02\"), weight 89.8 kg (198 lb), SpO2 95%.            Physical Exam     General Appearance:    Alert, cooperative, no distress, appears stated age   Head:    Normocephalic, without obvious abnormality, atraumatic   Eyes:    PERRL, conjunctiva/corneas clear, EOM's intact   Ears:    Normal TM's and external ear canals, both ears   Nose:   Nares normal, septum midline, mucosa normal, no drainage   or sinus tenderness   Throat:   Lips, mucosa, and tongue normal; teeth and gums normal   Neck:   Supple, symmetrical, trachea midline, no adenopathy;        thyroid:  No enlargement/tenderness/nodules; no carotid    bruit or JVD   Back:     Symmetric, no curvature, ROM normal, no CVA tenderness   Lungs:     Clear to " auscultation bilaterally, respirations unlabored   Chest wall:    No tenderness or deformity   Heart:    Regular rate and rhythm, S1 and S2 normal, no murmur,        rub or gallop   Abdomen:     Soft, non-tender, bowel sounds active all four quadrants,     no masses, no organomegaly   Extremities:   Extremities normal, atraumatic, no cyanosis or edema   Pulses:   2+ and symmetric all extremities   Skin:   Skin color, texture, turgor normal, no rashes or lesions   Lymph nodes:   Cervical, supraclavicular, and axillary nodes normal   Neurologic:   CNII-XII intact. Normal strength, sensation and reflexes       throughout      Results for orders placed or performed in visit on 04/28/25   POC Glycosylated Hemoglobin (Hb A1C)    Collection Time: 04/28/25  8:47 AM    Specimen: Blood   Result Value Ref Range    Hemoglobin A1C 8.2 (A) 4.5 - 5.7 %    Lot Number [id number]     Expiration Date 12/05/2026          Assessment & Plan   Back pain. Sciatic right leg x 3-4 months. Getting worse.  Start tens 7000.    Stretching. Has order in for PT          Diagnoses and all orders for this visit:    1. Lumbar back pain with radiculopathy affecting right lower extremity (Primary)  -     XR Spine Lumbar 2 or 3 View; Future    2. Type 2 diabetes mellitus without complication, without long-term current use of insulin  -     Microalbumin / Creatinine Urine Ratio - Urine, Clean Catch      No follow-ups on file.          There are no Patient Instructions on file for this visit.     Sen Nichole MD    Assessment & Plan

## 2025-07-29 ENCOUNTER — TELEPHONE (OUTPATIENT)
Dept: INTERNAL MEDICINE | Facility: CLINIC | Age: 60
End: 2025-07-29
Payer: COMMERCIAL

## 2025-07-29 DIAGNOSIS — E78.00 PURE HYPERCHOLESTEROLEMIA: ICD-10-CM

## 2025-07-29 DIAGNOSIS — E11.9 TYPE 2 DIABETES MELLITUS WITHOUT COMPLICATION, WITHOUT LONG-TERM CURRENT USE OF INSULIN: ICD-10-CM

## 2025-07-29 RX ORDER — ACYCLOVIR 400 MG/1
2 TABLET ORAL
Qty: 2 EACH | Refills: 0 | COMMUNITY
Start: 2025-07-29

## 2025-07-29 RX ORDER — ACYCLOVIR 400 MG/1
TABLET ORAL
Qty: 9 EACH | Refills: 1 | OUTPATIENT
Start: 2025-07-29

## 2025-07-29 NOTE — TELEPHONE ENCOUNTER
Caller:   Vaughn Huynh     Relationship:SELF    Callback number: [phone]   Is it ok to leave a message: [x] Yes [] No    Requested medication for samples:  Continuous Glucose Sensor (Dexcom G7 Sensor) misc     How much medication does the patient currently have left: TOTALLY OUT    Who will be picking up the samples: PATIENT    Do you need information about patient financial assistance for this medication: [] Yes [x] No    Additional details provided: PLEASE LET HIM KNOW IF YOU HAVE ANY AND HE WILL COME GET THEM. ALSO, HE SENT A REQUEST THRU MY CHART.

## 2025-07-31 DIAGNOSIS — I10 HYPERTENSION, UNSPECIFIED TYPE: ICD-10-CM

## 2025-08-01 RX ORDER — METOPROLOL SUCCINATE 25 MG/1
25 TABLET, EXTENDED RELEASE ORAL DAILY
Qty: 90 TABLET | Refills: 3 | Status: SHIPPED | OUTPATIENT
Start: 2025-08-01

## 2025-08-03 DIAGNOSIS — I10 HYPERTENSION, UNSPECIFIED TYPE: ICD-10-CM

## 2025-08-04 RX ORDER — ROSUVASTATIN CALCIUM 20 MG/1
20 TABLET, COATED ORAL DAILY
Qty: 90 TABLET | Refills: 3 | Status: SHIPPED | OUTPATIENT
Start: 2025-08-04

## 2025-08-12 ENCOUNTER — TREATMENT (OUTPATIENT)
Dept: PHYSICAL THERAPY | Facility: CLINIC | Age: 60
End: 2025-08-12
Payer: COMMERCIAL

## 2025-08-12 DIAGNOSIS — M54.41 CHRONIC MIDLINE LOW BACK PAIN WITH RIGHT-SIDED SCIATICA: Primary | ICD-10-CM

## 2025-08-12 DIAGNOSIS — G89.29 CHRONIC MIDLINE LOW BACK PAIN WITH RIGHT-SIDED SCIATICA: Primary | ICD-10-CM

## 2025-08-12 PROCEDURE — 97161 PT EVAL LOW COMPLEX 20 MIN: CPT

## 2025-08-12 PROCEDURE — 97110 THERAPEUTIC EXERCISES: CPT

## 2025-08-20 ENCOUNTER — TREATMENT (OUTPATIENT)
Dept: PHYSICAL THERAPY | Facility: CLINIC | Age: 60
End: 2025-08-20
Payer: COMMERCIAL

## 2025-08-20 DIAGNOSIS — M54.41 CHRONIC MIDLINE LOW BACK PAIN WITH RIGHT-SIDED SCIATICA: Primary | ICD-10-CM

## 2025-08-20 DIAGNOSIS — G89.29 CHRONIC MIDLINE LOW BACK PAIN WITH RIGHT-SIDED SCIATICA: Primary | ICD-10-CM

## 2025-08-20 DIAGNOSIS — E11.9 TYPE 2 DIABETES MELLITUS WITHOUT COMPLICATION, WITHOUT LONG-TERM CURRENT USE OF INSULIN: ICD-10-CM

## 2025-08-20 DIAGNOSIS — E78.00 PURE HYPERCHOLESTEROLEMIA: ICD-10-CM

## 2025-08-20 PROCEDURE — 97140 MANUAL THERAPY 1/> REGIONS: CPT

## 2025-08-20 PROCEDURE — 97110 THERAPEUTIC EXERCISES: CPT

## 2025-08-21 DIAGNOSIS — E11.9 TYPE 2 DIABETES MELLITUS WITHOUT COMPLICATION, WITHOUT LONG-TERM CURRENT USE OF INSULIN: ICD-10-CM

## 2025-08-21 DIAGNOSIS — E78.00 PURE HYPERCHOLESTEROLEMIA: ICD-10-CM

## 2025-08-21 RX ORDER — ACYCLOVIR 400 MG/1
1 TABLET ORAL
Qty: 3 EACH | Refills: 11 | Status: SHIPPED | OUTPATIENT
Start: 2025-08-21

## 2025-08-21 RX ORDER — ACYCLOVIR 400 MG/1
TABLET ORAL
Qty: 9 EACH | Refills: 1 | Status: SHIPPED | OUTPATIENT
Start: 2025-08-21 | End: 2025-08-21 | Stop reason: SDUPTHER

## 2025-08-21 RX ORDER — ACYCLOVIR 400 MG/1
TABLET ORAL
Qty: 9 EACH | Refills: 1 | OUTPATIENT
Start: 2025-08-21

## 2025-08-26 ENCOUNTER — TREATMENT (OUTPATIENT)
Dept: PHYSICAL THERAPY | Facility: CLINIC | Age: 60
End: 2025-08-26
Payer: COMMERCIAL

## 2025-08-26 DIAGNOSIS — G89.29 CHRONIC MIDLINE LOW BACK PAIN WITH RIGHT-SIDED SCIATICA: Primary | ICD-10-CM

## 2025-08-26 DIAGNOSIS — M54.41 CHRONIC MIDLINE LOW BACK PAIN WITH RIGHT-SIDED SCIATICA: Primary | ICD-10-CM

## 2025-08-26 PROCEDURE — 97110 THERAPEUTIC EXERCISES: CPT

## 2025-08-26 PROCEDURE — 97140 MANUAL THERAPY 1/> REGIONS: CPT
